# Patient Record
Sex: FEMALE | Race: WHITE | NOT HISPANIC OR LATINO | Employment: UNEMPLOYED | RURAL
[De-identification: names, ages, dates, MRNs, and addresses within clinical notes are randomized per-mention and may not be internally consistent; named-entity substitution may affect disease eponyms.]

---

## 2018-07-26 ENCOUNTER — HISTORICAL (OUTPATIENT)
Dept: ADMINISTRATIVE | Facility: HOSPITAL | Age: 38
End: 2018-07-26

## 2018-07-27 LAB
LAB AP CLINICAL INFORMATION: NORMAL
LAB AP DIAGNOSIS - HISTORICAL: NORMAL
LAB AP GROSS PATHOLOGY - HISTORICAL: NORMAL
LAB AP SPECIMEN SUBMITTED - HISTORICAL: NORMAL

## 2021-03-24 ENCOUNTER — OFFICE VISIT (OUTPATIENT)
Dept: FAMILY MEDICINE | Facility: CLINIC | Age: 41
End: 2021-03-24
Payer: COMMERCIAL

## 2021-03-24 VITALS
TEMPERATURE: 98 F | SYSTOLIC BLOOD PRESSURE: 120 MMHG | HEIGHT: 63 IN | DIASTOLIC BLOOD PRESSURE: 100 MMHG | HEART RATE: 101 BPM | WEIGHT: 128 LBS | RESPIRATION RATE: 18 BRPM | OXYGEN SATURATION: 96 % | BODY MASS INDEX: 22.68 KG/M2

## 2021-03-24 DIAGNOSIS — J20.9 ACUTE BRONCHITIS, UNSPECIFIED ORGANISM: Primary | ICD-10-CM

## 2021-03-24 PROCEDURE — 99203 OFFICE O/P NEW LOW 30 MIN: CPT | Mod: ,,, | Performed by: NURSE PRACTITIONER

## 2021-03-24 PROCEDURE — 99203 PR OFFICE/OUTPT VISIT, NEW, LEVL III, 30-44 MIN: ICD-10-PCS | Mod: ,,, | Performed by: NURSE PRACTITIONER

## 2021-03-24 RX ORDER — HYDROCODONE BITARTRATE AND ACETAMINOPHEN 5; 325 MG/1; MG/1
1 TABLET ORAL EVERY 6 HOURS PRN
COMMUNITY
End: 2021-05-24 | Stop reason: SDUPTHER

## 2021-03-24 RX ORDER — PREDNISONE 20 MG/1
40 TABLET ORAL DAILY
Qty: 10 TABLET | Refills: 0 | Status: SHIPPED | OUTPATIENT
Start: 2021-03-24 | End: 2021-03-29

## 2021-03-24 RX ORDER — BACLOFEN 10 MG/1
10 TABLET ORAL 3 TIMES DAILY
COMMUNITY
End: 2021-05-24 | Stop reason: SDUPTHER

## 2021-03-24 RX ORDER — BUPROPION HYDROCHLORIDE 100 MG/1
100 TABLET ORAL 2 TIMES DAILY
COMMUNITY
End: 2022-09-21

## 2021-03-24 RX ORDER — NORTRIPTYLINE HYDROCHLORIDE 10 MG/1
50 CAPSULE ORAL NIGHTLY
COMMUNITY
End: 2022-09-21 | Stop reason: SDUPTHER

## 2021-03-24 RX ORDER — AZITHROMYCIN 500 MG/1
500 TABLET, FILM COATED ORAL DAILY
Qty: 5 TABLET | Refills: 0 | Status: SHIPPED | OUTPATIENT
Start: 2021-03-24 | End: 2022-09-21

## 2021-03-24 RX ORDER — PROMETHAZINE HYDROCHLORIDE AND DEXTROMETHORPHAN HYDROBROMIDE 6.25; 15 MG/5ML; MG/5ML
5 SYRUP ORAL EVERY 6 HOURS PRN
Qty: 240 ML | Refills: 0 | Status: SHIPPED | OUTPATIENT
Start: 2021-03-24 | End: 2021-04-03

## 2021-05-24 ENCOUNTER — HOSPITAL ENCOUNTER (OUTPATIENT)
Dept: RADIOLOGY | Facility: HOSPITAL | Age: 41
Discharge: HOME | End: 2021-05-24
Attending: PHYSICIAN ASSISTANT
Payer: COMMERCIAL

## 2021-05-24 ENCOUNTER — OFFICE VISIT (OUTPATIENT)
Dept: PAIN MEDICINE | Facility: CLINIC | Age: 41
End: 2021-05-24
Payer: COMMERCIAL

## 2021-05-24 VITALS
WEIGHT: 133.19 LBS | SYSTOLIC BLOOD PRESSURE: 120 MMHG | HEIGHT: 63 IN | RESPIRATION RATE: 19 BRPM | BODY MASS INDEX: 23.6 KG/M2 | DIASTOLIC BLOOD PRESSURE: 83 MMHG | HEART RATE: 117 BPM

## 2021-05-24 DIAGNOSIS — G35 MULTIPLE SCLEROSIS: Chronic | ICD-10-CM

## 2021-05-24 DIAGNOSIS — M54.9 DORSALGIA, UNSPECIFIED: ICD-10-CM

## 2021-05-24 DIAGNOSIS — G43.709 CHRONIC MIGRAINE WITHOUT AURA WITHOUT STATUS MIGRAINOSUS, NOT INTRACTABLE: Chronic | ICD-10-CM

## 2021-05-24 DIAGNOSIS — M54.17 LUMBOSACRAL RADICULOPATHY: Primary | Chronic | ICD-10-CM

## 2021-05-24 DIAGNOSIS — M54.12 CERVICAL RADICULOPATHY: Chronic | ICD-10-CM

## 2021-05-24 PROCEDURE — 99214 OFFICE O/P EST MOD 30 MIN: CPT | Mod: PBBFAC,25 | Performed by: PHYSICIAN ASSISTANT

## 2021-05-24 PROCEDURE — 72110 XR LUMBAR SPINE 5 VIEW WITH FLEX AND EXT: ICD-10-PCS | Mod: 26,,, | Performed by: RADIOLOGY

## 2021-05-24 PROCEDURE — 72110 X-RAY EXAM L-2 SPINE 4/>VWS: CPT | Mod: TC

## 2021-05-24 PROCEDURE — 99214 OFFICE O/P EST MOD 30 MIN: CPT | Mod: S$PBB,,, | Performed by: PHYSICIAN ASSISTANT

## 2021-05-24 PROCEDURE — 72110 X-RAY EXAM L-2 SPINE 4/>VWS: CPT | Mod: 26,,, | Performed by: RADIOLOGY

## 2021-05-24 PROCEDURE — 99214 PR OFFICE/OUTPT VISIT, EST, LEVL IV, 30-39 MIN: ICD-10-PCS | Mod: S$PBB,,, | Performed by: PHYSICIAN ASSISTANT

## 2021-05-24 RX ORDER — AMITRIPTYLINE HYDROCHLORIDE 25 MG/1
25 TABLET, FILM COATED ORAL NIGHTLY
COMMUNITY
Start: 2021-05-10 | End: 2021-05-24 | Stop reason: SDUPTHER

## 2021-05-24 RX ORDER — LIDOCAINE 50 MG/G
1 PATCH TOPICAL DAILY
Qty: 30 PATCH | Refills: 0 | Status: SHIPPED | OUTPATIENT
Start: 2021-05-24 | End: 2021-06-23

## 2021-05-24 RX ORDER — TIZANIDINE 4 MG/1
4 TABLET ORAL EVERY 12 HOURS
Qty: 60 TABLET | Refills: 0 | Status: SHIPPED | OUTPATIENT
Start: 2021-05-24 | End: 2021-06-23

## 2021-05-24 RX ORDER — BACLOFEN 10 MG/1
10 TABLET ORAL 3 TIMES DAILY
Qty: 90 TABLET | Refills: 0 | Status: SHIPPED | OUTPATIENT
Start: 2021-05-24 | End: 2021-07-28 | Stop reason: SDUPTHER

## 2021-05-24 RX ORDER — AMITRIPTYLINE HYDROCHLORIDE 25 MG/1
25 TABLET, FILM COATED ORAL NIGHTLY
Qty: 30 TABLET | Refills: 0 | Status: SHIPPED | OUTPATIENT
Start: 2021-05-24 | End: 2021-09-29 | Stop reason: SDUPTHER

## 2021-05-24 RX ORDER — HYDROCODONE BITARTRATE AND ACETAMINOPHEN 5; 325 MG/1; MG/1
1 TABLET ORAL EVERY 8 HOURS
Qty: 90 TABLET | Refills: 0 | Status: SHIPPED | OUTPATIENT
Start: 2021-06-09 | End: 2021-06-10 | Stop reason: CLARIF

## 2021-05-24 RX ORDER — GABAPENTIN 300 MG/1
300 CAPSULE ORAL EVERY 8 HOURS
Qty: 90 CAPSULE | Refills: 0 | Status: SHIPPED | OUTPATIENT
Start: 2021-05-24 | End: 2021-07-26 | Stop reason: SDUPTHER

## 2021-05-24 RX ORDER — TIZANIDINE 4 MG/1
4 TABLET ORAL EVERY 12 HOURS PRN
COMMUNITY
Start: 2021-05-10 | End: 2021-05-24 | Stop reason: SDUPTHER

## 2021-05-24 RX ORDER — GABAPENTIN 300 MG/1
600 CAPSULE ORAL EVERY 8 HOURS
COMMUNITY
Start: 2021-05-10 | End: 2021-05-24 | Stop reason: SDUPTHER

## 2021-06-10 ENCOUNTER — TELEPHONE (OUTPATIENT)
Dept: PAIN MEDICINE | Facility: CLINIC | Age: 41
End: 2021-06-10

## 2021-06-10 RX ORDER — HYDROCODONE BITARTRATE AND ACETAMINOPHEN 10; 325 MG/1; MG/1
1 TABLET ORAL EVERY 8 HOURS
Qty: 90 TABLET | Refills: 0 | Status: SHIPPED | OUTPATIENT
Start: 2021-06-10 | End: 2021-07-26 | Stop reason: SDUPTHER

## 2021-07-28 ENCOUNTER — OFFICE VISIT (OUTPATIENT)
Dept: PAIN MEDICINE | Facility: CLINIC | Age: 41
End: 2021-07-28
Payer: COMMERCIAL

## 2021-07-28 VITALS
BODY MASS INDEX: 23.78 KG/M2 | SYSTOLIC BLOOD PRESSURE: 117 MMHG | RESPIRATION RATE: 17 BRPM | HEIGHT: 63 IN | HEART RATE: 102 BPM | DIASTOLIC BLOOD PRESSURE: 84 MMHG | WEIGHT: 134.19 LBS

## 2021-07-28 DIAGNOSIS — Z79.899 ENCOUNTER FOR LONG-TERM (CURRENT) USE OF OTHER MEDICATIONS: ICD-10-CM

## 2021-07-28 DIAGNOSIS — G35 MULTIPLE SCLEROSIS: Chronic | ICD-10-CM

## 2021-07-28 DIAGNOSIS — M54.17 LUMBOSACRAL RADICULOPATHY: Chronic | ICD-10-CM

## 2021-07-28 DIAGNOSIS — M54.12 CERVICAL RADICULOPATHY: Primary | Chronic | ICD-10-CM

## 2021-07-28 LAB
CTP QC/QA: YES
POC (AMP) AMPHETAMINE: NEGATIVE
POC (BAR) BARBITURATES: NEGATIVE
POC (BUP) BUPRENORPHINE: NEGATIVE
POC (BZO) BENZODIAZEPINES: NEGATIVE
POC (COC) COCAINE: NEGATIVE
POC (MDMA) METHYLENEDIOXYMETHAMPHETAMINE 3,4: NEGATIVE
POC (MET) METHAMPHETAMINE: NEGATIVE
POC (MOP) OPIATES: ABNORMAL
POC (MTD) METHADONE: NEGATIVE
POC (OXY) OXYCODONE: NEGATIVE
POC (PCP) PHENCYCLIDINE: NEGATIVE
POC (TCA) TRICYCLIC ANTIDEPRESSANTS: NEGATIVE
POC TEMPERATURE (URINE): 92
POC THC: NEGATIVE

## 2021-07-28 PROCEDURE — 99214 OFFICE O/P EST MOD 30 MIN: CPT | Mod: S$PBB,,, | Performed by: PHYSICIAN ASSISTANT

## 2021-07-28 PROCEDURE — 80305 DRUG TEST PRSMV DIR OPT OBS: CPT | Mod: PBBFAC | Performed by: PHYSICIAN ASSISTANT

## 2021-07-28 PROCEDURE — 99214 PR OFFICE/OUTPT VISIT, EST, LEVL IV, 30-39 MIN: ICD-10-PCS | Mod: S$PBB,,, | Performed by: PHYSICIAN ASSISTANT

## 2021-07-28 PROCEDURE — 99214 OFFICE O/P EST MOD 30 MIN: CPT | Mod: PBBFAC | Performed by: PHYSICIAN ASSISTANT

## 2021-07-28 RX ORDER — HYDROCODONE BITARTRATE AND ACETAMINOPHEN 10; 325 MG/1; MG/1
1 TABLET ORAL EVERY 8 HOURS PRN
COMMUNITY
End: 2021-09-29 | Stop reason: SDUPTHER

## 2021-07-28 RX ORDER — LIDOCAINE 50 MG/G
1 PATCH TOPICAL
COMMUNITY
End: 2021-10-01 | Stop reason: SDUPTHER

## 2021-07-28 RX ORDER — BACLOFEN 10 MG/1
10 TABLET ORAL 3 TIMES DAILY
Qty: 90 TABLET | Refills: 0 | Status: SHIPPED | OUTPATIENT
Start: 2021-07-28 | End: 2021-09-29 | Stop reason: SDUPTHER

## 2021-07-28 RX ORDER — HYDROCODONE BITARTRATE AND ACETAMINOPHEN 10; 325 MG/1; MG/1
1 TABLET ORAL EVERY 8 HOURS
Qty: 90 TABLET | Refills: 0 | Status: SHIPPED | OUTPATIENT
Start: 2021-07-28 | End: 2021-08-27

## 2021-07-28 RX ORDER — TIZANIDINE HYDROCHLORIDE 4 MG/1
1 CAPSULE, GELATIN COATED ORAL EVERY 12 HOURS
Qty: 60 CAPSULE | Refills: 0 | Status: SHIPPED | OUTPATIENT
Start: 2021-07-28 | End: 2021-10-01 | Stop reason: SDUPTHER

## 2021-07-28 RX ORDER — GABAPENTIN 300 MG/1
300 CAPSULE ORAL EVERY 8 HOURS
Qty: 90 CAPSULE | Refills: 0 | Status: SHIPPED | OUTPATIENT
Start: 2021-07-28 | End: 2021-09-29 | Stop reason: SDUPTHER

## 2021-08-04 RX ORDER — LIDOCAINE 50 MG/G
1 PATCH TOPICAL DAILY
Qty: 30 PATCH | Refills: 0 | Status: SHIPPED | OUTPATIENT
Start: 2021-08-04 | End: 2021-09-03

## 2021-08-04 RX ORDER — LIDOCAINE 50 MG/G
1 PATCH TOPICAL
OUTPATIENT
Start: 2021-08-04

## 2021-08-17 PROBLEM — M79.10 MYALGIA: Chronic | Status: ACTIVE | Noted: 2021-08-17

## 2021-08-17 PROBLEM — M54.2 NECK PAIN: Chronic | Status: ACTIVE | Noted: 2021-08-17

## 2021-10-01 ENCOUNTER — OFFICE VISIT (OUTPATIENT)
Dept: PAIN MEDICINE | Facility: CLINIC | Age: 41
End: 2021-10-01
Payer: COMMERCIAL

## 2021-10-01 VITALS
WEIGHT: 132 LBS | BODY MASS INDEX: 23.39 KG/M2 | HEIGHT: 63 IN | DIASTOLIC BLOOD PRESSURE: 81 MMHG | HEART RATE: 104 BPM | RESPIRATION RATE: 20 BRPM | SYSTOLIC BLOOD PRESSURE: 106 MMHG

## 2021-10-01 DIAGNOSIS — M54.12 CERVICAL RADICULOPATHY: Chronic | ICD-10-CM

## 2021-10-01 DIAGNOSIS — G35 MULTIPLE SCLEROSIS: Primary | Chronic | ICD-10-CM

## 2021-10-01 DIAGNOSIS — Z79.899 ENCOUNTER FOR LONG-TERM (CURRENT) USE OF OTHER MEDICATIONS: ICD-10-CM

## 2021-10-01 DIAGNOSIS — M54.2 NECK PAIN: ICD-10-CM

## 2021-10-01 DIAGNOSIS — M79.10 MYALGIA: ICD-10-CM

## 2021-10-01 DIAGNOSIS — M54.17 LUMBOSACRAL RADICULOPATHY: Chronic | ICD-10-CM

## 2021-10-01 LAB
CTP QC/QA: YES
POC (AMP) AMPHETAMINE: NEGATIVE
POC (BAR) BARBITURATES: NEGATIVE
POC (BUP) BUPRENORPHINE: NEGATIVE
POC (BZO) BENZODIAZEPINES: NEGATIVE
POC (COC) COCAINE: NEGATIVE
POC (MDMA) METHYLENEDIOXYMETHAMPHETAMINE 3,4: NEGATIVE
POC (MET) METHAMPHETAMINE: NEGATIVE
POC (MOP) OPIATES: ABNORMAL
POC (MTD) METHADONE: NEGATIVE
POC (OXY) OXYCODONE: NEGATIVE
POC (PCP) PHENCYCLIDINE: NEGATIVE
POC (TCA) TRICYCLIC ANTIDEPRESSANTS: NEGATIVE
POC TEMPERATURE (URINE): 90
POC THC: NEGATIVE

## 2021-10-01 PROCEDURE — 99214 PR OFFICE/OUTPT VISIT, EST, LEVL IV, 30-39 MIN: ICD-10-PCS | Mod: S$PBB,,, | Performed by: PHYSICIAN ASSISTANT

## 2021-10-01 PROCEDURE — 99213 OFFICE O/P EST LOW 20 MIN: CPT | Mod: PBBFAC | Performed by: PHYSICIAN ASSISTANT

## 2021-10-01 PROCEDURE — 80305 DRUG TEST PRSMV DIR OPT OBS: CPT | Mod: PBBFAC | Performed by: PHYSICIAN ASSISTANT

## 2021-10-01 PROCEDURE — 99214 OFFICE O/P EST MOD 30 MIN: CPT | Mod: S$PBB,,, | Performed by: PHYSICIAN ASSISTANT

## 2021-10-01 RX ORDER — TIZANIDINE HYDROCHLORIDE 4 MG/1
1 CAPSULE, GELATIN COATED ORAL EVERY 12 HOURS
Qty: 60 CAPSULE | Refills: 0 | Status: SHIPPED | OUTPATIENT
Start: 2021-10-01 | End: 2021-10-15 | Stop reason: SDUPTHER

## 2021-10-01 RX ORDER — HYDROCODONE BITARTRATE AND ACETAMINOPHEN 10; 325 MG/1; MG/1
1 TABLET ORAL EVERY 8 HOURS
Qty: 90 TABLET | Refills: 0 | Status: SHIPPED | OUTPATIENT
Start: 2021-10-01 | End: 2021-10-15 | Stop reason: SDUPTHER

## 2021-10-01 RX ORDER — BACLOFEN 10 MG/1
10 TABLET ORAL 3 TIMES DAILY
Qty: 90 TABLET | Refills: 0 | Status: SHIPPED | OUTPATIENT
Start: 2021-10-01 | End: 2021-10-15 | Stop reason: SDUPTHER

## 2021-10-01 RX ORDER — LIDOCAINE 50 MG/G
1 PATCH TOPICAL DAILY
Qty: 30 PATCH | Refills: 0 | Status: SHIPPED | OUTPATIENT
Start: 2021-10-01 | End: 2021-10-15 | Stop reason: SDUPTHER

## 2021-10-01 RX ORDER — AMITRIPTYLINE HYDROCHLORIDE 25 MG/1
25 TABLET, FILM COATED ORAL NIGHTLY
Qty: 30 TABLET | Refills: 0 | Status: SHIPPED | OUTPATIENT
Start: 2021-10-01 | End: 2021-10-15 | Stop reason: SDUPTHER

## 2021-10-01 RX ORDER — GABAPENTIN 300 MG/1
300 CAPSULE ORAL EVERY 8 HOURS
Qty: 90 CAPSULE | Refills: 0 | Status: SHIPPED | OUTPATIENT
Start: 2021-10-01 | End: 2021-10-15 | Stop reason: SDUPTHER

## 2021-10-15 ENCOUNTER — PROCEDURE VISIT (OUTPATIENT)
Dept: PAIN MEDICINE | Facility: CLINIC | Age: 41
End: 2021-10-15
Payer: COMMERCIAL

## 2021-10-15 VITALS
SYSTOLIC BLOOD PRESSURE: 112 MMHG | RESPIRATION RATE: 18 BRPM | BODY MASS INDEX: 23.6 KG/M2 | HEIGHT: 63 IN | DIASTOLIC BLOOD PRESSURE: 83 MMHG | HEART RATE: 79 BPM | WEIGHT: 133.19 LBS

## 2021-10-15 DIAGNOSIS — M79.10 MYALGIA: ICD-10-CM

## 2021-10-15 DIAGNOSIS — M54.17 LUMBOSACRAL RADICULOPATHY: Chronic | ICD-10-CM

## 2021-10-15 DIAGNOSIS — M54.12 CERVICAL RADICULOPATHY: Chronic | ICD-10-CM

## 2021-10-15 DIAGNOSIS — G35 MULTIPLE SCLEROSIS: Primary | Chronic | ICD-10-CM

## 2021-10-15 DIAGNOSIS — M54.2 NECK PAIN: ICD-10-CM

## 2021-10-15 PROCEDURE — 20552 NJX 1/MLT TRIGGER POINT 1/2: CPT | Mod: PBBFAC | Performed by: PHYSICIAN ASSISTANT

## 2021-10-15 PROCEDURE — 20552 TRIGGER POINT INJECTION: ICD-10-PCS | Mod: S$PBB,,, | Performed by: PHYSICIAN ASSISTANT

## 2021-10-15 RX ORDER — LIDOCAINE 50 MG/G
1 PATCH TOPICAL DAILY
Qty: 30 PATCH | Refills: 2 | Status: SHIPPED | OUTPATIENT
Start: 2021-10-15 | End: 2022-03-02 | Stop reason: SDUPTHER

## 2021-10-15 RX ORDER — TIZANIDINE HYDROCHLORIDE 4 MG/1
1 CAPSULE, GELATIN COATED ORAL EVERY 12 HOURS
Qty: 60 CAPSULE | Refills: 2 | Status: SHIPPED | OUTPATIENT
Start: 2021-10-15 | End: 2022-03-02 | Stop reason: SDUPTHER

## 2021-10-15 RX ORDER — GABAPENTIN 300 MG/1
300 CAPSULE ORAL EVERY 8 HOURS
Qty: 90 CAPSULE | Refills: 2 | Status: SHIPPED | OUTPATIENT
Start: 2021-10-15 | End: 2022-04-05 | Stop reason: SDUPTHER

## 2021-10-15 RX ORDER — HYDROCODONE BITARTRATE AND ACETAMINOPHEN 10; 325 MG/1; MG/1
1 TABLET ORAL EVERY 8 HOURS
Qty: 90 TABLET | Refills: 0 | Status: SHIPPED | OUTPATIENT
Start: 2021-11-14 | End: 2021-12-14

## 2021-10-15 RX ORDER — HYDROCODONE BITARTRATE AND ACETAMINOPHEN 10; 325 MG/1; MG/1
1 TABLET ORAL EVERY 8 HOURS
Qty: 90 TABLET | Refills: 0 | Status: SHIPPED | OUTPATIENT
Start: 2021-12-14 | End: 2022-01-13

## 2021-10-15 RX ORDER — BACLOFEN 10 MG/1
10 TABLET ORAL 3 TIMES DAILY
Qty: 90 TABLET | Refills: 2 | Status: SHIPPED | OUTPATIENT
Start: 2021-10-15 | End: 2022-03-02 | Stop reason: SDUPTHER

## 2021-10-15 RX ORDER — AMITRIPTYLINE HYDROCHLORIDE 25 MG/1
25 TABLET, FILM COATED ORAL NIGHTLY
Qty: 30 TABLET | Refills: 2 | Status: SHIPPED | OUTPATIENT
Start: 2021-10-15 | End: 2022-03-02 | Stop reason: SDUPTHER
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
## 2022-03-02 ENCOUNTER — OFFICE VISIT (OUTPATIENT)
Dept: PAIN MEDICINE | Facility: CLINIC | Age: 42
End: 2022-03-02
Payer: COMMERCIAL

## 2022-03-02 VITALS
BODY MASS INDEX: 24.45 KG/M2 | RESPIRATION RATE: 18 BRPM | HEART RATE: 117 BPM | HEIGHT: 63 IN | DIASTOLIC BLOOD PRESSURE: 88 MMHG | WEIGHT: 138 LBS | SYSTOLIC BLOOD PRESSURE: 124 MMHG

## 2022-03-02 DIAGNOSIS — G35 MULTIPLE SCLEROSIS: Chronic | ICD-10-CM

## 2022-03-02 DIAGNOSIS — M54.12 CERVICAL RADICULOPATHY: Primary | Chronic | ICD-10-CM

## 2022-03-02 DIAGNOSIS — M54.17 LUMBOSACRAL RADICULOPATHY: Chronic | ICD-10-CM

## 2022-03-02 DIAGNOSIS — Z79.899 ENCOUNTER FOR LONG-TERM (CURRENT) USE OF OTHER MEDICATIONS: ICD-10-CM

## 2022-03-02 DIAGNOSIS — M54.2 NECK PAIN: Chronic | ICD-10-CM

## 2022-03-02 PROCEDURE — 99214 PR OFFICE/OUTPT VISIT, EST, LEVL IV, 30-39 MIN: ICD-10-PCS | Mod: S$PBB,,, | Performed by: PAIN MEDICINE

## 2022-03-02 PROCEDURE — 99214 OFFICE O/P EST MOD 30 MIN: CPT | Mod: S$PBB,,, | Performed by: PAIN MEDICINE

## 2022-03-02 PROCEDURE — 3079F PR MOST RECENT DIASTOLIC BLOOD PRESSURE 80-89 MM HG: ICD-10-PCS | Mod: CPTII,,, | Performed by: PAIN MEDICINE

## 2022-03-02 PROCEDURE — 80305 DRUG TEST PRSMV DIR OPT OBS: CPT | Mod: PBBFAC | Performed by: PAIN MEDICINE

## 2022-03-02 PROCEDURE — 3079F DIAST BP 80-89 MM HG: CPT | Mod: CPTII,,, | Performed by: PAIN MEDICINE

## 2022-03-02 PROCEDURE — 3008F BODY MASS INDEX DOCD: CPT | Mod: CPTII,,, | Performed by: PAIN MEDICINE

## 2022-03-02 PROCEDURE — 3074F SYST BP LT 130 MM HG: CPT | Mod: CPTII,,, | Performed by: PAIN MEDICINE

## 2022-03-02 PROCEDURE — 1159F MED LIST DOCD IN RCRD: CPT | Mod: CPTII,,, | Performed by: PAIN MEDICINE

## 2022-03-02 PROCEDURE — 3008F PR BODY MASS INDEX (BMI) DOCUMENTED: ICD-10-PCS | Mod: CPTII,,, | Performed by: PAIN MEDICINE

## 2022-03-02 PROCEDURE — 1159F PR MEDICATION LIST DOCUMENTED IN MEDICAL RECORD: ICD-10-PCS | Mod: CPTII,,, | Performed by: PAIN MEDICINE

## 2022-03-02 PROCEDURE — 99214 OFFICE O/P EST MOD 30 MIN: CPT | Mod: PBBFAC | Performed by: PAIN MEDICINE

## 2022-03-02 PROCEDURE — 3074F PR MOST RECENT SYSTOLIC BLOOD PRESSURE < 130 MM HG: ICD-10-PCS | Mod: CPTII,,, | Performed by: PAIN MEDICINE

## 2022-03-02 RX ORDER — ESCITALOPRAM OXALATE 20 MG/1
20 TABLET ORAL DAILY
COMMUNITY

## 2022-03-02 RX ORDER — AMITRIPTYLINE HYDROCHLORIDE 25 MG/1
25 TABLET, FILM COATED ORAL NIGHTLY
Qty: 30 TABLET | Refills: 2 | Status: SHIPPED | OUTPATIENT
Start: 2022-03-02 | End: 2022-04-05 | Stop reason: SDUPTHER

## 2022-03-02 RX ORDER — LIDOCAINE 50 MG/G
1 PATCH TOPICAL DAILY
Qty: 30 PATCH | Refills: 2 | Status: SHIPPED | OUTPATIENT
Start: 2022-03-02 | End: 2022-04-05 | Stop reason: SDUPTHER

## 2022-03-02 RX ORDER — TIZANIDINE HYDROCHLORIDE 4 MG/1
1 CAPSULE, GELATIN COATED ORAL EVERY 12 HOURS
Qty: 60 CAPSULE | Refills: 2 | Status: SHIPPED | OUTPATIENT
Start: 2022-03-02 | End: 2022-04-05 | Stop reason: SDUPTHER

## 2022-03-02 RX ORDER — BACLOFEN 10 MG/1
10 TABLET ORAL 3 TIMES DAILY
Qty: 90 TABLET | Refills: 2 | Status: SHIPPED | OUTPATIENT
Start: 2022-03-02 | End: 2022-04-05 | Stop reason: SDUPTHER

## 2022-03-02 RX ORDER — HYDROCODONE BITARTRATE AND ACETAMINOPHEN 10; 325 MG/1; MG/1
1 TABLET ORAL EVERY 8 HOURS PRN
Qty: 90 TABLET | Refills: 0 | Status: SHIPPED | OUTPATIENT
Start: 2022-03-02 | End: 2022-04-05 | Stop reason: SDUPTHER

## 2022-03-10 ENCOUNTER — CLINICAL SUPPORT (OUTPATIENT)
Dept: REHABILITATION | Facility: HOSPITAL | Age: 42
End: 2022-03-10
Attending: PAIN MEDICINE
Payer: COMMERCIAL

## 2022-03-10 DIAGNOSIS — R20.0 NUMBNESS AND TINGLING: ICD-10-CM

## 2022-03-10 DIAGNOSIS — M54.17 LUMBOSACRAL RADICULOPATHY: Chronic | ICD-10-CM

## 2022-03-10 DIAGNOSIS — M25.60 DECREASED RANGE OF MOTION: ICD-10-CM

## 2022-03-10 DIAGNOSIS — M54.12 CERVICAL RADICULOPATHY: Primary | Chronic | ICD-10-CM

## 2022-03-10 DIAGNOSIS — R20.2 NUMBNESS AND TINGLING: ICD-10-CM

## 2022-03-10 DIAGNOSIS — R53.1 DECREASED STRENGTH: ICD-10-CM

## 2022-03-10 DIAGNOSIS — R52 PAIN: ICD-10-CM

## 2022-03-10 PROCEDURE — 97161 PT EVAL LOW COMPLEX 20 MIN: CPT
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
## 2022-03-11 PROBLEM — R20.0 NUMBNESS AND TINGLING: Status: ACTIVE | Noted: 2022-03-11

## 2022-03-11 PROBLEM — R20.2 NUMBNESS AND TINGLING: Status: ACTIVE | Noted: 2022-03-11

## 2022-03-11 PROBLEM — R53.1 DECREASED STRENGTH: Status: ACTIVE | Noted: 2022-03-11

## 2022-03-11 PROBLEM — R52 PAIN: Status: ACTIVE | Noted: 2022-03-11

## 2022-03-11 PROBLEM — M25.60 DECREASED RANGE OF MOTION: Status: ACTIVE | Noted: 2022-03-11

## 2022-03-30 ENCOUNTER — CLINICAL SUPPORT (OUTPATIENT)
Dept: REHABILITATION | Facility: HOSPITAL | Age: 42
End: 2022-03-30
Attending: PAIN MEDICINE
Payer: COMMERCIAL

## 2022-03-30 DIAGNOSIS — R52 PAIN: ICD-10-CM

## 2022-03-30 DIAGNOSIS — M25.60 DECREASED RANGE OF MOTION: Primary | ICD-10-CM

## 2022-03-30 DIAGNOSIS — R53.1 DECREASED STRENGTH: ICD-10-CM

## 2022-03-30 DIAGNOSIS — R20.2 NUMBNESS AND TINGLING: ICD-10-CM

## 2022-03-30 DIAGNOSIS — R20.0 NUMBNESS AND TINGLING: ICD-10-CM

## 2022-03-30 PROCEDURE — 97140 MANUAL THERAPY 1/> REGIONS: CPT

## 2022-03-30 PROCEDURE — 97014 ELECTRIC STIMULATION THERAPY: CPT

## 2022-03-30 PROCEDURE — 97110 THERAPEUTIC EXERCISES: CPT

## 2022-03-30 NOTE — PROGRESS NOTES
Physical Therapy Treatment Note     Name: Pili Love  Clinic Number: [id number]    Therapy Diagnosis:   Encounter Diagnoses   Name Primary?    Decreased range of motion Yes    Decreased strength     Pain     Numbness and tingling      Physician: Twyla Gaston MD    Visit Date: 3/30/2022    Physician Orders: PT Eval and Treat   Medical Diagnosis from Referral: Cervical/lumbar radiculopathy  Evaluation Date: 3/10/2022  Authorization Period Expiration: 3/2/23  Plan of Care Expiration: 4/12/22  Visit # / Visits authorized: 2/ 13      Time In: 1010am  Time Out: 1115am  Total Billable Time: 65 minutes    Precautions: Standard, Multiple Sclerosis      Subjective     Pt reports: The right upper trapezius is hurting. Can hardly lift the right arm. I hurt so bad. The low back hurts.  Everything is the same from the eval and probably worse.     She was compliant with home exercise program.    Pain: 8/10      Objective     PILI received therapeutic exercises to develop strength, endurance, ROM, flexibility, posture and core stabilization for 12 minutes including:  UBE x 3 min  Pulleys x 10 with 10 sec hold  Supine cane flexion x 10      PILI received the following manual therapy techniques: Myofacial release and Soft tissue Mobilization were applied to the: bilateral scalenes, SCM, upper trapezius  for 35 minutes, including:    Myofascial release and passive stretching in supine and left sidelying to address right side.     Dry Needling Assessment:  Patient demonstrates appropriate response to Functional Dry Needling.  Objective:  See EMR under MEDIA for written consent provided 3/30/2022.   Palpation Assessment to determine the necessity for Functional Dry Needling: tender trigger point and muscle guarding.   PILI received the following manual therapy techniques: myofascial release to bilateral cervical musculature   Plan:   Monitor response to Functional Dry Needling. Continue with Functional Dry  Needling in plan of care as appropriate     PROM x 8 min to right shoulder      PILI received the following supervised modalities after being cleared for contradictions: IFC Electrical Stimulation:  Pili received IFC Electrical Stimulation for pain control applied to the right shoulder x 12 minutes. Pili tolderated treatment well without any adverse effects.      PILI received hot pack for 12 minutes to right shoulder.    Home Exercises Provided and Patient Education Provided     Education provided: dry needling outcomes      Assessment     Patient is very guarded with all movements and expresses a pained face during all active/passive right shoulder mobility. Completed gentle stretching exercises, myofascial release to the cervical musculature, dry needling with 2 x 50mm into the trapezius and IFC with heat to the right upper trapezius. Patient requires verbal cues to relax and allow for the movement to occur and not to guard. This is difficult for the patient since she has such a high pain level.  Did not address the low back pain in today's session.       PILI Is progressing well towards her goals.   Pt prognosis is Fair.     Pt will continue to benefit from skilled outpatient physical therapy to address the deficits listed in the problem list box on initial evaluation, provide pt/family education and to maximize pt's level of independence in the home and community environment.     Pt's spiritual, cultural and educational needs considered and pt agreeable to plan of care and goals.     Anticipated barriers to physical therapy: multiple deficits    Goals:    Short Term Goals: 4 weeks   1. Patient will be able to complete bed mobility with no difficulty  2. Patient will have manual muscle test of 3+/5 for upper and lower extremities   3. Patient will be able to maintain ~ 5 sec balance each leg without loss of balance   4. Patient will report no numbness in legs/arms     Long Term Goals: 8 weeks    1. Patient will be independent with activities of daily living   2. Patient will have manual muscle test of 4-/5 for upper/lower extremities   3. Patient will be able to sleep through the night without waking from pain  4. Patient will complete activities of daily living with 4/10 pain       Plan     Continue to progress toward goals and add to home exercise program     LANE ZURITA, PT  3/30/2022

## 2022-04-05 ENCOUNTER — OFFICE VISIT (OUTPATIENT)
Dept: PAIN MEDICINE | Facility: CLINIC | Age: 42
End: 2022-04-05
Payer: COMMERCIAL

## 2022-04-05 VITALS
SYSTOLIC BLOOD PRESSURE: 142 MMHG | WEIGHT: 138 LBS | RESPIRATION RATE: 19 BRPM | BODY MASS INDEX: 24.45 KG/M2 | HEIGHT: 63 IN | HEART RATE: 96 BPM | DIASTOLIC BLOOD PRESSURE: 101 MMHG

## 2022-04-05 DIAGNOSIS — G35 MULTIPLE SCLEROSIS: Primary | Chronic | ICD-10-CM

## 2022-04-05 DIAGNOSIS — M54.12 CERVICAL RADICULOPATHY: Chronic | ICD-10-CM

## 2022-04-05 DIAGNOSIS — M54.17 LUMBOSACRAL RADICULOPATHY: Chronic | ICD-10-CM

## 2022-04-05 PROCEDURE — 99214 OFFICE O/P EST MOD 30 MIN: CPT | Mod: S$PBB,25,, | Performed by: PHYSICIAN ASSISTANT

## 2022-04-05 PROCEDURE — 99214 PR OFFICE/OUTPT VISIT, EST, LEVL IV, 30-39 MIN: ICD-10-PCS | Mod: S$PBB,25,, | Performed by: PHYSICIAN ASSISTANT

## 2022-04-05 PROCEDURE — 3080F DIAST BP >= 90 MM HG: CPT | Mod: CPTII,,, | Performed by: PHYSICIAN ASSISTANT

## 2022-04-05 PROCEDURE — 96372 THER/PROPH/DIAG INJ SC/IM: CPT | Mod: PBBFAC | Performed by: PHYSICIAN ASSISTANT

## 2022-04-05 PROCEDURE — 3008F BODY MASS INDEX DOCD: CPT | Mod: CPTII,,, | Performed by: PHYSICIAN ASSISTANT

## 2022-04-05 PROCEDURE — 3080F PR MOST RECENT DIASTOLIC BLOOD PRESSURE >= 90 MM HG: ICD-10-PCS | Mod: CPTII,,, | Performed by: PHYSICIAN ASSISTANT

## 2022-04-05 PROCEDURE — 99213 OFFICE O/P EST LOW 20 MIN: CPT | Mod: PBBFAC,25 | Performed by: PHYSICIAN ASSISTANT

## 2022-04-05 PROCEDURE — 3008F PR BODY MASS INDEX (BMI) DOCUMENTED: ICD-10-PCS | Mod: CPTII,,, | Performed by: PHYSICIAN ASSISTANT

## 2022-04-05 PROCEDURE — 3077F SYST BP >= 140 MM HG: CPT | Mod: CPTII,,, | Performed by: PHYSICIAN ASSISTANT

## 2022-04-05 PROCEDURE — 3077F PR MOST RECENT SYSTOLIC BLOOD PRESSURE >= 140 MM HG: ICD-10-PCS | Mod: CPTII,,, | Performed by: PHYSICIAN ASSISTANT

## 2022-04-05 RX ORDER — GABAPENTIN 300 MG/1
300 CAPSULE ORAL EVERY 8 HOURS
Qty: 90 CAPSULE | Refills: 2 | Status: SHIPPED | OUTPATIENT
Start: 2022-04-05 | End: 2022-07-27 | Stop reason: SDUPTHER

## 2022-04-05 RX ORDER — HYDROCODONE BITARTRATE AND ACETAMINOPHEN 10; 325 MG/1; MG/1
1 TABLET ORAL EVERY 8 HOURS
Qty: 90 TABLET | Refills: 0 | Status: SHIPPED | OUTPATIENT
Start: 2022-05-30 | End: 2022-06-29

## 2022-04-05 RX ORDER — TIZANIDINE HYDROCHLORIDE 4 MG/1
1 CAPSULE, GELATIN COATED ORAL EVERY 12 HOURS
Qty: 60 CAPSULE | Refills: 2 | Status: SHIPPED | OUTPATIENT
Start: 2022-04-05 | End: 2022-07-27 | Stop reason: SDUPTHER

## 2022-04-05 RX ORDER — BACLOFEN 10 MG/1
10 TABLET ORAL 3 TIMES DAILY
Qty: 90 TABLET | Refills: 2 | Status: SHIPPED | OUTPATIENT
Start: 2022-04-05 | End: 2022-07-27 | Stop reason: SDUPTHER

## 2022-04-05 RX ORDER — HYDROCODONE BITARTRATE AND ACETAMINOPHEN 10; 325 MG/1; MG/1
1 TABLET ORAL EVERY 8 HOURS
Qty: 90 TABLET | Refills: 0 | Status: SHIPPED | OUTPATIENT
Start: 2022-04-30 | End: 2022-05-30

## 2022-04-05 RX ORDER — HYDROCODONE BITARTRATE AND ACETAMINOPHEN 10; 325 MG/1; MG/1
1 TABLET ORAL EVERY 8 HOURS
Qty: 90 TABLET | Refills: 0 | Status: SHIPPED | OUTPATIENT
Start: 2022-06-29 | End: 2022-07-27 | Stop reason: SDUPTHER

## 2022-04-05 RX ORDER — KETOROLAC TROMETHAMINE 30 MG/ML
60 INJECTION, SOLUTION INTRAMUSCULAR; INTRAVENOUS
Status: COMPLETED | OUTPATIENT
Start: 2022-04-05 | End: 2022-04-05

## 2022-04-05 RX ORDER — LIDOCAINE 50 MG/G
1 PATCH TOPICAL DAILY
Qty: 30 PATCH | Refills: 2 | Status: SHIPPED | OUTPATIENT
Start: 2022-04-05 | End: 2022-07-27 | Stop reason: SDUPTHER

## 2022-04-05 RX ORDER — AMITRIPTYLINE HYDROCHLORIDE 25 MG/1
25 TABLET, FILM COATED ORAL NIGHTLY
Qty: 30 TABLET | Refills: 2 | Status: SHIPPED | OUTPATIENT
Start: 2022-04-05 | End: 2022-07-27 | Stop reason: SDUPTHER

## 2022-04-05 RX ORDER — ORPHENADRINE CITRATE 30 MG/ML
60 INJECTION INTRAMUSCULAR; INTRAVENOUS
Status: COMPLETED | OUTPATIENT
Start: 2022-04-05 | End: 2022-04-05

## 2022-04-05 RX ADMIN — ORPHENADRINE CITRATE 60 MG: 30 INJECTION INTRAMUSCULAR; INTRAVENOUS at 02:04

## 2022-04-05 RX ADMIN — KETOROLAC TROMETHAMINE 60 MG: 30 INJECTION, SOLUTION INTRAMUSCULAR at 02:04

## 2022-04-05 NOTE — PROGRESS NOTES
Disclaimer:  This note has been generated using voice recognition software.  There may be type of graft focal areas that have been missed during a proof reading      Subjective:      Patient ID: Pili Love is a 41 y.o. female.    Chief Complaint: Low-back Pain, Neck Pain, and Shoulder Pain      Pain  This is a chronic problem. The current episode started more than 1 year ago. The problem occurs daily. The problem has been unchanged. Associated symptoms include arthralgias, myalgias and neck pain. Pertinent negatives include no change in bowel habit, chest pain, chills, coughing, fatigue, fever, rash, sore throat, vertigo or vomiting.     Review of Systems   Constitutional: Negative for activity change, appetite change, chills, fatigue and fever.   HENT: Negative for drooling, ear discharge, ear pain, facial swelling, nosebleeds, sore throat, trouble swallowing, voice change and goiter.    Eyes: Negative for photophobia, pain, discharge, redness and visual disturbance.   Respiratory: Negative for apnea, cough, choking, chest tightness, shortness of breath, wheezing and stridor.    Cardiovascular: Negative for chest pain, palpitations and leg swelling.   Gastrointestinal: Negative for abdominal distention, change in bowel habit, diarrhea, rectal pain, vomiting, fecal incontinence and change in bowel habit.   Endocrine: Negative for cold intolerance, heat intolerance, polydipsia, polyphagia and polyuria.   Genitourinary: Negative for bladder incontinence, dysuria, flank pain, frequency and hot flashes.   Musculoskeletal: Positive for arthralgias, back pain, leg pain, myalgias and neck pain.   Integumentary:  Negative for color change, pallor and rash.   Allergic/Immunologic: Negative for immunocompromised state.   Neurological: Negative for dizziness, vertigo, seizures, syncope, facial asymmetry, speech difficulty, light-headedness, disturbances in coordination, memory loss and coordination difficulties.  "  Hematological: Negative for adenopathy. Does not bruise/bleed easily.   Psychiatric/Behavioral: Negative for agitation, behavioral problems, confusion, decreased concentration, dysphoric mood, hallucinations, self-injury and suicidal ideas. The patient is not nervous/anxious and is not hyperactive.             Objective:  Vitals:    04/05/22 1356   BP: (!) 142/101   Pulse: 96   Resp: 19   Weight: 62.6 kg (138 lb)   Height: 5' 3" (1.6 m)   PainSc:   8         Physical Exam  Vitals and nursing note reviewed. Exam conducted with a chaperone present.   Constitutional:       General: She is awake. She is not in acute distress.     Appearance: Normal appearance. She is not ill-appearing.   HENT:      Head: Normocephalic and atraumatic.      Nose: Nose normal.      Mouth/Throat:      Mouth: Mucous membranes are moist.      Pharynx: Oropharynx is clear.   Eyes:      Conjunctiva/sclera: Conjunctivae normal.      Pupils: Pupils are equal, round, and reactive to light.   Cardiovascular:      Rate and Rhythm: Normal rate.   Pulmonary:      Effort: Pulmonary effort is normal. No respiratory distress.   Abdominal:      Palpations: Abdomen is soft.      Tenderness: There is no guarding.   Musculoskeletal:         General: Normal range of motion.      Cervical back: Normal range of motion and neck supple. Tenderness present. No rigidity.      Thoracic back: Tenderness present.      Lumbar back: Tenderness present.   Skin:     General: Skin is warm and dry.      Coloration: Skin is not jaundiced or pale.   Neurological:      General: No focal deficit present.      Mental Status: She is alert and oriented to person, place, and time. Mental status is at baseline.      Cranial Nerves: Cranial nerves are intact. No cranial nerve deficit (II-XII).   Psychiatric:         Mood and Affect: Mood normal.         Behavior: Behavior normal. Behavior is cooperative.         Thought Content: Thought content normal.           No orders of the " defined types were placed in this encounter.       X-Ray Lumbar Complete Including Flex And Ext  Narrative: EXAMINATION:  XR LUMBAR SPINE 5 VIEW WITH FLEX AND EXT    CLINICAL HISTORY:  Dorsalgia, unspecifiedBack pain or radiculopathy, > 6 wks;    COMPARISON:  Lumbar spine x-ray May 18, 2016    TECHNIQUE:  Frontal and lateral views of the lumbosacral spine. Lateral views obtained in the neutral, flexion, and extension positions.    FINDINGS:  Vertebral body heights and alignment appear maintained.  Disc spaces appear maintained.  Impression: As above.    Point of Service: [facility]    Electronically signed by: Naren Portillo  Date:    05/24/2021  Time:    15:00       Office Visit on 03/02/2022   Component Date Value Ref Range Status    POC Amphetamines 03/02/2022 Negative  Negative, Inconclusive Final    POC Barbiturates 03/02/2022 Negative  Negative, Inconclusive Final    POC Benzodiazepines 03/02/2022 Negative  Negative, Inconclusive Final    POC Cocaine 03/02/2022 Negative  Negative, Inconclusive Final    POC THC 03/02/2022 Negative  Negative, Inconclusive Final    POC Methadone 03/02/2022 Negative  Negative, Inconclusive Final    POC Methamphetamine 03/02/2022 Negative  Negative, Inconclusive Final    POC Opiates 03/02/2022 Presumptive Positive (A) Negative, Inconclusive Final    POC Oxycodone 03/02/2022 Negative  Negative, Inconclusive Final    POC Phencyclidine 03/02/2022 Negative  Negative, Inconclusive Final    POC Methylenedioxymethamphetamine * 03/02/2022 Negative  Negative, Inconclusive Final    POC Tricyclic Antidepressants 03/02/2022 Negative  Negative, Inconclusive Final    POC Buprenorphine 03/02/2022 Negative   Final     Acceptable 03/02/2022 Yes   Final    POC Temperature (Urine) 03/02/2022 92   Final           Assessment:      1. Multiple sclerosis    2. Cervical radiculopathy    3. Lumbosacral radiculopathy            A's of Opioid Risk  Assessment  Activity:Patient can perform ADL.   Analgesia:Patients pain is partially controlled by current medication. Patient has tried OTC medications such as Tylenol and Ibuprofen with out relief.   Adverse Effects: Patient denies constipation or sedation.  Aberrant Behavior:  reviewed with no aberrant drug seeking/taking behavior.  Overdose reversal drug naloxone discussed    Drug screen reviewed      Requested Prescriptions     Signed Prescriptions Disp Refills    amitriptyline (ELAVIL) 25 MG tablet 30 tablet 2     Sig: Take 1 tablet (25 mg total) by mouth nightly.    baclofen (LIORESAL) 10 MG tablet 90 tablet 2     Sig: Take 1 tablet (10 mg total) by mouth 3 (three) times daily.    gabapentin (NEURONTIN) 300 MG capsule 90 capsule 2     Sig: Take 1 capsule (300 mg total) by mouth every 8 (eight) hours.    tiZANidine 4 mg Cap 60 capsule 2     Sig: Take 1 capsule by mouth every 12 (twelve) hours.    HYDROcodone-acetaminophen (NORCO)  mg per tablet 90 tablet 0     Sig: Take 1 tablet by mouth every 8 (eight) hours.    HYDROcodone-acetaminophen (NORCO)  mg per tablet 90 tablet 0     Sig: Take 1 tablet by mouth every 8 (eight) hours.    HYDROcodone-acetaminophen (NORCO)  mg per tablet 90 tablet 0     Sig: Take 1 tablet by mouth every 8 (eight) hours.    LIDOcaine (LIDODERM) 5 % 30 patch 2     Sig: Place 1 patch onto the skin once daily. Remove & Discard patch within 12 hours or as directed by MD         Plan:    Complaint of muscle spasms midback area ongoing for several weeks currently doing physical therapy she states the therapist has tried relieve the muscle spasms with not having any luck    She is requesting injections day for muscle spasms of back and joint pain    Complaint back and leg pain radicular in nature worse with standing walking laughing or coughing    After discussing options risks benefits recommend we proceed with    Continue physical therapy    Toradol 60 mg IM,  tolerated well  Nor flex 60 mg IM, tolerated well    Continue current medication     Follow-up  3 months     Dr. Gaston, March 2023    Bring original prescription medication bottles/container/box with labels to each visit

## 2022-04-06 ENCOUNTER — CLINICAL SUPPORT (OUTPATIENT)
Dept: REHABILITATION | Facility: HOSPITAL | Age: 42
End: 2022-04-06
Attending: PAIN MEDICINE
Payer: COMMERCIAL

## 2022-04-06 DIAGNOSIS — R20.2 NUMBNESS AND TINGLING: ICD-10-CM

## 2022-04-06 DIAGNOSIS — M25.60 DECREASED RANGE OF MOTION: Primary | ICD-10-CM

## 2022-04-06 DIAGNOSIS — R20.0 NUMBNESS AND TINGLING: ICD-10-CM

## 2022-04-06 DIAGNOSIS — R52 PAIN: ICD-10-CM

## 2022-04-06 DIAGNOSIS — R53.1 DECREASED STRENGTH: ICD-10-CM

## 2022-04-06 PROCEDURE — 97140 MANUAL THERAPY 1/> REGIONS: CPT

## 2022-04-06 NOTE — PROGRESS NOTES
Physical Therapy Treatment Note     Name: Pili Love  Clinic Number: [id number]    Therapy Diagnosis:   Encounter Diagnoses   Name Primary?    Decreased range of motion Yes    Decreased strength     Pain     Numbness and tingling      Physician: Twyla Gaston MD    Visit Date: 4/6/2022    Physician Orders: PT Eval and Treat   Medical Diagnosis from Referral: Cervical/lumbar radiculopathy  Evaluation Date: 3/10/2022  Authorization Period Expiration: 3/2/23  Plan of Care Expiration: 4/12/22  Visit # / Visits authorized: 3/ 13      Time In:  137pm  Time Out:  215 pm  Total Billable Time:  38 minutes    Precautions: Standard, Multiple Sclerosis      Subjective     Pt reports: Yesterday muscle relaxer shot and pain shot in both hips. Feeling better today in the hips. The right upper trapezius feels more relaxed and better from the dry needling.  Currently it feels twisted in the low/mid back and sends burning sensation into both feet with right worse than left.     She was compliant with home exercise program.    Pain: 7/10      Objective     (Not today)  PILI received therapeutic exercises to develop strength, endurance, ROM, flexibility, posture and core stabilization for 12 minutes including:  UBE x 3 min  Pulleys x 10 with 10 sec hold  Supine cane flexion x 10      PILI received the following manual therapy techniques: Myofacial release and Soft tissue Mobilization were applied to the: bilateral longissimus, iliocostalis, spinalis muscles for 38 minutes, including:  POSTERIOR TO ANTERIOR right rotation T8/9 with audible release  Myofascial release deep tissue including percussion gun to bilateral mid/low lumbar paraspinals     Dry Needling Assessment:  Patient demonstrates appropriate response to Functional Dry Needling.  Objective:  See EMR under MEDIA for written consent provided previous session.   Palpation Assessment to determine the necessity for Functional Dry Needling: tender trigger point  and muscle guarding.   PILI received the following manual therapy techniques: myofascial release to bilateral cervical musculature   Plan:   Monitor response to Functional Dry Needling. Continue with Functional Dry Needling in plan of care as appropriate       (not today)  PROM x 8 min to right shoulder  PILI received the following supervised modalities after being cleared for contradictions: IFC Electrical Stimulation:  Pili received IFC Electrical Stimulation for pain control applied to the right shoulder x 12 minutes. Pili tolderated treatment well without any adverse effects.    PILI received hot pack for 12 minutes to right shoulder.    Home Exercises Provided and Patient Education Provided     Education provided: dry needling outcomes      Assessment     Patient has improved symptoms after the dry needling to the trapezius muscle. Today the patient received manual therapy with release to the thoracic and lumbar spine. Myofascial release to bilateral paraspinals that also included percussion gun and dry needling. Patient felt relief after session.  Patient only being seen 1 x weekly for personal reasons. Dry needling with 3 x 40mm into bilateral longissimus of T/9 and paraspinals.      PILI Is progressing well towards her goals.   Pt prognosis is Fair.     Pt will continue to benefit from skilled outpatient physical therapy to address the deficits listed in the problem list box on initial evaluation, provide pt/family education and to maximize pt's level of independence in the home and community environment.     Pt's spiritual, cultural and educational needs considered and pt agreeable to plan of care and goals.     Anticipated barriers to physical therapy: multiple deficits    Goals:    Short Term Goals: 4 weeks   1. Patient will be able to complete bed mobility with no difficulty  2. Patient will have manual muscle test of 3+/5 for upper and lower extremities   3. Patient will be able  to maintain ~ 5 sec balance each leg without loss of balance   4. Patient will report no numbness in legs/arms     Long Term Goals: 8 weeks   1. Patient will be independent with activities of daily living   2. Patient will have manual muscle test of 4-/5 for upper/lower extremities   3. Patient will be able to sleep through the night without waking from pain  4. Patient will complete activities of daily living with 4/10 pain       Plan     Continue to progress toward goals and add to home exercise program     LANE ZURITA, PT  4/6/2022

## 2022-04-12 ENCOUNTER — CLINICAL SUPPORT (OUTPATIENT)
Dept: REHABILITATION | Facility: HOSPITAL | Age: 42
End: 2022-04-12
Attending: PAIN MEDICINE
Payer: COMMERCIAL

## 2022-04-12 DIAGNOSIS — M25.60 DECREASED RANGE OF MOTION: Primary | ICD-10-CM

## 2022-04-12 DIAGNOSIS — R20.0 NUMBNESS AND TINGLING: ICD-10-CM

## 2022-04-12 DIAGNOSIS — R20.2 NUMBNESS AND TINGLING: ICD-10-CM

## 2022-04-12 DIAGNOSIS — R52 PAIN: ICD-10-CM

## 2022-04-12 DIAGNOSIS — R53.1 DECREASED STRENGTH: ICD-10-CM

## 2022-04-12 PROCEDURE — 97035 APP MDLTY 1+ULTRASOUND EA 15: CPT

## 2022-04-12 PROCEDURE — 97140 MANUAL THERAPY 1/> REGIONS: CPT

## 2022-04-12 NOTE — PROGRESS NOTES
Physical Therapy Updated Plan of Care     Name: Pili Love  Clinic Number: [id number]    Therapy Diagnosis:   Encounter Diagnoses   Name Primary?    Decreased range of motion Yes    Decreased strength     Pain     Numbness and tingling      Physician: Twyla Gaston MD    Visit Date: 4/12/2022    Physician Orders: PT Eval and Treat   Medical Diagnosis from Referral: Cervical/lumbar radiculopathy  Evaluation Date: 3/10/2022  Authorization Period Expiration: 3/2/23  Plan of Care Expiration: 5/14/22  Visit # / Visits authorized: 4/ 13      Time In:  240pm  Time Out:  330 pm  Total Billable Time:  50 minutes    Precautions: Standard, Multiple Sclerosis      Subjective     Pt reports: The low back feels better since last session. The left side of the neck feels like a vice  and causes headaches.     She was compliant with home exercise program.    Pain: 7/10      Objective     (Not today)  PILI received therapeutic exercises to develop strength, endurance, ROM, flexibility, posture and core stabilization for 12 minutes including:  UBE x 3 min  Pulleys x 10 with 10 sec hold  Supine cane flexion x 10      PILI received the following manual therapy techniques: Myofacial release and Soft tissue Mobilization were applied to the: bilateral cervical paraspinals in prone x 15 min     Dry Needling Assessment:  Patient demonstrates appropriate response to Functional Dry Needling.  Objective:  See EMR under MEDIA for written consent provided previous session.   Palpation Assessment to determine the necessity for Functional Dry Needling: tender trigger point and muscle guarding.   PILI received the following manual therapy techniques: myofascial release to bilateral cervical musculature   Plan:   Monitor response to Functional Dry Needling. Continue with Functional Dry Needling in plan of care as appropriate     US x 8 min 3mHz 0.8 w/cm2 continuous with bio freeze added to gel in prone to cervical  spine      (not today)  PROM x 8 min to right shoulder  PILI received the following supervised modalities after being cleared for contradictions: IFC Electrical Stimulation:  Pili received IFC Electrical Stimulation for pain control applied to the right shoulder x 12 minutes. Pili tolderated treatment well without any adverse effects.    PILI received hot pack for 12 minutes to right shoulder.    Home Exercises Provided and Patient Education Provided     Education provided: dry needling outcomes      Assessment     Patient received 6 x 30mm to bilateral cervical paraspinals 4 to left and 3 to right in prone.   Received myofascial release and modality to help with pain control     PILI Is progressing well towards her goals.   Pt prognosis is Fair.     Pt will continue to benefit from skilled outpatient physical therapy to address the deficits listed in the problem list box on initial evaluation, provide pt/family education and to maximize pt's level of independence in the home and community environment.     Pt's spiritual, cultural and educational needs considered and pt agreeable to plan of care and goals.     Anticipated barriers to physical therapy: multiple deficits    Goals:    All Goals Not Met    Short Term Goals: 4 weeks   1. Patient will be able to complete bed mobility with no difficulty  2. Patient will have manual muscle test of 3+/5 for upper and lower extremities   3. Patient will be able to maintain ~ 5 sec balance each leg without loss of balance   4. Patient will report no numbness in legs/arms     Long Term Goals: 8 weeks   1. Patient will be independent with activities of daily living   2. Patient will have manual muscle test of 4-/5 for upper/lower extremities   3. Patient will be able to sleep through the night without waking from pain  4. Patient will complete activities of daily living with 4/10 pain       Reasons for Recertification of Therapy: to continue to progress  toward goals    Plan     Updated Certification Period: 4/12/2022 to 5/14/22  Recommended Treatment Plan: 2 times per week for 8 weeks: Cervical/Lumbar Traction, Electrical Stimulation IFC/Prmeod, Iontophoresis (with Dex), Manual Therapy, Moist Heat/ Ice, Neuromuscular Re-ed, Patient Education, Therapeutic Exercise, Ultrasound and Dry Needling  Other Recommendations: none    LANE ZURITA, PT  4/12/2022      I CERTIFY THE NEED FOR THESE SERVICES FURNISHED UNDER THIS PLAN OF TREATMENT AND WHILE UNDER MY CARE.    Physician's comments:      Physician's Signature: ___________________________________________________

## 2022-04-19 ENCOUNTER — CLINICAL SUPPORT (OUTPATIENT)
Dept: REHABILITATION | Facility: HOSPITAL | Age: 42
End: 2022-04-19
Attending: PAIN MEDICINE
Payer: COMMERCIAL

## 2022-04-19 DIAGNOSIS — R52 PAIN: ICD-10-CM

## 2022-04-19 DIAGNOSIS — M25.60 DECREASED RANGE OF MOTION: Primary | ICD-10-CM

## 2022-04-19 DIAGNOSIS — R20.2 NUMBNESS AND TINGLING: ICD-10-CM

## 2022-04-19 DIAGNOSIS — R20.0 NUMBNESS AND TINGLING: ICD-10-CM

## 2022-04-19 DIAGNOSIS — R53.1 DECREASED STRENGTH: ICD-10-CM

## 2022-04-19 PROCEDURE — 97110 THERAPEUTIC EXERCISES: CPT

## 2022-04-19 PROCEDURE — 97112 NEUROMUSCULAR REEDUCATION: CPT

## 2022-04-19 PROCEDURE — 97140 MANUAL THERAPY 1/> REGIONS: CPT

## 2022-04-19 NOTE — PROGRESS NOTES
Physical Therapy Treatment Note     Name: Pili Love  Clinic Number: [id number]    Therapy Diagnosis:   Encounter Diagnoses   Name Primary?    Decreased range of motion Yes    Decreased strength     Pain     Numbness and tingling      Physician: Twyla Gaston MD    Visit Date: 4/19/2022    Physician Orders: PT Eval and Treat   Medical Diagnosis from Referral: Cervical/lumbar radiculopathy  Evaluation Date: 3/10/2022  Authorization Period Expiration: 3/2/23  Plan of Care Expiration: 5/14/22  Visit # / Visits authorized: 5/ 13      Time In:  235pm  Time Out: 315pm  Total Billable Time:  40 minutes    Precautions: Standard, Multiple Sclerosis      Subjective     Pt reports: The needles really helped the neck. The shoulder/neck/low back are hurting today    She was compliant with home exercise program.    Pain: 7/10      Objective     PILI received therapeutic exercises to develop strength, endurance, ROM, flexibility, posture and core stabilization for 18 minutes including:  UBE x 3 min  Pulleys x 10 with 10 sec hold  Supine cane flexion x 10  Discussed home exercise program       PILI received the following manual therapy techniques: Myofacial release and Soft tissue Mobilization were applied to the: bilateral cervical paraspinals in prone x 10 min   PROM x 10 min for right shoulder    Neuromuscular Re-education  X 15 min  SL external rotation 1lb x 30  Scapular retractions x 30  Prone bilateral shoulder extension with scapular retraction 1lb x 30    Home Exercises Provided and Patient Education Provided     Education provided: home exercise program       Assessment     Added to home exercise program for shoulder strengthening and scapular stability exercises. Patient tends to compensate with shrugging during shoulder flexion so discussed the importance of scapular stability to ease the continual pain.       PILI Is progressing well towards her goals.   Pt prognosis is Fair.     Pt will  continue to benefit from skilled outpatient physical therapy to address the deficits listed in the problem list box on initial evaluation, provide pt/family education and to maximize pt's level of independence in the home and community environment.     Pt's spiritual, cultural and educational needs considered and pt agreeable to plan of care and goals.     Anticipated barriers to physical therapy: multiple deficits    Goals:    All Goals Not Met    Short Term Goals: 4 weeks   1. Patient will be able to complete bed mobility with no difficulty  2. Patient will have manual muscle test of 3+/5 for upper and lower extremities   3. Patient will be able to maintain ~ 5 sec balance each leg without loss of balance   4. Patient will report no numbness in legs/arms     Long Term Goals: 8 weeks   1. Patient will be independent with activities of daily living   2. Patient will have manual muscle test of 4-/5 for upper/lower extremities   3. Patient will be able to sleep through the night without waking from pain  4. Patient will complete activities of daily living with 4/10 pain       Reasons for Recertification of Therapy: to continue to progress toward goals    Plan     Updated Certification Period: 4/19/2022 to 5/14/22  Recommended Treatment Plan: 2 times per week for 8 weeks: Cervical/Lumbar Traction, Electrical Stimulation IFC/Prmeod, Iontophoresis (with Dex), Manual Therapy, Moist Heat/ Ice, Neuromuscular Re-ed, Patient Education, Therapeutic Exercise, Ultrasound and Dry Needling  Other Recommendations: none    LANE ZURITA, PT  4/19/2022      I CERTIFY THE NEED FOR THESE SERVICES FURNISHED UNDER THIS PLAN OF TREATMENT AND WHILE UNDER MY CARE.    Physician's comments:      Physician's Signature: ___________________________________________________

## 2022-04-26 ENCOUNTER — CLINICAL SUPPORT (OUTPATIENT)
Dept: REHABILITATION | Facility: HOSPITAL | Age: 42
End: 2022-04-26
Attending: PAIN MEDICINE
Payer: COMMERCIAL

## 2022-04-26 DIAGNOSIS — M25.60 DECREASED RANGE OF MOTION: Primary | ICD-10-CM

## 2022-04-26 DIAGNOSIS — R20.0 NUMBNESS AND TINGLING: ICD-10-CM

## 2022-04-26 DIAGNOSIS — R53.1 DECREASED STRENGTH: ICD-10-CM

## 2022-04-26 DIAGNOSIS — R20.2 NUMBNESS AND TINGLING: ICD-10-CM

## 2022-04-26 DIAGNOSIS — R52 PAIN: ICD-10-CM

## 2022-04-26 PROCEDURE — 97112 NEUROMUSCULAR REEDUCATION: CPT

## 2022-04-26 PROCEDURE — 97012 MECHANICAL TRACTION THERAPY: CPT

## 2022-04-26 NOTE — PROGRESS NOTES
Physical Therapy Treatment Note     Name: Pili Love  [facility] Number: [id number]    Therapy Diagnosis:   Encounter Diagnoses   Name Primary?    Decreased range of motion Yes    Decreased strength     Pain     Numbness and tingling      Physician: Twyla Gaston MD    Visit Date: 4/26/2022    Physician Orders: PT Eval and Treat   Medical Diagnosis from Referral: Cervical/lumbar radiculopathy  Evaluation Date: 3/10/2022  Authorization Period Expiration: 3/2/23  Plan of Care Expiration: 5/14/22  Visit # / Visits authorized: 6/ 13      Time In:  240pm  Time Out: 345 pm  Total Billable Time:  55 minutes    Precautions: Standard, Multiple Sclerosis      Subjective     Pt reports: The exercises last session really helped in the shoulder area last session. Yesterday I was helping my spouse who has vertigo and he fell back. I feel like I have nerve pain and it hurts along the right side of the low back and into the hip today.     She was compliant with home exercise program.    Pain: 7/10      Objective     (Not today)  PILI received therapeutic exercises to develop strength, endurance, ROM, flexibility, posture and core stabilization for 18 minutes including:  UBE x 3 min  Pulleys x 10 with 10 sec hold  Supine cane flexion x 10  Discussed home exercise program     (not today)  PILI received the following manual therapy techniques: Myofacial release and Soft tissue Mobilization were applied to the: bilateral cervical paraspinals in prone x 10 min   PROM x 10 min for right shoulder        Neuromuscular Re-education  X 25 min  Prone unilateral 1lb shoulder extension x 30 each  Prone unilateral 1lb shoulder row x 30 each  Prone bilateral shoulder extension with scapular retraction 1lb x 30    (not today)  SL external rotation 1lb x 30  Scapular retractions x 30      Lumbar mechanical traction x 15 min 45/15 rest 37lb pull supine 90/90    Home Exercises Provided and Patient Education Provided     Education  "provided: home exercise program       Assessment     Patient had a high pain level today after the incident with her spouse. Traction seemed to ease her symptoms and she felt better but still had residual pain in the right low back indicative of centralization secondary to not having the pain in the hip. Patient took her time during exercises and focused on scapular retraction and proper body mechanics with all repetitions. After neuro re-ed exercises patient states she felt she had been "worked on" and may need to stop at that point. Will re-assess traction outcome next session.       ROXY Is progressing well towards her goals.   Pt prognosis is Fair.     Pt will continue to benefit from skilled outpatient physical therapy to address the deficits listed in the problem list box on initial evaluation, provide pt/family education and to maximize pt's level of independence in the home and community environment.     Pt's spiritual, cultural and educational needs considered and pt agreeable to plan of care and goals.     Anticipated barriers to physical therapy: multiple deficits    Goals:    Short Term Goals: 4 weeks   1. Patient will be able to complete bed mobility with no difficulty  2. Patient will have manual muscle test of 3+/5 for upper and lower extremities   3. Patient will be able to maintain ~ 5 sec balance each leg without loss of balance   4. Patient will report no numbness in legs/arms     Long Term Goals: 8 weeks   1. Patient will be independent with activities of daily living   2. Patient will have manual muscle test of 4-/5 for upper/lower extremities   3. Patient will be able to sleep through the night without waking from pain  4. Patient will complete activities of daily living with 4/10 pain       Plan     Add to home exercise program for scapular stabilization and traction for decompression if necessary      Updated Certification Period: 4/26/2022 to 5/14/22  Recommended Treatment Plan: 2 times " per week for 8 weeks: Cervical/Lumbar Traction, Electrical Stimulation IFC/Prmeod, Iontophoresis (with Dex), Manual Therapy, Moist Heat/ Ice, Neuromuscular Re-ed, Patient Education, Therapeutic Exercise, Ultrasound and Dry Needling  Other Recommendations: none    LANE ZURITA, PT  4/26/2022

## 2022-05-10 ENCOUNTER — CLINICAL SUPPORT (OUTPATIENT)
Dept: REHABILITATION | Facility: HOSPITAL | Age: 42
End: 2022-05-10
Attending: PAIN MEDICINE
Payer: COMMERCIAL

## 2022-05-10 DIAGNOSIS — R53.1 DECREASED STRENGTH: ICD-10-CM

## 2022-05-10 DIAGNOSIS — M25.60 DECREASED RANGE OF MOTION: ICD-10-CM

## 2022-05-10 DIAGNOSIS — R20.0 NUMBNESS AND TINGLING: ICD-10-CM

## 2022-05-10 DIAGNOSIS — R52 PAIN: Primary | ICD-10-CM

## 2022-05-10 DIAGNOSIS — R20.2 NUMBNESS AND TINGLING: ICD-10-CM

## 2022-05-10 PROCEDURE — 97140 MANUAL THERAPY 1/> REGIONS: CPT

## 2022-05-10 PROCEDURE — 97014 ELECTRIC STIMULATION THERAPY: CPT

## 2022-05-10 NOTE — PROGRESS NOTES
Physical Therapy Updated Plan of Care     Name: Pili Love  Clinic Number: [id number]    Therapy Diagnosis:   Encounter Diagnoses   Name Primary?    Decreased range of motion Yes    Decreased strength     Pain     Numbness and tingling      Physician: Twyla Gaston MD    Visit Date: 5/10/2022    Physician Orders: PT Eval and Treat   Medical Diagnosis from Referral: Cervical/lumbar radiculopathy  Evaluation Date: 3/10/2022  Authorization Period Expiration: 3/2/23  Plan of Care Expiration: 6/12/22  Visit # / Visits authorized: 7/ 13      Time In:  235pm  Time Out: 315 pm  Total Billable Time: 40 minutes    Precautions: Standard, Multiple Sclerosis      Subjective     Pt reports: Patient states the neck hurts both sides. Dry needling has helped the best so far.    She was compliant with home exercise program.    Pain: 7.5/10      Objective     (Not today)  PILI received therapeutic exercises to develop strength, endurance, ROM, flexibility, posture and core stabilization for 18 minutes including:  UBE x 3 min  Pulleys x 10 with 10 sec hold  Supine cane flexion x 10  Discussed home exercise program      PILI received the following manual therapy techniques: Myofacial release and Soft tissue Mobilization were applied to the: subcranial and cervical musculature including cervical stretching.   High velocity low amplitude applied to left C4 and right C5/6 with audible release. X 25 min  Dry Needling Assessment:  Patient demonstrates appropriate response to Functional Dry Needling.  Objective:  See EMR under MEDIA for written consent provided 5/10/2022.   Palpation Assessment to determine the necessity for Functional Dry Needling: tender trigger point and muscle guarding.   PILI received the following manual therapy techniques: see above  Plan:   Monitor response to Functional Dry Needling. Continue with Functional Dry Needling in plan of care as appropriate       (not today)  Neuromuscular  Re-education  X 25 min  Prone unilateral 1lb shoulder extension x 30 each  Prone unilateral 1lb shoulder row x 30 each  Prone bilateral shoulder extension with scapular retraction 1lb x 30    (not today)  SL external rotation 1lb x 30  Scapular retractions x 30    (not today)  Lumbar mechanical traction x 15 min 45/15 rest 37lb pull supine 90/90    IFC 4 pole with MHP to cervical musculature x 15 min     Home Exercises Provided and Patient Education Provided     Education provided: self stretch cervical musculature      Assessment     Patient felt relief after high velocity low amplitude. Focused on gentle myofascial release and stretching to ease the affects from high velocity low amplitude. Completed dry needling with 4 x 50mm (2 to each upper trapezius)  Finished with passive cervical stretching, myofascial release and subcranial inhibition. Patient states she felt relief after session and instructed to self stretch at home. Will assess outcome from manual manipulation next session    ROXY Is progressing well towards her goals.   Pt prognosis is Fair.     Pt will continue to benefit from skilled outpatient physical therapy to address the deficits listed in the problem list box on initial evaluation, provide pt/family education and to maximize pt's level of independence in the home and community environment.     Pt's spiritual, cultural and educational needs considered and pt agreeable to plan of care and goals.     Anticipated barriers to physical therapy: multiple deficits    Goals:    All Goals Not Met     Short Term Goals: 4 weeks   1. Patient will be able to complete bed mobility with no difficulty  2. Patient will have manual muscle test of 3+/5 for upper and lower extremities   3. Patient will be able to maintain ~ 5 sec balance each leg without loss of balance   4. Patient will report no numbness in legs/arms     Long Term Goals: 8 weeks   1. Patient will be independent with activities of daily living    2. Patient will have manual muscle test of 4-/5 for upper/lower extremities   3. Patient will be able to sleep through the night without waking from pain  4. Patient will complete activities of daily living with 4/10 pain     Reasons for Recertification of Therapy: to continue to progress toward goals    Plan     Updated Certification Period: 05/10/22 to 6/12/22  Recommended Treatment Plan: 2 times per week for 8 weeks: Cervical/Lumbar Traction, Electrical Stimulation IFC/Premod, Iontophoresis (with Dex), Manual Therapy, Moist Heat/ Ice, Neuromuscular Re-ed, Patient Education, Therapeutic Exercise, Ultrasound and Dry Needling  Other Recommendations: none     LANE ZURITA, PT  5/11/2022      I CERTIFY THE NEED FOR THESE SERVICES FURNISHED UNDER THIS PLAN OF TREATMENT AND WHILE UNDER MY CARE.    Physician's comments:      Physician's Signature: ___________________________________________________

## 2022-05-17 ENCOUNTER — CLINICAL SUPPORT (OUTPATIENT)
Dept: REHABILITATION | Facility: HOSPITAL | Age: 42
End: 2022-05-17
Attending: PAIN MEDICINE
Payer: COMMERCIAL

## 2022-05-17 DIAGNOSIS — R20.0 NUMBNESS AND TINGLING: ICD-10-CM

## 2022-05-17 DIAGNOSIS — M25.60 DECREASED RANGE OF MOTION: Primary | ICD-10-CM

## 2022-05-17 DIAGNOSIS — R53.1 DECREASED STRENGTH: ICD-10-CM

## 2022-05-17 DIAGNOSIS — R52 PAIN: ICD-10-CM

## 2022-05-17 DIAGNOSIS — R20.2 NUMBNESS AND TINGLING: ICD-10-CM

## 2022-05-17 PROCEDURE — 97110 THERAPEUTIC EXERCISES: CPT | Mod: CQ

## 2022-05-17 PROCEDURE — 97012 MECHANICAL TRACTION THERAPY: CPT | Mod: CQ

## 2022-05-17 NOTE — PROGRESS NOTES
Physical Therapy Updated Plan of Care     Name: Pili Love  Clinic Number: [id number]    Therapy Diagnosis:   Encounter Diagnoses   Name Primary?    Decreased range of motion Yes    Decreased strength     Pain     Numbness and tingling      Physician: Twyla Gaston MD    Visit Date: 5/17/2022    Physician Orders: PT Eval and Treat   Medical Diagnosis from Referral: Cervical/lumbar radiculopathy  Evaluation Date: 3/10/2022  Authorization Period Expiration: 3/2/23  Plan of Care Expiration: 6/12/22  Visit # / Visits authorized: 8/ 13      Time In:  1452  Time Out:1525pm  Total Billable Time: 30 minutes    Precautions: Standard, Multiple Sclerosis      Subjective     Pt reports: Patient states neck is good, traction really helped low back and just has some tingling/ burning in hip left.    She was compliant with home exercise program.    Pain: 7 /10 with pain meds      Objective     PILI received therapeutic exercises to develop strength, endurance, ROM, flexibility, posture and core stabilization for 18 minutes including:  UBE x 3 min  Pulleys x 10 with 10 sec hold  Supine cane flexion x 10 NOT THIS VISIT   Discussed home exercise program  Red band EXTERNAL ROTATION x 30      PILI received the following manual therapy techniques: Myofacial release and Soft tissue Mobilization were applied to the: subcranial and cervical musculature including cervical stretching.   High velocity low amplitude applied to left C4 and right C5/6 with audible release. X 0 min         (not today)  Neuromuscular Re-education  X 25 min  Prone unilateral 1lb shoulder extension x 30 each  Prone unilateral 1lb shoulder row x 30 each  Prone bilateral shoulder extension with scapular retraction 1lb x 30    (not today)  SL external rotation 1lb x 30  Scapular retractions x 30      Lumbar mechanical traction x 15 min 45/15 rest 35-40 lb pull supine 90/90    IFC 4 pole with MHP to cervical musculature x 15 min     Home Exercises  Provided and Patient Education Provided     Education provided: self stretch cervical musculature      Assessment     Decreased radicular symptoms during pull but return on release of distraction. Positive posturing for cervical neural tension with mario RAMSEY Is progressing well towards her goals.   Pt prognosis is Fair.     Pt will continue to benefit from skilled outpatient physical therapy to address the deficits listed in the problem list box on initial evaluation, provide pt/family education and to maximize pt's level of independence in the home and community environment.     Pt's spiritual, cultural and educational needs considered and pt agreeable to plan of care and goals.     Anticipated barriers to physical therapy: multiple deficits    Goals:    All Goals Not Met     Short Term Goals: 4 weeks   1. Patient will be able to complete bed mobility with no difficulty  2. Patient will have manual muscle test of 3+/5 for upper and lower extremities   3. Patient will be able to maintain ~ 5 sec balance each leg without loss of balance   4. Patient will report no numbness in legs/arms     Long Term Goals: 8 weeks   1. Patient will be independent with activities of daily living   2. Patient will have manual muscle test of 4-/5 for upper/lower extremities   3. Patient will be able to sleep through the night without waking from pain  4. Patient will complete activities of daily living with 4/10 pain     Reasons for Recertification of Therapy: to continue to progress toward goals    Plan     Updated Certification Period: 05/10/22 to 6/12/22  Recommended Treatment Plan: 2 times per week for 8 weeks: Cervical/Lumbar Traction, Electrical Stimulation IFC/Premod, Iontophoresis (with Dex), Manual Therapy, Moist Heat/ Ice, Neuromuscular Re-ed, Patient Education, Therapeutic Exercise, Ultrasound and Dry Needling  Other Recommendations: none     Yahaira Coon, PTA  5/17/2022      I CERTIFY THE NEED FOR THESE  SERVICES FURNISHED UNDER THIS PLAN OF TREATMENT AND WHILE UNDER MY CARE.    Physician's comments:      Physician's Signature: ___________________________________________________

## 2022-05-24 ENCOUNTER — CLINICAL SUPPORT (OUTPATIENT)
Dept: REHABILITATION | Facility: HOSPITAL | Age: 42
End: 2022-05-24
Attending: PAIN MEDICINE
Payer: COMMERCIAL

## 2022-05-24 DIAGNOSIS — R20.2 NUMBNESS AND TINGLING: ICD-10-CM

## 2022-05-24 DIAGNOSIS — M25.60 DECREASED RANGE OF MOTION: Primary | ICD-10-CM

## 2022-05-24 DIAGNOSIS — R53.1 DECREASED STRENGTH: ICD-10-CM

## 2022-05-24 DIAGNOSIS — R20.0 NUMBNESS AND TINGLING: ICD-10-CM

## 2022-05-24 DIAGNOSIS — R52 PAIN: ICD-10-CM

## 2022-05-24 PROCEDURE — 97140 MANUAL THERAPY 1/> REGIONS: CPT

## 2022-05-24 NOTE — PROGRESS NOTES
Physical Therapy Treatment Note     Name: Pili Love  [facility] Number: [id number]    Therapy Diagnosis:   Encounter Diagnoses   Name Primary?    Decreased range of motion Yes    Decreased strength     Pain     Numbness and tingling      Physician: Twyla Gaston MD    Visit Date: 5/24/2022    Physician Orders: PT Eval and Treat   Medical Diagnosis from Referral: Cervical/lumbar radiculopathy  Evaluation Date: 3/10/2022  Authorization Period Expiration: 3/2/23  Plan of Care Expiration: 6/12/22  Visit # / Visits authorized: 9/ 13      Time In:  240pm  Time Out: 318pm  Total Billable Time:  38 minutes    Precautions: Standard, Multiple Sclerosis      Subjective     Pt reports: Have a pulling along the left side of the neck and have the constant low back pain.    She was compliant with home exercise program.    Pain: 6 /10 with pain meds      Objective     (not today)  PILI received therapeutic exercises to develop strength, endurance, ROM, flexibility, posture and core stabilization for 18 minutes including:  UBE x 3 min  Pulleys x 10 with 10 sec hold  Supine cane flexion x 10 NOT THIS VISIT   Discussed home exercise program  Red band EXTERNAL ROTATION x 30      PILI received the following manual therapy techniques: Myofacial release and Soft tissue Mobilization were applied to the: subcranial and cervical musculature including cervical stretching.   High velocity low amplitude applied to left C5/6 and right C5/6 with audible release and gentle myofascial release with traction after manipulation  Prone Gr IV right lower extremities long axis distraction with right leg placed into hip extension, adduction, internal rotation  MET prone right into hip add/flexion and left extension x 5 with 5 sec hold  Supine shotgun MET 5 x 5 second hold  SL right anterior rotation mobilization Gr IV  Supine MET right hip flexion/left extension into bed x 5 with 5 sec hold  SL positional distraction with right side up x  5 minutes  SL lumbar roll right      (not today)  Neuromuscular Re-education  X 25 min  Prone unilateral 1lb shoulder extension x 30 each  Prone unilateral 1lb shoulder row x 30 each  Prone bilateral shoulder extension with scapular retraction 1lb x 30    (not today)  SL external rotation 1lb x 30  Scapular retractions x 30    (not today)  Lumbar mechanical traction x 15 min 45/15 rest 35-40 lb pull supine 90/90  (not today)  IFC 4 pole with MHP to cervical musculature x 15 min     Home Exercises Provided and Patient Education Provided     Education provided: added to home exercise program with printout    Assessment     Patient felt relief in both the cervical and lumbar spine after treatment session. Added to home exercise program for MET to help assist with the right posterior rotation/outflare position.     ROXY Is progressing well towards her goals.   Pt prognosis is Fair.     Pt will continue to benefit from skilled outpatient physical therapy to address the deficits listed in the problem list box on initial evaluation, provide pt/family education and to maximize pt's level of independence in the home and community environment.     Pt's spiritual, cultural and educational needs considered and pt agreeable to plan of care and goals.     Anticipated barriers to physical therapy: multiple deficits    Goals:    Short Term Goals: 4 weeks   1. Patient will be able to complete bed mobility with no difficulty  2. Patient will have manual muscle test of 3+/5 for upper and lower extremities   3. Patient will be able to maintain ~ 5 sec balance each leg without loss of balance   4. Patient will report no numbness in legs/arms     Long Term Goals: 8 weeks   1. Patient will be independent with activities of daily living   2. Patient will have manual muscle test of 4-/5 for upper/lower extremities   3. Patient will be able to sleep through the night without waking from pain  4. Patient will complete activities of daily  living with 4/10 pain       Plan     Updated Certification Period: 05/10/22 to 6/12/22  Recommended Treatment Plan: 2 times per week for 8 weeks: Cervical/Lumbar Traction, Electrical Stimulation IFC/Premod, Iontophoresis (with Dex), Manual Therapy, Moist Heat/ Ice, Neuromuscular Re-ed, Patient Education, Therapeutic Exercise, Ultrasound and Dry Needling  Other Recommendations: none     LANE ZURITA, PT  5/24/2022

## 2022-05-31 ENCOUNTER — CLINICAL SUPPORT (OUTPATIENT)
Dept: REHABILITATION | Facility: HOSPITAL | Age: 42
End: 2022-05-31
Attending: PAIN MEDICINE
Payer: COMMERCIAL

## 2022-05-31 DIAGNOSIS — R53.1 DECREASED STRENGTH: ICD-10-CM

## 2022-05-31 DIAGNOSIS — R20.0 NUMBNESS AND TINGLING: ICD-10-CM

## 2022-05-31 DIAGNOSIS — M25.60 DECREASED RANGE OF MOTION: Primary | ICD-10-CM

## 2022-05-31 DIAGNOSIS — R52 PAIN: ICD-10-CM

## 2022-05-31 DIAGNOSIS — R20.2 NUMBNESS AND TINGLING: ICD-10-CM

## 2022-05-31 PROCEDURE — 97140 MANUAL THERAPY 1/> REGIONS: CPT

## 2022-05-31 PROCEDURE — 97110 THERAPEUTIC EXERCISES: CPT

## 2022-05-31 NOTE — PROGRESS NOTES
Physical Therapy Treatment Note     Name: Pili Love  [facility] Number: [id number]    Therapy Diagnosis:   Encounter Diagnoses   Name Primary?    Decreased range of motion Yes    Decreased strength     Pain     Numbness and tingling      Physician: Twyla Gaston MD    Visit Date: 5/31/2022    Physician Orders: PT Eval and Treat   Medical Diagnosis from Referral: Cervical/lumbar radiculopathy  Evaluation Date: 3/10/2022  Authorization Period Expiration: 3/2/23  Plan of Care Expiration: 6/12/22  Visit # / Visits authorized: 10/ 13      Time In:  242pm  Time Out: 330pm  Total Billable Time:  52 minutes    Precautions: Standard, Multiple Sclerosis      Subjective     Pt reports: The neck feels so much better. The low back is better but still hurts at the same place.     She was compliant with home exercise program.    Pain: 6 /10 with pain meds      Objective       PILI received therapeutic exercises to develop strength, endurance, ROM, flexibility, posture and core stabilization for 25 minutes including:  SL clams blue x 30  Supine hip abduction blue x 30  Cybex back extension 2 pl x 15  Prone leg extension MET x 10 each leg      (not today)  UBE x 3 min  Pulleys x 10 with 10 sec hold  Supine cane flexion x 10 NOT THIS VISIT   Discussed home exercise program  Red band EXTERNAL ROTATION x 30      PILI received the following manual therapy techniques: Myofacial release and Soft tissue Mobilization were applied to the: pelvis    Supine MET right hip flexion/left extension into therapist x 5 with 5 sec hold  High velocity low amplitude to left innonimate [place] mobilization into posterior rotation x 2  Supine long axis contract/relax left leg to promote posterior rotation 5 repetitions      (not today)  High velocity low amplitude applied to left C5/6 and right C5/6 with audible release and gentle myofascial release with traction after manipulation  Prone Gr IV right lower extremities long axis  distraction with right leg placed into hip extension, adduction, internal rotation  MET prone right into hip add/flexion and left extension x 5 with 5 sec hold  Supine shotgun MET 5 x 5 second hold  SL right anterior rotation mobilization Gr IV  SL positional distraction with right side up x 5 minutes  SL lumbar roll right      (not today)  Neuromuscular Re-education  X 25 min  Prone unilateral 1lb shoulder extension x 30 each  Prone unilateral 1lb shoulder row x 30 each  Prone bilateral shoulder extension with scapular retraction 1lb x 30    (not today)  SL external rotation 1lb x 30  Scapular retractions x 30    (not today)  Lumbar mechanical traction x 15 min 45/15 rest 35-40 lb pull supine 90/90  (not today)  IFC 4 pole with MHP to cervical musculature x 15 min     Home Exercises Provided and Patient Education Provided     Education provided: added to home exercise program with printout    Assessment     Patient continues to have juddering with forward bend showing weak core stability. Added to home exercise program for core stabilization exercises. Patient remains hypomobile to the left innonimate with forward bend. The left remains in anterior rotation while the right remains in posterior rotation. During and after manual techniques to address the innonimate rotations patient felt relief.   Will continue to focus on lumbar exercises and manual as needed.     ROXY Is progressing well towards her goals.   Pt prognosis is Fair.     Pt will continue to benefit from skilled outpatient physical therapy to address the deficits listed in the problem list box on initial evaluation, provide pt/family education and to maximize pt's level of independence in the home and community environment.     Pt's spiritual, cultural and educational needs considered and pt agreeable to plan of care and goals.     Anticipated barriers to physical therapy: multiple deficits    Goals:    Short Term Goals: 4 weeks   1. Patient will be  able to complete bed mobility with no difficulty  2. Patient will have manual muscle test of 3+/5 for upper and lower extremities   3. Patient will be able to maintain ~ 5 sec balance each leg without loss of balance   4. Patient will report no numbness in legs/arms     Long Term Goals: 8 weeks   1. Patient will be independent with activities of daily living   2. Patient will have manual muscle test of 4-/5 for upper/lower extremities   3. Patient will be able to sleep through the night without waking from pain  4. Patient will complete activities of daily living with 4/10 pain       Plan     Updated Certification Period: 05/10/22 to 6/12/22  Recommended Treatment Plan: 2 times per week for 8 weeks: Cervical/Lumbar Traction, Electrical Stimulation IFC/Premod, Iontophoresis (with Dex), Manual Therapy, Moist Heat/ Ice, Neuromuscular Re-ed, Patient Education, Therapeutic Exercise, Ultrasound and Dry Needling  Other Recommendations: none     LANE ZURITA, PT  5/31/2022

## 2022-06-07 ENCOUNTER — CLINICAL SUPPORT (OUTPATIENT)
Dept: REHABILITATION | Facility: HOSPITAL | Age: 42
End: 2022-06-07
Attending: PAIN MEDICINE
Payer: COMMERCIAL

## 2022-06-07 DIAGNOSIS — M25.60 DECREASED RANGE OF MOTION: Primary | ICD-10-CM

## 2022-06-07 DIAGNOSIS — R53.1 DECREASED STRENGTH: ICD-10-CM

## 2022-06-07 DIAGNOSIS — R52 PAIN: ICD-10-CM

## 2022-06-07 DIAGNOSIS — R20.2 NUMBNESS AND TINGLING: ICD-10-CM

## 2022-06-07 DIAGNOSIS — R20.0 NUMBNESS AND TINGLING: ICD-10-CM

## 2022-06-07 PROCEDURE — 97110 THERAPEUTIC EXERCISES: CPT | Mod: CQ

## 2022-06-07 PROCEDURE — 97112 NEUROMUSCULAR REEDUCATION: CPT | Mod: CQ

## 2022-06-07 PROCEDURE — 97140 MANUAL THERAPY 1/> REGIONS: CPT | Mod: CQ

## 2022-06-07 NOTE — PROGRESS NOTES
Physical Therapy Treatment Note     Name: Pili Love  [facility] Number: [id number]    Therapy Diagnosis:   Encounter Diagnoses   Name Primary?    Decreased range of motion Yes    Decreased strength     Pain     Numbness and tingling      Physician: Twyla Gaston MD    Visit Date: 6/7/2022    Physician Orders: PT Eval and Treat   Medical Diagnosis from Referral: Cervical/lumbar radiculopathy  Evaluation Date: 3/10/2022  Authorization Period Expiration: 3/2/23  Plan of Care Expiration: 6/12/22  Visit # / Visits authorized: 10/ 13      Time In:  1433  Time Out: 1530  Total Billable Time:  45 minutes    Precautions: Standard, Multiple Sclerosis      Subjective     Pt reports: The neck feels so much better. The low back is better but still hurts at the same place.     She was compliant with home exercise program.    Pain: 6 /10 with pain meds      Objective       PILI received therapeutic exercises to develop strength, endurance, ROM, flexibility, posture and core stabilization for 25 minutes including:  Bike x 6 min  GS on incline board x 2 min  Transverse abdominus isometrics x 10 with 5 sec hold  PPT x 10 with 10 sec hold  Adduction squeeze with bridge x 20 with 5 sec hold  Prone frog leg pushes x 20 with 5 sec hold  Standing isometrics lat presses x 20 with 5 sec hold      NOT THIS VISIT:   SL clams blue x 30  Supine hip abduction blue x 30  Cybex back extension 2 pl x 15  Prone leg extension MET x 10 each leg      (not today)  UBE x 3 min  Pulleys x 10 with 10 sec hold  Supine cane flexion x 10 NOT THIS VISIT   Discussed home exercise program  Red band EXTERNAL ROTATION x 30      PILI received the following manual therapy techniques x 15 min:   Myofacial release and Soft tissue Mobilization were applied to the: pelvis    MET for anterior rotation on left  MFR to lumbar paraspinals       (not today)  High velocity low amplitude applied to left C5/6 and right C5/6 with audible release and gentle  myofascial release with traction after manipulation  Prone Gr IV right lower extremities long axis distraction with right leg placed into hip extension, adduction, internal rotation  MET prone right into hip add/flexion and left extension x 5 with 5 sec hold  Supine shotgun MET 5 x 5 second hold  SL right anterior rotation mobilization Gr IV  SL positional distraction with right side up x 5 minutes  SL lumbar roll right      (not today)  Neuromuscular Re-education  X 25 min  Prone unilateral 1lb shoulder extension x 30 each  Prone unilateral 1lb shoulder row x 30 each  Prone bilateral shoulder extension with scapular retraction 1lb x 30    (not today)  SL external rotation 1lb x 30  Scapular retractions x 30    (not today)  Lumbar mechanical traction x 15 min 45/15 rest 35-40 lb pull supine 90/90  (not today)  IFC 4 pole with MHP to cervical musculature x 15 min     Home Exercises Provided and Patient Education Provided     Education provided: added to home exercise program with printout    Assessment     Minimal anterior rotation of left anominate and correctly easily with manual mobs and MET.  ROXY Is progressing well towards her goals.   Pt prognosis is Fair.     Pt will continue to benefit from skilled outpatient physical therapy to address the deficits listed in the problem list box on initial evaluation, provide pt/family education and to maximize pt's level of independence in the home and community environment.     Pt's spiritual, cultural and educational needs considered and pt agreeable to plan of care and goals.     Anticipated barriers to physical therapy: multiple deficits    Goals:    Short Term Goals: 4 weeks   1. Patient will be able to complete bed mobility with no difficulty  2. Patient will have manual muscle test of 3+/5 for upper and lower extremities   3. Patient will be able to maintain ~ 5 sec balance each leg without loss of balance   4. Patient will report no numbness in legs/arms      Long Term Goals: 8 weeks   1. Patient will be independent with activities of daily living   2. Patient will have manual muscle test of 4-/5 for upper/lower extremities   3. Patient will be able to sleep through the night without waking from pain  4. Patient will complete activities of daily living with 4/10 pain       Plan     Updated Certification Period: 05/10/22 to 6/12/22  Recommended Treatment Plan: 2 times per week for 8 weeks: Cervical/Lumbar Traction, Electrical Stimulation IFC/Premod, Iontophoresis (with Dex), Manual Therapy, Moist Heat/ Ice, Neuromuscular Re-ed, Patient Education, Therapeutic Exercise, Ultrasound and Dry Needling  Other Recommendations: none     Yahaira Coon, PTA  6/7/2022

## 2022-06-08 NOTE — PROGRESS NOTES
Physical Therapy Treatment Note     Name: Roxy Love  Clinic Number: [id number]    Therapy Diagnosis:   Encounter Diagnoses   Name Primary?    Decreased range of motion Yes    Decreased strength     Pain     Numbness and tingling      Physician: Twyla Gaston MD    Visit Date: 6/8/2022    Physician Orders: PT Eval and Treat   Medical Diagnosis from Referral: Cervical/lumbar radiculopathy  Evaluation Date: 3/10/2022    Time In:  330pm  Time Out: 340pm  Total Billable Time:  10 minutes    Precautions: Standard, Multiple Sclerosis      Subjective     Pt reports: Patient was seen by Yahaira Coon PTA during today's session. Please see her note. Dry needling was completed but no other manual was given by Jenifer NOLASCOT, MTC.       Physical Therapy Functional Dry Needling Note       See EMR under MEDIA for written consent provided previous session     Palpation Assessment to determine the necessity for Functional Dry Needling  Palpation for triggerpoint.     Home Exercises and Patient Education Provided     Education provided:   - outcome of dry needling    Written Handout on response to FDN provided: no    ROXY demonstrated good  understanding of the education provided.       Plan     Monitor response to Functional Dry Needling. Continue with Functional Dry Needling in POC as appropriate.        Assessment   Patient states she felt better after dry needling was applied.   ROXY Is progressing well towards her goals.   Pt prognosis is Fair.     Pt will continue to benefit from skilled outpatient physical therapy to address the deficits listed in the problem list box on initial evaluation, provide pt/family education and to maximize pt's level of independence in the home and community environment.     Pt's spiritual, cultural and educational needs considered and pt agreeable to plan of care and goals.     Anticipated barriers to physical therapy: multiple deficits    Goals:    Short Term Goals: 4  weeks   1. Patient will be able to complete bed mobility with no difficulty  2. Patient will have manual muscle test of 3+/5 for upper and lower extremities   3. Patient will be able to maintain ~ 5 sec balance each leg without loss of balance   4. Patient will report no numbness in legs/arms     Long Term Goals: 8 weeks   1. Patient will be independent with activities of daily living   2. Patient will have manual muscle test of 4-/5 for upper/lower extremities   3. Patient will be able to sleep through the night without waking from pain  4. Patient will complete activities of daily living with 4/10 pain       Plan     Updated Certification Period: 05/10/22 to 6/12/22  Recommended Treatment Plan: 2 times per week for 8 weeks: Cervical/Lumbar Traction, Electrical Stimulation IFC/Premod, Iontophoresis (with Dex), Manual Therapy, Moist Heat/ Ice, Neuromuscular Re-ed, Patient Education, Therapeutic Exercise, Ultrasound and Dry Needling  Other Recommendations: none     LANE ZURITA, PT  6/8/2022

## 2022-07-27 ENCOUNTER — OFFICE VISIT (OUTPATIENT)
Dept: PAIN MEDICINE | Facility: CLINIC | Age: 42
End: 2022-07-27
Payer: COMMERCIAL

## 2022-07-27 VITALS
RESPIRATION RATE: 18 BRPM | DIASTOLIC BLOOD PRESSURE: 83 MMHG | WEIGHT: 133 LBS | OXYGEN SATURATION: 99 % | SYSTOLIC BLOOD PRESSURE: 122 MMHG | HEART RATE: 112 BPM | BODY MASS INDEX: 23.57 KG/M2 | HEIGHT: 63 IN

## 2022-07-27 DIAGNOSIS — Z79.899 ENCOUNTER FOR LONG-TERM (CURRENT) USE OF OTHER MEDICATIONS: ICD-10-CM

## 2022-07-27 DIAGNOSIS — G35 MULTIPLE SCLEROSIS: Chronic | ICD-10-CM

## 2022-07-27 DIAGNOSIS — M47.817 LUMBOSACRAL SPONDYLOSIS WITHOUT MYELOPATHY: Primary | Chronic | ICD-10-CM

## 2022-07-27 DIAGNOSIS — M54.12 CERVICAL RADICULOPATHY: Chronic | ICD-10-CM

## 2022-07-27 DIAGNOSIS — Z11.59 SCREENING FOR VIRAL DISEASE: ICD-10-CM

## 2022-07-27 LAB
CTP QC/QA: YES
POC (AMP) AMPHETAMINE: NEGATIVE
POC (BAR) BARBITURATES: NEGATIVE
POC (BUP) BUPRENORPHINE: NEGATIVE
POC (BZO) BENZODIAZEPINES: ABNORMAL
POC (COC) COCAINE: NEGATIVE
POC (MDMA) METHYLENEDIOXYMETHAMPHETAMINE 3,4: NEGATIVE
POC (MET) METHAMPHETAMINE: NEGATIVE
POC (MOP) OPIATES: ABNORMAL
POC (MTD) METHADONE: NEGATIVE
POC (OXY) OXYCODONE: NEGATIVE
POC (PCP) PHENCYCLIDINE: NEGATIVE
POC (TCA) TRICYCLIC ANTIDEPRESSANTS: NEGATIVE
POC TEMPERATURE (URINE): 92
POC THC: NEGATIVE

## 2022-07-27 PROCEDURE — 3074F PR MOST RECENT SYSTOLIC BLOOD PRESSURE < 130 MM HG: ICD-10-PCS | Mod: CPTII,,, | Performed by: PHYSICIAN ASSISTANT

## 2022-07-27 PROCEDURE — 99214 OFFICE O/P EST MOD 30 MIN: CPT | Mod: S$PBB,,, | Performed by: PHYSICIAN ASSISTANT

## 2022-07-27 PROCEDURE — 1159F MED LIST DOCD IN RCRD: CPT | Mod: CPTII,,, | Performed by: PHYSICIAN ASSISTANT

## 2022-07-27 PROCEDURE — G0481 DRUG TEST DEF 8-14 CLASSES: HCPCS | Mod: ,,, | Performed by: CLINICAL MEDICAL LABORATORY

## 2022-07-27 PROCEDURE — G0481 PR DRUG TEST DEF 8-14 CLASSES: ICD-10-PCS | Mod: ,,, | Performed by: CLINICAL MEDICAL LABORATORY

## 2022-07-27 PROCEDURE — 3074F SYST BP LT 130 MM HG: CPT | Mod: CPTII,,, | Performed by: PHYSICIAN ASSISTANT

## 2022-07-27 PROCEDURE — 3079F PR MOST RECENT DIASTOLIC BLOOD PRESSURE 80-89 MM HG: ICD-10-PCS | Mod: CPTII,,, | Performed by: PHYSICIAN ASSISTANT

## 2022-07-27 PROCEDURE — 3079F DIAST BP 80-89 MM HG: CPT | Mod: CPTII,,, | Performed by: PHYSICIAN ASSISTANT

## 2022-07-27 PROCEDURE — 3008F BODY MASS INDEX DOCD: CPT | Mod: CPTII,,, | Performed by: PHYSICIAN ASSISTANT

## 2022-07-27 PROCEDURE — 1159F PR MEDICATION LIST DOCUMENTED IN MEDICAL RECORD: ICD-10-PCS | Mod: CPTII,,, | Performed by: PHYSICIAN ASSISTANT

## 2022-07-27 PROCEDURE — 99215 OFFICE O/P EST HI 40 MIN: CPT | Mod: PBBFAC | Performed by: PHYSICIAN ASSISTANT

## 2022-07-27 PROCEDURE — 80305 DRUG TEST PRSMV DIR OPT OBS: CPT | Mod: PBBFAC | Performed by: PHYSICIAN ASSISTANT

## 2022-07-27 PROCEDURE — 99214 PR OFFICE/OUTPT VISIT, EST, LEVL IV, 30-39 MIN: ICD-10-PCS | Mod: S$PBB,,, | Performed by: PHYSICIAN ASSISTANT

## 2022-07-27 PROCEDURE — 3008F PR BODY MASS INDEX (BMI) DOCUMENTED: ICD-10-PCS | Mod: CPTII,,, | Performed by: PHYSICIAN ASSISTANT

## 2022-07-27 RX ORDER — GABAPENTIN 300 MG/1
300 CAPSULE ORAL EVERY 8 HOURS
Qty: 90 CAPSULE | Refills: 2 | Status: SHIPPED | OUTPATIENT
Start: 2022-07-27 | End: 2022-09-13 | Stop reason: SDUPTHER

## 2022-07-27 RX ORDER — AMITRIPTYLINE HYDROCHLORIDE 25 MG/1
25 TABLET, FILM COATED ORAL NIGHTLY
Qty: 30 TABLET | Refills: 2 | Status: SHIPPED | OUTPATIENT
Start: 2022-07-27 | End: 2022-09-13 | Stop reason: SDUPTHER

## 2022-07-27 RX ORDER — LIDOCAINE 50 MG/G
1 PATCH TOPICAL DAILY
Qty: 30 PATCH | Refills: 2 | Status: SHIPPED | OUTPATIENT
Start: 2022-07-27 | End: 2022-09-13 | Stop reason: SDUPTHER

## 2022-07-27 RX ORDER — TIZANIDINE HYDROCHLORIDE 4 MG/1
1 CAPSULE, GELATIN COATED ORAL EVERY 12 HOURS
Qty: 60 CAPSULE | Refills: 2 | Status: SHIPPED | OUTPATIENT
Start: 2022-07-27 | End: 2022-09-13 | Stop reason: SDUPTHER

## 2022-07-27 RX ORDER — HYDROCODONE BITARTRATE AND ACETAMINOPHEN 10; 325 MG/1; MG/1
1 TABLET ORAL EVERY 8 HOURS
Qty: 90 TABLET | Refills: 0 | Status: SHIPPED | OUTPATIENT
Start: 2022-08-17 | End: 2022-10-20 | Stop reason: SDUPTHER

## 2022-07-27 RX ORDER — BACLOFEN 10 MG/1
10 TABLET ORAL 3 TIMES DAILY
Qty: 90 TABLET | Refills: 2 | Status: SHIPPED | OUTPATIENT
Start: 2022-07-27 | End: 2022-09-13 | Stop reason: SDUPTHER

## 2022-07-27 NOTE — PROGRESS NOTES
Subjective:      Patient ID: Pili Love is a 41 y.o. female.    Chief Complaint: Back Pain      Pain  This is a chronic problem. The current episode started more than 1 year ago. The problem occurs daily. The problem has been waxing and waning. Associated symptoms include arthralgias, myalgias and neck pain. Pertinent negatives include no change in bowel habit, chills, coughing, fatigue, rash, sore throat, vertigo or vomiting.     Review of Systems   Constitutional: Negative for activity change, appetite change, chills and fatigue.   HENT: Negative for drooling, ear discharge, ear pain, facial swelling, nosebleeds, sore throat, trouble swallowing, voice change and goiter.    Eyes: Negative for photophobia, pain, discharge, redness and visual disturbance.   Respiratory: Negative for apnea, cough, choking, chest tightness, shortness of breath, wheezing and stridor.    Cardiovascular: Negative for palpitations and leg swelling.   Gastrointestinal: Negative for abdominal distention, change in bowel habit, diarrhea, rectal pain, vomiting, fecal incontinence and change in bowel habit.   Endocrine: Negative for cold intolerance, heat intolerance, polydipsia, polyphagia and polyuria.   Genitourinary: Negative for flank pain, frequency and hot flashes.   Musculoskeletal: Positive for arthralgias, myalgias and neck pain.   Integumentary:  Negative for color change, pallor and rash.   Allergic/Immunologic: Negative for immunocompromised state.   Neurological: Negative for dizziness, vertigo, seizures, syncope, facial asymmetry, speech difficulty, light-headedness, disturbances in coordination, memory loss and coordination difficulties.   Hematological: Negative for adenopathy. Does not bruise/bleed easily.   Psychiatric/Behavioral: Negative for agitation, behavioral problems, confusion, decreased concentration, dysphoric mood, hallucinations, self-injury and suicidal ideas. The patient is not nervous/anxious and is not  "hyperactive.             Past Surgical History:   Procedure Laterality Date     bilateral cervical paraspinous muscle trigger point injection Bilateral [id number]    D Shows  x 4     SECTION      HYSTERECTOMY      KNEE CARTILAGE SURGERY Right     LUMBAR PUNCTURE  2016    Dr Trejo    Right C3-C7 FI Right 2018    Dr Trejo    TUBAL LIGATION      VAGINAL DELIVERY         Objective:  Vitals:    22 1402   BP: 122/83   Pulse: (!) 112   Resp: 18   SpO2: 99%   Weight: 60.3 kg (133 lb)   Height: 5' 3" (1.6 m)   PainSc:   6         Physical Exam  Vitals and nursing note reviewed. Exam conducted with a chaperone present.   Constitutional:       General: She is awake. She is not in acute distress.     Appearance: Normal appearance. She is not ill-appearing.   HENT:      Head: Normocephalic and atraumatic.      Nose: Nose normal.      Mouth/Throat:      Mouth: Mucous membranes are moist.      Pharynx: Oropharynx is clear.   Eyes:      Conjunctiva/sclera: Conjunctivae normal.      Pupils: Pupils are equal, round, and reactive to light.   Cardiovascular:      Rate and Rhythm: Normal rate.   Pulmonary:      Effort: Pulmonary effort is normal. No respiratory distress.   Abdominal:      Palpations: Abdomen is soft.      Tenderness: There is no guarding.   Musculoskeletal:         General: Normal range of motion.      Cervical back: Normal range of motion and neck supple. Tenderness present. No rigidity.      Thoracic back: Tenderness present.      Lumbar back: Tenderness present.   Skin:     General: Skin is warm and dry.      Coloration: Skin is not jaundiced or pale.   Neurological:      General: No focal deficit present.      Mental Status: She is alert and oriented to person, place, and time. Mental status is at baseline.      Cranial Nerves: No cranial nerve deficit (II-XII).   Psychiatric:         Mood and Affect: Mood normal.         Behavior: Behavior normal. Behavior is cooperative.         Thought " Content: Thought content normal.           Orders Placed This Encounter   Procedures    COVID-19 Routine Screening     Standing Status:   Future     Standing Expiration Date:   9/25/2023     Order Specific Question:   Is the patient symptomatic?     Answer:   No     Order Specific Question:   Is this needed for pre-procedure or pre-op testing?     Answer:   Yes     Order Specific Question:   Diagnosis:     Answer:   Pre-op testing [184827]    Drug Screen Definitive 14, Urine     Standing Status:   Future     Standing Expiration Date:   9/25/2023     Order Specific Question:   Specimen Source     Answer:   Urine    POCT Urine Drug Screen Presump     Interpretive Information:     Negative:  No drug detected at the cut off level.   Positive:  This result represents presumptive positive for the   tested drug, other substances may yield a positive response other   than the analyte of interest. This result should be utilized for   diagnostic purpose only. Confirmation testing will be performed upon physician request only.       Case Request Operating Room: Block-nerve-medial branch-lumbar, bilateral L4 through S1     Order Specific Question:   Medical Necessity:     Answer:   Medically Non-Urgent [100]     Order Specific Question:   CPT Code:     Answer:   ND INJ DX/THER AGNT PARAVERT FACET JOINT,IMG GUIDE,LUMBAR/SAC,1ST LVL [56750]     Order Specific Question:   CPT Code:     Answer:   ND INJ DX/THER AGNT PARAVERT FACET JOINT,IMG GUIDE,LUMBAR/SAC, 2ND LEVEL [61508]     Order Specific Question:   Is an on-site pathologist required for this procedure?     Answer:   N/A        X-Ray Lumbar Complete Including Flex And Ext  Narrative: EXAMINATION:  XR LUMBAR SPINE 5 VIEW WITH FLEX AND EXT    CLINICAL HISTORY:  Dorsalgia, unspecifiedBack pain or radiculopathy, > 6 wks;    COMPARISON:  Lumbar spine x-ray May 18, 2016    TECHNIQUE:  Frontal and lateral views of the lumbosacral spine. Lateral views obtained in the neutral,  flexion, and extension positions.    FINDINGS:  Vertebral body heights and alignment appear maintained.  Disc spaces appear maintained.  Impression: As above.    Point of Service: [facility]    Electronically signed by: Naren Portillo  Date:    05/24/2021  Time:    15:00       Office Visit on 03/02/2022   Component Date Value Ref Range Status    POC Amphetamines 03/02/2022 Negative  Negative, Inconclusive Final    POC Barbiturates 03/02/2022 Negative  Negative, Inconclusive Final    POC Benzodiazepines 03/02/2022 Negative  Negative, Inconclusive Final    POC Cocaine 03/02/2022 Negative  Negative, Inconclusive Final    POC THC 03/02/2022 Negative  Negative, Inconclusive Final    POC Methadone 03/02/2022 Negative  Negative, Inconclusive Final    POC Methamphetamine 03/02/2022 Negative  Negative, Inconclusive Final    POC Opiates 03/02/2022 Presumptive Positive (A) Negative, Inconclusive Final    POC Oxycodone 03/02/2022 Negative  Negative, Inconclusive Final    POC Phencyclidine 03/02/2022 Negative  Negative, Inconclusive Final    POC Methylenedioxymethamphetamine * 03/02/2022 Negative  Negative, Inconclusive Final    POC Tricyclic Antidepressants 03/02/2022 Negative  Negative, Inconclusive Final    POC Buprenorphine 03/02/2022 Negative   Final     Acceptable 03/02/2022 Yes   Final    POC Temperature (Urine) 03/02/2022 92   Final           Assessment:      1. Lumbosacral spondylosis without myelopathy    2. Cervical radiculopathy    3. Multiple sclerosis    4. Screening for viral disease    5. Encounter for long-term (current) use of other medications            A's of Opioid Risk Assessment  Activity:Patient can perform ADL.   Analgesia:Patients pain is partially controlled by current medication. Patient has tried OTC medications such as Tylenol and Ibuprofen with out relief.   Adverse Effects: Patient denies constipation or sedation.  Aberrant Behavior:  reviewed with no  aberrant drug seeking/taking behavior.  Overdose reversal drug naloxone discussed    Drug screen reviewed      Requested Prescriptions     Signed Prescriptions Disp Refills    amitriptyline (ELAVIL) 25 MG tablet 30 tablet 2     Sig: Take 1 tablet (25 mg total) by mouth nightly.    baclofen (LIORESAL) 10 MG tablet 90 tablet 2     Sig: Take 1 tablet (10 mg total) by mouth 3 (three) times daily.    gabapentin (NEURONTIN) 300 MG capsule 90 capsule 2     Sig: Take 1 capsule (300 mg total) by mouth every 8 (eight) hours.    LIDOcaine (LIDODERM) 5 % 30 patch 2     Sig: Place 1 patch onto the skin once daily. Remove & Discard patch within 12 hours or as directed by MD    tiZANidine 4 mg Cap 60 capsule 2     Sig: Take 1 capsule by mouth every 12 (twelve) hours.    HYDROcodone-acetaminophen (NORCO)  mg per tablet 90 tablet 0     Sig: Take 1 tablet by mouth every 8 (eight) hours.         Plan:    Follows Neurology multiple sclerosis ARM has been referred to [facility]    She states lumbar trigger point injections do help control her discomfort    Complaint of low back pain worse with flexion extension rotation lumbar spine tenderness long facet joint area lower back area facet joint in nature    Chronic neck and joint pain    Requesting lumbar procedure for her back pain    Physical therapy notes reviewed in epic    X-ray lumbar spine [facility] May 2021 multiple level degenerative changes no fracture noted    MRI [facility] lumbar spine degenerative changes multiple levels will place report under media    Continue current medication     Indications for this procedure for this specific patient include the following   - Pt has had symptoms for three months with moderate to severe pain with functional impairment rated of 7/10 pain.   - Pain non-responsive to conservative care.    - Pain predominately axial and not associated with radiculopathy or claudication.    - No non-facet pathology as source of pain.    -  Clinical assessment implicates facet joint as putative pain source.    - Pain is exacerbated by extension or prolonged sitting/standing and relieved by rest.    - No unexplained neurologic deficit.    - No history of coagulopathy, infection or unstable medical conditions.    - Pain is causing significant functional limitation resulting in diminished quality of life and impaired age appropriate ADL's.   - Clinical assessment implicates facet joint as putative source of pain  - Repeat injections not done prior to 7 days   - no more than 2 levels will be done    The planned medically necessary  surgical procedure is performed in a hospital outpatient department and not in an ambulatory surgical center due to:     -there is no geographically assessable ambulatory surgery center that has the  necessary equipment and fluoroscopy needed for the procedure     -there is no geographically assessable ambulatory surgical center available at which the physician has privileges     -an ASC's  specific  guideline regarding the individuals weight or health conditions that prevent the use of an ASC    Monitor anesthesia request is medically indicated for the scheduled nerve block procedure due to:  1- needle phobia and anxiety, placing  the patient at risk during the provided service.  2-patient has an ASA class greater than 3 and requires constant presence of an anesthesiologist during the procedure,   3-patient has severe problems hard to lie still  4-patient suffers from chronic pain and is unable to function due to  diminished ADLs    Schedule bilateral lumbar L4 through S1 medial branch block # 1, lumbosacral spondylosis     Dr. Gaston    Bring original prescription medication bottles/container/box with labels to each visit

## 2022-07-27 NOTE — PATIENT INSTRUCTIONS
COVID SCREENING TO BE DONE 48-72 HOURS PRIOR TO PROCEDURE IF PT HAS NOT RECEIVED BOTH COVID VACCINATIONS OR HAS BEEN VACCINATED WITHIN THE LAST 2 WEEKS. VACCINATION CARD MUST BE PROVIDED IF PT HAS BEEN VACCINATED OR PT MUST HAVE COVID SCREENING IN ORDER TO HAVE PROCEDURE.  IF YOUR PROCEDURE IS ON A Tuesday, GET COVID TESTING ON THE Friday PRIOR TO PROCEDURE.  IF YOUR PROCEDURE IS ON A Thursday GET COVID TESTING ON THE Monday OR Tuesday PRIOR TO PROCEDURE.    IF YOUR PRIMARY CARE DOCTOR ORDERS YOUR COVID TESTING YOU MUST BRING YOUR RESULTS WITH YOU TO YOUR PROCEDURE.    Procedure Instructions:    Nothing to eat or drink for 8 hours or after midnight including gum, candy, mints, or tobacco products.  If you are scheduled for 1:30 or later nothing to eat or drink after 5 a.m. the morning of the procedure, including gum, candy, mints, or tobacco products.  Must have a  at least 18 yrs of age to stay present at all times  No Diabetic medications the morning of procedure, check blood sugar the morning of procedure, if it is greater than 200 call the office at [phone]  If you are started on antibiotics or have been prescribed antibiotics, have a fever, or have any other type of infection call to reschedule procedure.  If you take blood pressure medications you can take it at your regular scheduled time.        HOLD ASPIRIN AND ASPIRIN PRODUCTS  (ASPIRIN, BC POWDER ETC. ) FOR 7 DAYS  PRIOR TO PROCEDURE  HOLD NSAIDS( ibuprofen, mobic, meloxicam, advil, diclofenac, methotrexate, aleve etc....)  FOR 3 DAYS   PRIOR TO PROCEDURE

## 2022-08-01 LAB
6-ACETYLMORPHINE, URINE (RUSH): NEGATIVE 10 NG/ML
7-AMINOCLONAZEPAM, URINE (RUSH): NEGATIVE 25 NG/ML
A-HYDROXYALPRAZOLAM, URINE (RUSH): NEGATIVE 25 NG/ML
ACETYL FENTANYL, URINE (RUSH): NEGATIVE 2.5 NG/ML
ACETYL NORFENTANYL OXALATE, URINE (RUSH): NEGATIVE 5 NG/ML
AMPHET UR QL SCN: NEGATIVE
BENZOYLECGONINE, URINE (RUSH): NEGATIVE 100 NG/ML
BUPRENORPHINE UR QL SCN: NEGATIVE 25 NG/ML
CODEINE, URINE (RUSH): NEGATIVE 25 NG/ML
CREAT UR-MCNC: 110 MG/DL (ref 28–219)
EDDP, URINE (RUSH): NEGATIVE 25 NG/ML
FENTANYL, URINE (RUSH): NEGATIVE 2.5 NG/ML
HYDROCODONE, URINE (RUSH): >250 25 NG/ML
HYDROMORPHONE, URINE (RUSH): >250 25 NG/ML
LORAZEPAM, URINE (RUSH): NEGATIVE 25 NG/ML
METHADONE UR QL SCN: NEGATIVE 25 NG/ML
METHAMPHET UR QL SCN: NEGATIVE
MORPHINE, URINE (RUSH): NEGATIVE 25 NG/ML
NORBUPRENORPHINE, URINE (RUSH): NEGATIVE 25 NG/ML
NORDIAZEPAM, URINE (RUSH): NEGATIVE 25 NG/ML
NORFENTANYL OXALATE, URINE (RUSH): NEGATIVE 5 NG/ML
NORHYDROCODONE, URINE (RUSH): >500 50 NG/ML
NOROXYCODONE HCL, URINE (RUSH): NEGATIVE 50 NG/ML
OXAZEPAM, URINE (RUSH): NEGATIVE 25 NG/ML
OXYCODONE UR QL SCN: NEGATIVE 25 NG/ML
OXYMORPHONE, URINE (RUSH): NEGATIVE 25 NG/ML
PH UR STRIP: 5.5 PH UNITS
SP GR UR STRIP: 1.01
TAPENTADOL, URINE (RUSH): NEGATIVE 25 NG/ML
TEMAZEPAM, URINE (RUSH): NEGATIVE 25 NG/ML
THC-COOH, URINE (RUSH): NEGATIVE 25 NG/ML
TRAMADOL, URINE (RUSH): NEGATIVE 100 NG/ML

## 2022-08-23 ENCOUNTER — TELEPHONE (OUTPATIENT)
Dept: PAIN MEDICINE | Facility: CLINIC | Age: 42
End: 2022-08-23
Payer: COMMERCIAL

## 2022-08-23 NOTE — TELEPHONE ENCOUNTER
Pt was scheduled for Bilateral L4-S1 MBB today, insurance has not approved at this time, it is still pending. Pt did present for procedure and was informed it was still in review. Pt had been attempted to be contacted by JAYCO[facility] Roy earlier today but was unsuccessful. Pt was taken off the schedule.

## 2022-08-29 DIAGNOSIS — Z11.59 SCREENING FOR VIRAL DISEASE: Primary | ICD-10-CM

## 2022-08-30 ENCOUNTER — ANESTHESIA EVENT (OUTPATIENT)
Dept: PAIN MEDICINE | Facility: HOSPITAL | Age: 42
End: 2022-08-30
Payer: COMMERCIAL

## 2022-08-30 ENCOUNTER — ANESTHESIA (OUTPATIENT)
Dept: PAIN MEDICINE | Facility: HOSPITAL | Age: 42
End: 2022-08-30
Payer: COMMERCIAL

## 2022-08-30 ENCOUNTER — HOSPITAL ENCOUNTER (OUTPATIENT)
Facility: HOSPITAL | Age: 42
Discharge: HOME | End: 2022-08-30
Attending: PAIN MEDICINE | Admitting: PAIN MEDICINE
Payer: COMMERCIAL

## 2022-08-30 VITALS
RESPIRATION RATE: 17 BRPM | TEMPERATURE: 99 F | BODY MASS INDEX: 24.63 KG/M2 | SYSTOLIC BLOOD PRESSURE: 115 MMHG | DIASTOLIC BLOOD PRESSURE: 79 MMHG | HEIGHT: 63 IN | HEART RATE: 84 BPM | WEIGHT: 139 LBS | OXYGEN SATURATION: 99 %

## 2022-08-30 DIAGNOSIS — M47.817 LUMBOSACRAL SPONDYLOSIS WITHOUT MYELOPATHY: Chronic | ICD-10-CM

## 2022-08-30 DIAGNOSIS — M47.816 SPONDYLOSIS OF LUMBAR REGION WITHOUT MYELOPATHY OR RADICULOPATHY: ICD-10-CM

## 2022-08-30 PROCEDURE — 64494 INJ PARAVERT F JNT L/S 2 LEV: CPT | Mod: 50,,, | Performed by: PAIN MEDICINE

## 2022-08-30 PROCEDURE — 64494 PR INJ DX/THER AGNT PARAVERT FACET JOINT,IMG GUIDE,LUMBAR/SAC, 2ND LEVEL: ICD-10-PCS | Mod: 50,,, | Performed by: PAIN MEDICINE

## 2022-08-30 PROCEDURE — 27000284 HC CANNULA NASAL: Performed by: NURSE ANESTHETIST, CERTIFIED REGISTERED

## 2022-08-30 PROCEDURE — 64495 INJ PARAVERT F JNT L/S 3 LEV: CPT | Mod: 50

## 2022-08-30 PROCEDURE — 64493 INJ PARAVERT F JNT L/S 1 LEV: CPT | Mod: 50 | Performed by: PAIN MEDICINE

## 2022-08-30 PROCEDURE — 37000008 HC ANESTHESIA 1ST 15 MINUTES: Performed by: PAIN MEDICINE

## 2022-08-30 PROCEDURE — D9220A PRA ANESTHESIA: ICD-10-PCS | Mod: ,,, | Performed by: NURSE ANESTHETIST, CERTIFIED REGISTERED

## 2022-08-30 PROCEDURE — 64494 INJ PARAVERT F JNT L/S 2 LEV: CPT | Mod: 50 | Performed by: PAIN MEDICINE

## 2022-08-30 PROCEDURE — 64493 PR INJ DX/THER AGNT PARAVERT FACET JOINT,IMG GUIDE,LUMBAR/SAC,1ST LVL: ICD-10-PCS | Mod: 50,,, | Performed by: PAIN MEDICINE

## 2022-08-30 PROCEDURE — D9220A PRA ANESTHESIA: Mod: ,,, | Performed by: NURSE ANESTHETIST, CERTIFIED REGISTERED

## 2022-08-30 PROCEDURE — 63600175 PHARM REV CODE 636 W HCPCS: Performed by: PAIN MEDICINE

## 2022-08-30 PROCEDURE — 64495 PR INJ DX/THER AGNT PARAVERT FACET JOINT,IMG GUIDE,LUMBAR/SAC, ADD LEVEL: ICD-10-PCS | Mod: 50,,, | Performed by: PAIN MEDICINE

## 2022-08-30 PROCEDURE — 25000003 PHARM REV CODE 250: Performed by: NURSE ANESTHETIST, CERTIFIED REGISTERED

## 2022-08-30 PROCEDURE — 64493 INJ PARAVERT F JNT L/S 1 LEV: CPT | Mod: 50,,, | Performed by: PAIN MEDICINE

## 2022-08-30 PROCEDURE — 63600175 PHARM REV CODE 636 W HCPCS: Performed by: NURSE ANESTHETIST, CERTIFIED REGISTERED

## 2022-08-30 PROCEDURE — 25000003 PHARM REV CODE 250: Performed by: PAIN MEDICINE

## 2022-08-30 PROCEDURE — 64495 INJ PARAVERT F JNT L/S 3 LEV: CPT | Mod: 50,,, | Performed by: PAIN MEDICINE

## 2022-08-30 RX ORDER — BUPIVACAINE HYDROCHLORIDE 2.5 MG/ML
INJECTION, SOLUTION INFILTRATION; PERINEURAL CODE/TRAUMA/SEDATION MEDICATION
Status: DISCONTINUED | OUTPATIENT
Start: 2022-08-30 | End: 2022-08-30 | Stop reason: HOSPADM

## 2022-08-30 RX ORDER — TRIAMCINOLONE ACETONIDE 40 MG/ML
INJECTION, SUSPENSION INTRA-ARTICULAR; INTRAMUSCULAR CODE/TRAUMA/SEDATION MEDICATION
Status: DISCONTINUED | OUTPATIENT
Start: 2022-08-30 | End: 2022-08-30 | Stop reason: HOSPADM

## 2022-08-30 RX ORDER — PROPOFOL 10 MG/ML
VIAL (ML) INTRAVENOUS
Status: DISCONTINUED | OUTPATIENT
Start: 2022-08-30 | End: 2022-08-30

## 2022-08-30 RX ORDER — SODIUM CHLORIDE 9 MG/ML
INJECTION, SOLUTION INTRAVENOUS CONTINUOUS
Status: DISCONTINUED | OUTPATIENT
Start: 2022-08-30 | End: 2022-08-30 | Stop reason: HOSPADM

## 2022-08-30 RX ORDER — LIDOCAINE HYDROCHLORIDE 20 MG/ML
INJECTION INTRAVENOUS
Status: DISCONTINUED | OUTPATIENT
Start: 2022-08-30 | End: 2022-08-30

## 2022-08-30 RX ADMIN — LIDOCAINE HYDROCHLORIDE 100 MG: 20 INJECTION, SOLUTION INTRAVENOUS at 11:08

## 2022-08-30 RX ADMIN — PROPOFOL 100 MG: 10 INJECTION, EMULSION INTRAVENOUS at 11:08

## 2022-08-30 RX ADMIN — PROPOFOL 50 MG: 10 INJECTION, EMULSION INTRAVENOUS at 11:08

## 2022-08-30 NOTE — OP NOTE
Procedure Note    Procedure Date: 8/30/2022    Procedure Performed:  Bilateral Lumbar Facet  Medial Branch Block @ L3-4,4-5,5-S1 with Fluoroscopic Guidance    Indications: Patient has failed conservative therapy.      Pre-op diagnosis: Lumbar Spondylosis    Post-op diagnosis: same    Physician: KAMILLE Gaston MD    Anesthesia: MAC    Estimated Blood Loss: Less than 1cc    IVF: Per Anesthesia    Complications: None    Technique:  The patient was interviewed in the holding area and Risks/Benefits were discussed, including but not limited to  the possibility of new or different pain, bleeding or infection.   All questions were answered.  The patient understood and accepted risks.  Consent was reviewed and signed.  A time out was taken to identify the patient, procedure and side of the procedure. The patient was placed in a prone position, then prepped and draped in the usual sterile fashion using ChloraPrep and sterile towels.  The levels were determined under fluoroscopic guidance and then marked.  1% Lidocaine was given by raising a skin wheal at the skin over each site and then infiltrated.  A 22-gauge 4 inch needle was introduced to the anatomic location of the bilateral L3-4,4-5,5-S1 medial branch nerves.  Then, after negative aspiration, 1 cc of a 1:1 mixture of  (Bupivacaine 0.25% 3cc , 1% lidocaine 2 cc and  40mg kenalog ) was injected @ each level.The patient tolerated the procedure well and was transferred to the [facility] in stable condition.  The patient was monitored after the procedure.  Patient was given post procedure and discharge instructions to follow at home.  The patient was discharged in a stable condition with an adult .

## 2022-08-30 NOTE — H&P
Subjective:      Patient ID: Pili Love is a 41 y.o. female.    Chief Complaint: Back Pain    Pain  This is a chronic problem. The current episode started more than 1 year ago. The problem occurs daily. The problem has been waxing and waning. Associated symptoms include arthralgias, myalgias and neck pain. Pertinent negatives include no change in bowel habit, chills, coughing, fatigue, rash, sore throat, vertigo or vomiting.   Review of Systems   Constitutional:  Negative for activity change, appetite change, chills and fatigue.   HENT:  Negative for drooling, ear discharge, ear pain, facial swelling, nosebleeds, sore throat, trouble swallowing, voice change and goiter.    Eyes:  Negative for photophobia, pain, discharge, redness and visual disturbance.   Respiratory:  Negative for apnea, cough, choking, chest tightness, shortness of breath, wheezing and stridor.    Cardiovascular:  Negative for palpitations and leg swelling.   Gastrointestinal:  Negative for abdominal distention, change in bowel habit, diarrhea, rectal pain, vomiting, fecal incontinence and change in bowel habit.   Endocrine: Negative for cold intolerance, heat intolerance, polydipsia, polyphagia and polyuria.   Genitourinary:  Negative for flank pain, frequency and hot flashes.   Musculoskeletal:  Positive for arthralgias, back pain, myalgias and neck pain.   Integumentary:  Negative for color change, pallor and rash.   Allergic/Immunologic: Negative for immunocompromised state.   Neurological:  Negative for dizziness, vertigo, seizures, syncope, facial asymmetry, speech difficulty, light-headedness, coordination difficulties, memory loss and coordination difficulties.   Hematological:  Negative for adenopathy. Does not bruise/bleed easily.   Psychiatric/Behavioral:  Negative for agitation, behavioral problems, confusion, decreased concentration, dysphoric mood, hallucinations, self-injury and suicidal ideas. The patient is not  "nervous/anxious and is not hyperactive.           Past Surgical History:   Procedure Laterality Date     bilateral cervical paraspinous muscle trigger point injection Bilateral [id number]    D Shows  x 4     SECTION      HYSTERECTOMY      KNEE CARTILAGE SURGERY Right     LUMBAR PUNCTURE  2016    Dr Trejo    Right C3-C7 FI Right 2018    Dr Trejo    TUBAL LIGATION      VAGINAL DELIVERY         Objective:  Vitals:    22 1402   BP: 122/83   Pulse: (!) 112   Resp: 18   SpO2: 99%   Weight: 60.3 kg (133 lb)   Height: 5' 3" (1.6 m)   PainSc:   6         Physical Exam  Vitals and nursing note reviewed. Exam conducted with a chaperone present.   Constitutional:       General: She is awake. She is not in acute distress.     Appearance: Normal appearance. She is not ill-appearing.   HENT:      Head: Normocephalic and atraumatic.      Nose: Nose normal.      Mouth/Throat:      Mouth: Mucous membranes are moist.      Pharynx: Oropharynx is clear.   Eyes:      Conjunctiva/sclera: Conjunctivae normal.      Pupils: Pupils are equal, round, and reactive to light.   Cardiovascular:      Rate and Rhythm: Normal rate.   Pulmonary:      Effort: Pulmonary effort is normal. No respiratory distress.   Abdominal:      Palpations: Abdomen is soft.      Tenderness: There is no guarding.   Musculoskeletal:         General: Normal range of motion.      Cervical back: Normal range of motion and neck supple. Tenderness present. No rigidity.      Thoracic back: Tenderness present.      Lumbar back: Tenderness present.   Skin:     General: Skin is warm and dry.      Coloration: Skin is not jaundiced or pale.   Neurological:      General: No focal deficit present.      Mental Status: She is alert and oriented to person, place, and time. Mental status is at baseline.      Cranial Nerves: No cranial nerve deficit (II-XII).   Psychiatric:         Mood and Affect: Mood normal.         Behavior: Behavior normal. Behavior is cooperative.    "      Thought Content: Thought content normal.         Orders Placed This Encounter   Procedures    COVID-19 Routine Screening     Standing Status:   Future     Standing Expiration Date:   9/25/2023     Order Specific Question:   Is the patient symptomatic?     Answer:   No     Order Specific Question:   Is this needed for pre-procedure or pre-op testing?     Answer:   Yes     Order Specific Question:   Diagnosis:     Answer:   Pre-op testing [015559]    Drug Screen Definitive 14, Urine     Standing Status:   Future     Standing Expiration Date:   9/25/2023     Order Specific Question:   Specimen Source     Answer:   Urine    POCT Urine Drug Screen Presump     Interpretive Information:     Negative:  No drug detected at the cut off level.   Positive:  This result represents presumptive positive for the   tested drug, other substances may yield a positive response other   than the analyte of interest. This result should be utilized for   diagnostic purpose only. Confirmation testing will be performed upon physician request only.       Case Request Operating Room: Block-nerve-medial branch-lumbar, bilateral L4 through S1     Order Specific Question:   Medical Necessity:     Answer:   Medically Non-Urgent [100]     Order Specific Question:   CPT Code:     Answer:   PA INJ DX/THER AGNT PARAVERT FACET JOINT,IMG GUIDE,LUMBAR/SAC,1ST LVL [00856]     Order Specific Question:   CPT Code:     Answer:   PA INJ DX/THER AGNT PARAVERT FACET JOINT,IMG GUIDE,LUMBAR/SAC, 2ND LEVEL [95799]     Order Specific Question:   Is an on-site pathologist required for this procedure?     Answer:   N/A        X-Ray Lumbar Complete Including Flex And Ext  Narrative: EXAMINATION:  XR LUMBAR SPINE 5 VIEW WITH FLEX AND EXT    CLINICAL HISTORY:  Dorsalgia, unspecifiedBack pain or radiculopathy, > 6 wks;    COMPARISON:  Lumbar spine x-ray May 18, 2016    TECHNIQUE:  Frontal and lateral views of the lumbosacral spine. Lateral views obtained in the  neutral, flexion, and extension positions.    FINDINGS:  Vertebral body heights and alignment appear maintained.  Disc spaces appear maintained.  Impression: As above.    Point of Service: [facility]    Electronically signed by: Naren Portillo  Date:    05/24/2021  Time:    15:00       Office Visit on 03/02/2022   Component Date Value Ref Range Status    POC Amphetamines 03/02/2022 Negative  Negative, Inconclusive Final    POC Barbiturates 03/02/2022 Negative  Negative, Inconclusive Final    POC Benzodiazepines 03/02/2022 Negative  Negative, Inconclusive Final    POC Cocaine 03/02/2022 Negative  Negative, Inconclusive Final    POC THC 03/02/2022 Negative  Negative, Inconclusive Final    POC Methadone 03/02/2022 Negative  Negative, Inconclusive Final    POC Methamphetamine 03/02/2022 Negative  Negative, Inconclusive Final    POC Opiates 03/02/2022 Presumptive Positive (A) Negative, Inconclusive Final    POC Oxycodone 03/02/2022 Negative  Negative, Inconclusive Final    POC Phencyclidine 03/02/2022 Negative  Negative, Inconclusive Final    POC Methylenedioxymethamphetamine * 03/02/2022 Negative  Negative, Inconclusive Final    POC Tricyclic Antidepressants 03/02/2022 Negative  Negative, Inconclusive Final    POC Buprenorphine 03/02/2022 Negative   Final     Acceptable 03/02/2022 Yes   Final    POC Temperature (Urine) 03/02/2022 92   Final           Assessment:      1. Lumbosacral spondylosis without myelopathy    2. Cervical radiculopathy    3. Multiple sclerosis    4. Screening for viral disease    5. Encounter for long-term (current) use of other medications            A's of Opioid Risk Assessment  Activity:Patient can perform ADL.   Analgesia:Patients pain is partially controlled by current medication. Patient has tried OTC medications such as Tylenol and Ibuprofen with out relief.   Adverse Effects: Patient denies constipation or sedation.  Aberrant Behavior:  reviewed with no  aberrant drug seeking/taking behavior.  Overdose reversal drug naloxone discussed    Drug screen reviewed      Requested Prescriptions     Signed Prescriptions Disp Refills    amitriptyline (ELAVIL) 25 MG tablet 30 tablet 2     Sig: Take 1 tablet (25 mg total) by mouth nightly.    baclofen (LIORESAL) 10 MG tablet 90 tablet 2     Sig: Take 1 tablet (10 mg total) by mouth 3 (three) times daily.    gabapentin (NEURONTIN) 300 MG capsule 90 capsule 2     Sig: Take 1 capsule (300 mg total) by mouth every 8 (eight) hours.    LIDOcaine (LIDODERM) 5 % 30 patch 2     Sig: Place 1 patch onto the skin once daily. Remove & Discard patch within 12 hours or as directed by MD    tiZANidine 4 mg Cap 60 capsule 2     Sig: Take 1 capsule by mouth every 12 (twelve) hours.    HYDROcodone-acetaminophen (NORCO)  mg per tablet 90 tablet 0     Sig: Take 1 tablet by mouth every 8 (eight) hours.         Plan:    Follows Neurology multiple sclerosis ARM has been referred to [facility]    She states lumbar trigger point injections do help control her discomfort    Complaint of low back pain worse with flexion extension rotation lumbar spine tenderness long facet joint area lower back area facet joint in nature    Chronic neck and joint pain    Requesting lumbar procedure for her back pain    Physical therapy notes reviewed in epic    X-ray lumbar spine [facility] May 2021 multiple level degenerative changes no fracture noted    MRI [facility] lumbar spine degenerative changes multiple levels will place report under media    Continue current medication     Indications for this procedure for this specific patient include the following   - Pt has had symptoms for three months with moderate to severe pain with functional impairment rated of 7/10 pain.   - Pain non-responsive to conservative care.    - Pain predominately axial and not associated with radiculopathy or claudication.    - No non-facet pathology as source of pain.    - Clinical  assessment implicates facet joint as putative pain source.    - Pain is exacerbated by extension or prolonged sitting/standing and relieved by rest.    - No unexplained neurologic deficit.    - No history of coagulopathy, infection or unstable medical conditions.    - Pain is causing significant functional limitation resulting in diminished quality of life and impaired age appropriate ADL's.   - Clinical assessment implicates facet joint as putative source of pain  - Repeat injections not done prior to 7 days   - no more than 2 levels will be done    The planned medically necessary  surgical procedure is performed in a hospital outpatient department and not in an ambulatory surgical center due to:     -there is no geographically assessable ambulatory surgery center that has the  necessary equipment and fluoroscopy needed for the procedure     -there is no geographically assessable ambulatory surgical center available at which the physician has privileges     -an ASC's  specific  guideline regarding the individuals weight or health conditions that prevent the use of an ASC    Monitor anesthesia request is medically indicated for the scheduled nerve block procedure due to:  1- needle phobia and anxiety, placing  the patient at risk during the provided service.  2-patient has an ASA class greater than 3 and requires constant presence of an anesthesiologist during the procedure,   3-patient has severe problems hard to lie still  4-patient suffers from chronic pain and is unable to function due to  diminished ADLs    Schedule bilateral lumbar L4 through S1 medial branch block # 1, lumbosacral spondylosis     Dr. Gaston    Bring original prescription medication bottles/container/box with labels to each visit  Review of Systems   Constitutional:  Negative for activity change, appetite change, chills and fatigue.   HENT:  Negative for drooling, ear discharge, ear pain, facial swelling, nosebleeds, sore throat, trouble  swallowing and voice change.    Eyes:  Negative for photophobia, pain, discharge, redness and visual disturbance.   Respiratory:  Negative for apnea, cough, choking, chest tightness, shortness of breath, wheezing and stridor.    Cardiovascular:  Negative for palpitations and leg swelling.   Gastrointestinal:  Negative for abdominal distention, change in bowel habit, diarrhea, rectal pain and vomiting.   Endocrine: Negative for cold intolerance, heat intolerance, polydipsia, polyphagia and polyuria.   Genitourinary:  Negative for flank pain and frequency.   Musculoskeletal:  Positive for arthralgias, back pain, myalgias and neck pain.   Skin:  Negative for color change, pallor and rash.   Allergic/Immunologic: Negative for immunocompromised state.   Neurological:  Negative for dizziness, vertigo, seizures, syncope, facial asymmetry, speech difficulty, light-headedness and disturbances in coordination.   Hematological:  Negative for adenopathy. Does not bruise/bleed easily.   Psychiatric/Behavioral:  Negative for agitation, behavioral problems, confusion, decreased concentration, dysphoric mood, hallucinations, self-injury and suicidal ideas. The patient is not nervous/anxious and is not hyperactive.        CRANIAL NERVES     CN III, IV, VI   Pupils are equal, round, and reactive to light.

## 2022-08-30 NOTE — ANESTHESIA POSTPROCEDURE EVALUATION
Anesthesia Post Evaluation    Patient: Pili Love    Procedure(s) Performed: Procedure(s) (LRB):  BLOCK, NERVE, FACET JOINT, LUMBAR, MEDIAL BRANCH (Bilateral)    Final Anesthesia Type: general      Patient [facility]: [facility]  Patient participation: Yes- Able to Participate  Level of consciousness: awake and alert  Post-procedure vital signs: reviewed and stable  Pain management: adequate  Airway patency: patent    PONV status at discharge: No PONV  Anesthetic complications: no      Cardiovascular status: blood pressure returned to baseline, hemodynamically stable and stable  Respiratory status: unassisted  Hydration status: euvolemic  Follow-up not needed.  Comments: Pt voices appreciation for care          Vitals Value Taken Time   /64 08/30/22 1154   Temp 37 °C (98.6 °F) 08/30/22 1153   Pulse 91 08/30/22 1154   Resp 18 08/30/22 1153   SpO2 98 % 08/30/22 1154   Vitals shown include unvalidated device data.      No case tracking events are documented in the log.      Pain/Kizzy Score: No data recorded

## 2022-08-30 NOTE — DISCHARGE SUMMARY
Discharge Note  Short Stay    Admit Date: 8/30/2022    Discharge Date: 8/30/2022    Attending Physician: Twyla Gaston     Discharge Provider: Twyla Gaston    Diagnoses:  There are no hospital problems to display for this patient.      Discharged Condition: Good    Final Diagnoses: Spondylosis without myelopathy or radiculopathy, lumbar region [M47.816]    Disposition: Home or Self Care    Hospital Course: No complications, uneventful    Outcome of Hospitalization, Treatment, Procedure, or Surgery:  Patient was admitted for outpatient interventional pain management procedure. The patient tolerated the procedure well with no complications.    Follow up/Patient Instructions:  Follow up as scheduled in Pain Management office in 3-4 weeks.  Patient has received instructions and follow up date and time.    Medications:  Continue previous medications

## 2022-08-30 NOTE — ANESTHESIA PREPROCEDURE EVALUATION
08/30/2022  Pili Love is a 41 y.o., female.      Pre-op Assessment    I have reviewed the Patient Summary Reports.     I have reviewed the Nursing Notes. I have reviewed the NPO Status.   I have reviewed the Medications.     Review of Systems  Pulmonary:   Asthma    Musculoskeletal:   Arthritis     Neurological:   Neuromuscular Disease, Headaches    Psych:   Psychiatric History          Physical Exam  General: Well nourished, Cooperative, Alert and Oriented    Airway:  Mallampati: II   Mouth Opening: Normal  TM Distance: Normal  Tongue: Normal  Neck ROM: Normal ROM        Anesthesia Plan  Type of Anesthesia, risks & benefits discussed:    Anesthesia Type: Gen Natural Airway  Intra-op Monitoring Plan: Standard ASA Monitors  Post Op Pain Control Plan: multimodal analgesia  Induction:  IV  Airway Plan: , Awake  Informed Consent: Informed consent signed with the Patient and all parties understand the risks and agree with anesthesia plan.  All questions answered. Patient consented to blood products? Yes  ASA Score: 3  Day of Surgery Review of History & Physical: H&P Update referred to the surgeon/provider.    Ready For Surgery From Anesthesia Perspective.     .

## 2022-08-30 NOTE — PLAN OF CARE
REFER TO WRITTEN DOCUMENT AND RECOVERY INFORMATION.    D/CD PATIENT VIAA WHEELCHAIR AT 1240.    INFORMED PATIENT IF UNABLE TO VOID IN 8 HOURS, GO TO ER. NOTIFY MD OF REDNESS OR DRAINAGE FROM INJECTION SITE OR FEVER OVER 3-4 DAY. REST AND DRINK PLENTY OF FLUIDS FOR THE REMAINDER OF THE DAY. NO LIFTING OVER 5 LBS FOR THE REMAINDER OF THE DAY. CONTINUE REGULAR MEDICATIONS AS PRESCRIBED. MAY TAKE PAIN MEDICATION AS PRESCRIBED.     PAIN IMPROVED  50%

## 2022-08-30 NOTE — TRANSFER OF CARE
"Anesthesia Transfer of Care Note    Patient: Pili Love    Procedure(s) Performed: Procedure(s) (LRB):  BLOCK, NERVE, FACET JOINT, LUMBAR, MEDIAL BRANCH (Bilateral)    Patient location: Other: [facility]    Anesthesia Type: general    Transport from OR: Transported from OR on room air with adequate spontaneous ventilation    Post pain: adequate analgesia    Post assessment: no apparent anesthetic complications    Post vital signs: stable    Level of consciousness: sedated and responds to stimulation    Nausea/Vomiting: no nausea/vomiting    Complications: none    Transfer of care protocol was followedComments: Good SV continue, NAD noted, VSS, RTRN      Last vitals:   Visit Vitals  /67 (BP Location: Right arm, Patient Position: Lying)   Pulse 90   Temp 37 °C (98.6 °F) (Oral)   Resp 18   Ht 5' 3" (1.6 m)   Wt 63 kg (139 lb)   SpO2 99%   Breastfeeding No   BMI 24.62 kg/m²     "

## 2022-08-30 NOTE — BRIEF OP NOTE
Discharge Note  Short Stay    Admit Date: 8/30/2022    Discharge Date: 8/30/2022    Attending Physician: Twyla Gaston     Discharge Provider: Twyla Gaston    Diagnoses: Lumbosacral spondylosis    Discharged Condition: Good    Final Diagnoses: Spondylosis without myelopathy or radiculopathy, lumbar region [M47.816]    Disposition: Home or Self Care    Hospital Course: No complications, uneventful    Outcome of Hospitalization, Treatment, Procedure, or Surgery:  Patient was admitted for outpatient interventional pain management procedure. The patient tolerated the procedure well with no complications.    Follow up/Patient Instructions:  Follow up as scheduled in Pain Management office in 3-4 weeks.  Patient has received instructions and follow up date and time.    Medications:  Continue previous medications

## 2022-08-30 NOTE — ANESTHESIA RELEASE NOTE
"Anesthesia Release from PACU Note    Patient: Pili Love    Procedure(s) Performed: Procedure(s) (LRB):  BLOCK, NERVE, FACET JOINT, LUMBAR, MEDIAL BRANCH (Bilateral)    Anesthesia type: general    Post pain: Adequate analgesia    Post assessment: no apparent anesthetic complications    Last Vitals:   Visit Vitals  /67 (BP Location: Right arm, Patient Position: Lying)   Pulse 90   Temp 37 °C (98.6 °F) (Oral)   Resp 18   Ht 5' 3" (1.6 m)   Wt 63 kg (139 lb)   SpO2 99%   Breastfeeding No   BMI 24.62 kg/m²       Post vital signs: stable    Level of consciousness: awake    Nausea/Vomiting: no nausea/no vomiting    Complications: none    Airway Patency: patent    Respiratory: unassisted    Cardiovascular: stable and blood pressure at baseline    Hydration: euvolemic  "

## 2022-09-13 ENCOUNTER — OFFICE VISIT (OUTPATIENT)
Dept: PAIN MEDICINE | Facility: CLINIC | Age: 42
End: 2022-09-13
Payer: COMMERCIAL

## 2022-09-13 VITALS
BODY MASS INDEX: 25.34 KG/M2 | DIASTOLIC BLOOD PRESSURE: 81 MMHG | SYSTOLIC BLOOD PRESSURE: 125 MMHG | RESPIRATION RATE: 18 BRPM | HEIGHT: 63 IN | OXYGEN SATURATION: 98 % | WEIGHT: 143 LBS | HEART RATE: 110 BPM

## 2022-09-13 DIAGNOSIS — M54.12 CERVICAL RADICULOPATHY: Chronic | ICD-10-CM

## 2022-09-13 DIAGNOSIS — G35 MULTIPLE SCLEROSIS: Chronic | ICD-10-CM

## 2022-09-13 DIAGNOSIS — M47.817 LUMBOSACRAL SPONDYLOSIS WITHOUT MYELOPATHY: Primary | Chronic | ICD-10-CM

## 2022-09-13 PROCEDURE — 3008F BODY MASS INDEX DOCD: CPT | Mod: CPTII,,, | Performed by: PHYSICIAN ASSISTANT

## 2022-09-13 PROCEDURE — 1159F MED LIST DOCD IN RCRD: CPT | Mod: CPTII,,, | Performed by: PHYSICIAN ASSISTANT

## 2022-09-13 PROCEDURE — 99214 PR OFFICE/OUTPT VISIT, EST, LEVL IV, 30-39 MIN: ICD-10-PCS | Mod: S$PBB,,, | Performed by: PHYSICIAN ASSISTANT

## 2022-09-13 PROCEDURE — 3074F PR MOST RECENT SYSTOLIC BLOOD PRESSURE < 130 MM HG: ICD-10-PCS | Mod: CPTII,,, | Performed by: PHYSICIAN ASSISTANT

## 2022-09-13 PROCEDURE — 1159F PR MEDICATION LIST DOCUMENTED IN MEDICAL RECORD: ICD-10-PCS | Mod: CPTII,,, | Performed by: PHYSICIAN ASSISTANT

## 2022-09-13 PROCEDURE — 99214 OFFICE O/P EST MOD 30 MIN: CPT | Mod: S$PBB,,, | Performed by: PHYSICIAN ASSISTANT

## 2022-09-13 PROCEDURE — 3074F SYST BP LT 130 MM HG: CPT | Mod: CPTII,,, | Performed by: PHYSICIAN ASSISTANT

## 2022-09-13 PROCEDURE — 3079F DIAST BP 80-89 MM HG: CPT | Mod: CPTII,,, | Performed by: PHYSICIAN ASSISTANT

## 2022-09-13 PROCEDURE — 3079F PR MOST RECENT DIASTOLIC BLOOD PRESSURE 80-89 MM HG: ICD-10-PCS | Mod: CPTII,,, | Performed by: PHYSICIAN ASSISTANT

## 2022-09-13 PROCEDURE — 99215 OFFICE O/P EST HI 40 MIN: CPT | Mod: PBBFAC | Performed by: PHYSICIAN ASSISTANT

## 2022-09-13 PROCEDURE — 3008F PR BODY MASS INDEX (BMI) DOCUMENTED: ICD-10-PCS | Mod: CPTII,,, | Performed by: PHYSICIAN ASSISTANT

## 2022-09-13 RX ORDER — TIZANIDINE HYDROCHLORIDE 4 MG/1
1 CAPSULE, GELATIN COATED ORAL EVERY 12 HOURS
Qty: 60 CAPSULE | Refills: 2 | Status: SHIPPED | OUTPATIENT
Start: 2022-09-13 | End: 2022-10-20 | Stop reason: SDUPTHER

## 2022-09-13 RX ORDER — LIDOCAINE 50 MG/G
1 PATCH TOPICAL DAILY
Qty: 30 PATCH | Refills: 2 | Status: SHIPPED | OUTPATIENT
Start: 2022-09-13

## 2022-09-13 RX ORDER — BACLOFEN 10 MG/1
10 TABLET ORAL 3 TIMES DAILY
Qty: 90 TABLET | Refills: 2 | Status: SHIPPED | OUTPATIENT
Start: 2022-09-13 | End: 2022-10-20 | Stop reason: SDUPTHER

## 2022-09-13 RX ORDER — AMITRIPTYLINE HYDROCHLORIDE 25 MG/1
25 TABLET, FILM COATED ORAL NIGHTLY
Qty: 30 TABLET | Refills: 2 | Status: SHIPPED | OUTPATIENT
Start: 2022-09-13 | End: 2022-09-21

## 2022-09-13 RX ORDER — GABAPENTIN 300 MG/1
300 CAPSULE ORAL EVERY 8 HOURS
Qty: 90 CAPSULE | Refills: 2 | Status: SHIPPED | OUTPATIENT
Start: 2022-09-13 | End: 2022-09-21 | Stop reason: SDUPTHER

## 2022-09-13 RX ORDER — HYDROCODONE BITARTRATE AND ACETAMINOPHEN 10; 325 MG/1; MG/1
1 TABLET ORAL EVERY 8 HOURS
Qty: 90 TABLET | Refills: 0 | Status: CANCELLED | OUTPATIENT
Start: 2022-09-13 | End: 2022-10-13

## 2022-09-13 NOTE — PATIENT INSTRUCTIONS
COVID SCREENING TO BE DONE 48-72 HOURS PRIOR TO PROCEDURE IF PT HAS NOT RECEIVED BOTH COVID VACCINATIONS OR HAS BEEN VACCINATED WITHIN THE LAST 2 WEEKS. VACCINATION CARD MUST BE PROVIDED IF PT HAS BEEN VACCINATED OR PT MUST HAVE COVID SCREENING IN ORDER TO HAVE PROCEDURE.  IF YOUR PROCEDURE IS ON A Tuesday, GET COVID TESTING ON THE Friday PRIOR TO PROCEDURE.  IF YOUR PROCEDURE IS ON A Thursday GET COVID TESTING ON THE Monday OR Tuesday PRIOR TO PROCEDURE.    IF YOUR PRIMARY CARE DOCTOR ORDERS YOUR COVID TESTING YOU MUST BRING YOUR RESULTS WITH YOU TO YOUR PROCEDURE.    Procedure Instructions:    Nothing to eat or drink for 8 hours or after midnight including gum, candy, mints, or tobacco products.  If you are scheduled for 1:30 or later nothing to eat or drink after 5 a.m. the morning of the procedure, including gum, candy, mints, or tobacco products.  Must have a  at least 18 yrs of age to stay present at all times  No Diabetic medications the morning of procedure, check blood sugar the morning of procedure, if it is greater than 200 call the office at [phone]  If you are started on antibiotics or have been prescribed antibiotics, have a fever, or have any other type of infection call to reschedule procedure.  If you take blood pressure medications you can take it at your regular scheduled time.        HOLD ASPIRIN AND ASPIRIN PRODUCTS  (ASPIRIN, BC POWDER ETC. ) FOR 7 DAYS  PRIOR TO PROCEDURE  HOLD NSAIDS( ibuprofen, mobic, meloxicam, advil, diclofenac, methotrexate, aleve etc....)  FOR 3 DAYS   PRIOR TO PROCEDURE

## 2022-09-13 NOTE — PROGRESS NOTES
Subjective:         Patient ID: Pili Love is a 41 y.o. female.    Chief Complaint: Back Pain      Pain  This is a chronic problem. The current episode started more than 1 year ago. The problem occurs daily. The problem has been gradually improving. Associated symptoms include arthralgias, myalgias and neck pain. Pertinent negatives include no change in bowel habit, chills, coughing, fatigue, fever, rash, sore throat, vertigo or vomiting.   Review of Systems   Constitutional:  Negative for activity change, appetite change, chills, fatigue and fever.   HENT:  Negative for drooling, ear discharge, ear pain, facial swelling, nosebleeds, sore throat, trouble swallowing, voice change and goiter.    Eyes:  Negative for photophobia, pain, discharge, redness and visual disturbance.   Respiratory:  Negative for apnea, cough, choking, chest tightness, shortness of breath, wheezing and stridor.    Cardiovascular:  Negative for palpitations and leg swelling.   Gastrointestinal:  Negative for abdominal distention, change in bowel habit, diarrhea, rectal pain, vomiting, fecal incontinence and change in bowel habit.   Endocrine: Negative for cold intolerance, heat intolerance, polydipsia, polyphagia and polyuria.   Genitourinary:  Negative for flank pain, frequency and hot flashes.   Musculoskeletal:  Positive for arthralgias, back pain, myalgias and neck pain.   Integumentary:  Negative for color change, pallor and rash.   Allergic/Immunologic: Negative for immunocompromised state.   Neurological:  Negative for dizziness, vertigo, seizures, syncope, facial asymmetry, speech difficulty, light-headedness, coordination difficulties, memory loss and coordination difficulties.   Hematological:  Negative for adenopathy. Does not bruise/bleed easily.   Psychiatric/Behavioral:  Negative for agitation, behavioral problems, confusion, decreased concentration, dysphoric mood, hallucinations, self-injury and suicidal ideas. The  "patient is not nervous/anxious and is not hyperactive.          Past Medical History:   Diagnosis Date    Anxiety     Asthma     Depression     Migraine     Multiple sclerosis     Dr Antonio     Past Surgical History:   Procedure Laterality Date     bilateral cervical paraspinous muscle trigger point injection Bilateral [id number]    D Shows  x 4     SECTION      HYSTERECTOMY      INJECTION OF ANESTHETIC AGENT AROUND MEDIAL BRANCH NERVES INNERVATING LUMBAR FACET JOINT Bilateral 2022    Procedure: BLOCK, NERVE, FACET JOINT, LUMBAR, MEDIAL BRANCH;  Surgeon: Twyla Gaston MD;  Location: [facility];  Service: Pain Management;  Laterality: Bilateral;    KNEE CARTILAGE SURGERY Right     LUMBAR PUNCTURE      Dr Trejo    Right C3-C7 FI Right 2018    Dr Trejo    TUBAL LIGATION      VAGINAL DELIVERY       Social History     Socioeconomic History    Marital status: Single   Tobacco Use    Smoking status: Never    Smokeless tobacco: Never   Substance and Sexual Activity    Alcohol use: Never    Drug use: Never     Family History   Problem Relation Age of Onset    Hypertension Father     Asthma Father     Hypertension Paternal Grandfather     Heart attack Paternal Grandfather      Review of patient's allergies indicates:   Allergen Reactions    Latex, natural rubber         Objective:  Vitals:    22 1348   BP: 125/81   Pulse: 110   Resp: 18   SpO2: 98%   Weight: 64.9 kg (143 lb)   Height: 5' 3" (1.6 m)   PainSc:   6         Physical Exam  Vitals and nursing note reviewed. Exam conducted with a chaperone present.   Constitutional:       General: She is awake. She is not in acute distress.     Appearance: Normal appearance. She is not ill-appearing or diaphoretic.   HENT:      Head: Normocephalic and atraumatic.      Nose: Nose normal.      Mouth/Throat:      Mouth: Mucous membranes are moist.      Pharynx: Oropharynx is clear.   Eyes:      Conjunctiva/sclera: Conjunctivae normal.      Pupils: " Pupils are equal, round, and reactive to light.   Cardiovascular:      Rate and Rhythm: Normal rate.   Pulmonary:      Effort: Pulmonary effort is normal. No respiratory distress.   Abdominal:      Palpations: Abdomen is soft.      Tenderness: There is no guarding.   Musculoskeletal:         General: Normal range of motion.      Cervical back: Normal range of motion and neck supple. Tenderness present. No rigidity.      Thoracic back: Tenderness present.      Lumbar back: Tenderness present.   Skin:     General: Skin is warm and dry.      Coloration: Skin is not jaundiced or pale.   Neurological:      General: No focal deficit present.      Mental Status: She is alert and oriented to person, place, and time. Mental status is at baseline.      Cranial Nerves: No cranial nerve deficit (II-XII).   Psychiatric:         Mood and Affect: Mood normal.         Behavior: Behavior normal. Behavior is cooperative.         Thought Content: Thought content normal.         FL Fluoro for Pain Management  See OP Notes for results.     IMPRESSION: See OP Notes for results.     This procedure was auto-finalized by: Virtual Radiologist       Lab Visit on 08/29/2022   Component Date Value Ref Range Status    COVID-19 Ag 08/29/2022 Negative  Negative, Invalid Final   Lab Visit on 08/19/2022   Component Date Value Ref Range Status    SARS CoV-2 PCR 08/19/2022 Negative  Negative, Invalid Final   Office Visit on 07/27/2022   Component Date Value Ref Range Status    POC Amphetamines 07/27/2022 Negative  Negative, Inconclusive Final    POC Barbiturates 07/27/2022 Negative  Negative, Inconclusive Final    POC Benzodiazepines 07/27/2022 Presumptive Positive (A)  Negative, Inconclusive Final    POC Cocaine 07/27/2022 Negative  Negative, Inconclusive Final    POC THC 07/27/2022 Negative  Negative, Inconclusive Final    POC Methadone 07/27/2022 Negative  Negative, Inconclusive Final    POC Methamphetamine 07/27/2022 Negative  Negative,  Inconclusive Final    POC Opiates 07/27/2022 Presumptive Positive (A)  Negative, Inconclusive Final    POC Oxycodone 07/27/2022 Negative  Negative, Inconclusive Final    POC Phencyclidine 07/27/2022 Negative  Negative, Inconclusive Final    POC Methylenedioxymethamphetamine * 07/27/2022 Negative  Negative, Inconclusive Final    POC Tricyclic Antidepressants 07/27/2022 Negative  Negative, Inconclusive Final    POC Buprenorphine 07/27/2022 Negative   Final     Acceptable 07/27/2022 Yes   Final    POC Temperature (Urine) 07/27/2022 92   Final    pH, UA 07/27/2022 5.5  5.0 to 8.0 pH Units Final    Creatinine, Urine 07/27/2022 110  28 - 219 mg/dL Final    6-Acetylmorphine 07/27/2022 Negative  10 ng/mL Final    7-Aminoclonazepam 07/27/2022 Negative  Negative 25 ng/mL Final    a-Hydroxyalprazolam 07/27/2022 Negative  Negative 25 ng/mL Final    Acetyl Fentanyl 07/27/2022 Negative  2.5 ng/mL Final    Acetyl Norfentanyl Oxalate 07/27/2022 Negative  5 ng/mL Final    Benzoylecgonine 07/27/2022 Negative  100 ng/mL Final    Buprenorphine 07/27/2022 Negative  25 ng/mL Final    Codeine 07/27/2022 Negative  25 ng/mL Final    EDDP 07/27/2022 Negative  25 ng/mL Final    Fentanyl 07/27/2022 Negative  2.5 ng/mL Final    Hydrocodone 07/27/2022 >250.0 (H)  <25.0 25 ng/mL Final    Hydromorphone 07/27/2022 >250.0 (H)  <25.0 25 ng/mL Final    Lorazepam 07/27/2022 Negative  25 ng/mL Final    Morphine 07/27/2022 Negative  25 ng/mL Final    Norbuprenorphine 07/27/2022 Negative  25 ng/mL Final    Nordiazepam 07/27/2022 Negative  25 ng/mL Final    Norfentanyl Oxalate 07/27/2022 Negative  5 ng/mL Final    Norhydrocodone 07/27/2022 >500.0 (H)  <50.0 50 ng/mL Final    Noroxycodone HCL 07/27/2022 Negative  50 ng/mL Final    Oxazepam 07/27/2022 Negative  25 ng/mL Final    Oxymorphone 07/27/2022 Negative  25 ng/mL Final    Tapentadol 07/27/2022 Negative  25 ng/mL Final    Temazepam 07/27/2022 Negative  25 ng/mL Final    THC-COOH  07/27/2022 Negative  25 ng/mL Final    Tramadol 07/27/2022 Negative  100 ng/mL Final    Amphetamine, Urine 07/27/2022 Negative  Negative Final    Methamphetamines, Urine 07/27/2022 Negative  Negative Final    Methadone, Urine 07/27/2022 Negative  Negative 25 ng/mL Final    Oxycodone, Urine 07/27/2022 Negative  Negative 25 ng/mL Final    Specific Gravity, UA 07/27/2022 1.010  <=1.030 Final         No orders of the defined types were placed in this encounter.      Requested Prescriptions     Signed Prescriptions Disp Refills    tiZANidine 4 mg Cap 60 capsule 2     Sig: Take 1 capsule by mouth every 12 (twelve) hours.    gabapentin (NEURONTIN) 300 MG capsule 90 capsule 2     Sig: Take 1 capsule (300 mg total) by mouth every 8 (eight) hours.    baclofen (LIORESAL) 10 MG tablet 90 tablet 2     Sig: Take 1 tablet (10 mg total) by mouth 3 (three) times daily.    amitriptyline (ELAVIL) 25 MG tablet 30 tablet 2     Sig: Take 1 tablet (25 mg total) by mouth nightly.    LIDOcaine (LIDODERM) 5 % 30 patch 2     Sig: Place 1 patch onto the skin once daily. Remove & Discard patch within 12 hours or as directed by MD       Assessment:     1. Lumbosacral spondylosis without myelopathy    2. Cervical radiculopathy    3. Multiple sclerosis         A's of Opioid Risk Assessment  Activity:Patient can perform ADL.   Analgesia:Patients pain is partially controlled by current medication. Patient has tried OTC medications such as Tylenol and Ibuprofen with out relief.   Adverse Effects: Patient denies constipation or sedation.  Aberrant Behavior:  reviewed with no aberrant drug seeking/taking behavior.  Overdose reversal drug naloxone discussed    Drug screen reviewed      Plan:    Follows Neurology multiple sclerosis [facility]     Follow-up after lumbar L4 through S1 bilateral medial branch block # 1, August 30, 2022, she states she would 80% relief after procedure, she states the procedure did help increase her level of function     She  would like to have next lumbar procedure for remaining back pain which is facet joint in nature    Complaint of low back pain worse with flexion extension rotation lumbar spine tenderness long facet joint area lower back area facet joint in nature    Chronic neck and joint pain    Physical therapy notes reviewed in epic    X-ray lumbar spine [facility] May 2021 multiple level degenerative changes no fracture noted    MRI [facility] lumbar spine degenerative changes multiple levels will place report under media    Continue current medication    Continue home exercise program as directed     Indications for this procedure for this specific patient include the following   - Pt has had symptoms for three months with moderate to severe pain with functional impairment rated of 7/10 pain.   - Pain non-responsive to conservative care.    - Pain predominately axial and not associated with radiculopathy or claudication.    - No non-facet pathology as source of pain.    - Clinical assessment implicates facet joint as putative pain source.    - Pain is exacerbated by extension or prolonged sitting/standing and relieved by rest.    - No unexplained neurologic deficit.    - No history of coagulopathy, infection or unstable medical conditions.    - Pain is causing significant functional limitation resulting in diminished quality of life and impaired age appropriate ADL's.   - Clinical assessment implicates facet joint as putative source of pain  - Repeat injections not done prior to 7 days   - no more than 2 levels will be done    The planned medically necessary  surgical procedure is performed in a hospital outpatient department and not in an ambulatory surgical center due to:     -there is no geographically assessable ambulatory surgery center that has the  necessary equipment and fluoroscopy needed for the procedure     -there is no geographically assessable ambulatory surgical center available at which the physician has  privileges     -an ASC's  specific  guideline regarding the individuals weight or health conditions that prevent the use of an ASC    Monitor anesthesia request is medically indicated for the scheduled nerve block procedure due to:  1- needle phobia and anxiety, placing  the patient at risk during the provided service.  2-patient has an ASA class greater than 3 and requires constant presence of an anesthesiologist during the procedure,   3-patient has severe problems hard to lie still  4-patient suffers from chronic pain and is unable to function due to  diminished ADLs    Schedule bilateral lumbar L4 through S1 medial branch block # 2, lumbosacral spondylosis     Dr. Gaston    Bring original prescription medication bottles/container/box with labels to each visit    Pill count    Physical therapy [facility]22    Massage therapy declined

## 2022-09-15 ENCOUNTER — DOCUMENTATION ONLY (OUTPATIENT)
Dept: REHABILITATION | Facility: HOSPITAL | Age: 42
End: 2022-09-15
Payer: COMMERCIAL

## 2022-09-15 PROBLEM — R52 PAIN: Status: RESOLVED | Noted: 2022-03-11 | Resolved: 2022-09-15

## 2022-09-15 PROBLEM — M25.60 DECREASED RANGE OF MOTION: Status: RESOLVED | Noted: 2022-03-11 | Resolved: 2022-09-15

## 2022-09-15 PROBLEM — R53.1 DECREASED STRENGTH: Status: RESOLVED | Noted: 2022-03-11 | Resolved: 2022-09-15

## 2022-09-15 PROBLEM — R20.0 NUMBNESS AND TINGLING: Status: RESOLVED | Noted: 2022-03-11 | Resolved: 2022-09-15

## 2022-09-15 PROBLEM — R20.2 NUMBNESS AND TINGLING: Status: RESOLVED | Noted: 2022-03-11 | Resolved: 2022-09-15

## 2022-09-15 NOTE — PROGRESS NOTES
Outpatient Therapy Discharge Summary     Name: Pili Love  [facility] Number: [id number]    Therapy Diagnosis: No diagnosis found.  Physician: No ref. provider found    Physician Orders: PT Eval and Treat   Medical Diagnosis from Referral: Cervical/lumbar radiculopathy  Evaluation Date: 3/10/2022    Date of Last visit: 6/7/22  Total Visits Received: 10    Assessment    Goals:    All Goals Not Met     Short Term Goals: 4 weeks   1. Patient will be able to complete bed mobility with no difficulty  2. Patient will have manual muscle test of 3+/5 for upper and lower extremities   3. Patient will be able to maintain ~ 5 sec balance each leg without loss of balance   4. Patient will report no numbness in legs/arms     Long Term Goals: 8 weeks   1. Patient will be independent with activities of daily living   2. Patient will have manual muscle test of 4-/5 for upper/lower extremities   3. Patient will be able to sleep through the night without waking from pain  4. Patient will complete activities of daily living with 4/10 pain     Discharge reason: Patient has reached the maximum rehab potential for the present time    Plan   This patient is discharged from Physical Therapy.

## 2022-09-21 ENCOUNTER — OFFICE VISIT (OUTPATIENT)
Dept: NEUROLOGY | Facility: CLINIC | Age: 42
End: 2022-09-21
Payer: COMMERCIAL

## 2022-09-21 VITALS
HEART RATE: 78 BPM | WEIGHT: 144 LBS | OXYGEN SATURATION: 98 % | BODY MASS INDEX: 25.52 KG/M2 | SYSTOLIC BLOOD PRESSURE: 125 MMHG | DIASTOLIC BLOOD PRESSURE: 80 MMHG | HEIGHT: 63 IN

## 2022-09-21 DIAGNOSIS — M47.817 LUMBOSACRAL SPONDYLOSIS WITHOUT MYELOPATHY: Chronic | ICD-10-CM

## 2022-09-21 DIAGNOSIS — M54.12 CERVICAL RADICULOPATHY: Chronic | ICD-10-CM

## 2022-09-21 DIAGNOSIS — G35 MS (MULTIPLE SCLEROSIS): Primary | ICD-10-CM

## 2022-09-21 DIAGNOSIS — G35 MULTIPLE SCLEROSIS: Chronic | ICD-10-CM

## 2022-09-21 PROCEDURE — 3074F SYST BP LT 130 MM HG: CPT | Mod: CPTII,,, | Performed by: PSYCHIATRY & NEUROLOGY

## 2022-09-21 PROCEDURE — 1159F MED LIST DOCD IN RCRD: CPT | Mod: CPTII,,, | Performed by: PSYCHIATRY & NEUROLOGY

## 2022-09-21 PROCEDURE — 3008F PR BODY MASS INDEX (BMI) DOCUMENTED: ICD-10-PCS | Mod: CPTII,,, | Performed by: PSYCHIATRY & NEUROLOGY

## 2022-09-21 PROCEDURE — 3008F BODY MASS INDEX DOCD: CPT | Mod: CPTII,,, | Performed by: PSYCHIATRY & NEUROLOGY

## 2022-09-21 PROCEDURE — 3079F DIAST BP 80-89 MM HG: CPT | Mod: CPTII,,, | Performed by: PSYCHIATRY & NEUROLOGY

## 2022-09-21 PROCEDURE — 3074F PR MOST RECENT SYSTOLIC BLOOD PRESSURE < 130 MM HG: ICD-10-PCS | Mod: CPTII,,, | Performed by: PSYCHIATRY & NEUROLOGY

## 2022-09-21 PROCEDURE — 3079F PR MOST RECENT DIASTOLIC BLOOD PRESSURE 80-89 MM HG: ICD-10-PCS | Mod: CPTII,,, | Performed by: PSYCHIATRY & NEUROLOGY

## 2022-09-21 PROCEDURE — 99214 OFFICE O/P EST MOD 30 MIN: CPT | Mod: PBBFAC | Performed by: PSYCHIATRY & NEUROLOGY

## 2022-09-21 PROCEDURE — 99204 OFFICE O/P NEW MOD 45 MIN: CPT | Mod: S$PBB,,, | Performed by: PSYCHIATRY & NEUROLOGY

## 2022-09-21 PROCEDURE — 99204 PR OFFICE/OUTPT VISIT, NEW, LEVL IV, 45-59 MIN: ICD-10-PCS | Mod: S$PBB,,, | Performed by: PSYCHIATRY & NEUROLOGY

## 2022-09-21 PROCEDURE — 1159F PR MEDICATION LIST DOCUMENTED IN MEDICAL RECORD: ICD-10-PCS | Mod: CPTII,,, | Performed by: PSYCHIATRY & NEUROLOGY

## 2022-09-21 RX ORDER — NORTRIPTYLINE HYDROCHLORIDE 75 MG/1
75 CAPSULE ORAL NIGHTLY
Qty: 90 CAPSULE | Refills: 3 | Status: SHIPPED | OUTPATIENT
Start: 2022-09-21

## 2022-09-21 RX ORDER — GABAPENTIN 300 MG/1
600 CAPSULE ORAL 3 TIMES DAILY
Qty: 270 CAPSULE | Refills: 3 | Status: SHIPPED | OUTPATIENT
Start: 2022-09-21

## 2022-09-21 NOTE — PATIENT INSTRUCTIONS
Needs new MRI brain to r/o MULTIPLE SCLEROSIS  Caution w/ narcotic pain meds   Cont Pamelor   STOP Elavil  Cont Cheng but incr to 600 mg 3x/daily   F/u 3 months

## 2022-09-21 NOTE — PROGRESS NOTES
Subjective:       Patient ID: Pili Love is a 41 y.o. female     Chief Complaint:    Chief Complaint   Patient presents with    Migraine        Allergies:  Latex, natural rubber    Current Medications:    Outpatient Encounter Medications as of 2022   Medication Sig Dispense Refill    amitriptyline (ELAVIL) 25 MG tablet Take 1 tablet (25 mg total) by mouth nightly. 30 tablet 2    baclofen (LIORESAL) 10 MG tablet Take 1 tablet (10 mg total) by mouth 3 (three) times daily. 90 tablet 2    EScitalopram oxalate (LEXAPRO) 20 MG tablet Take 20 mg by mouth once daily.      gabapentin (NEURONTIN) 300 MG capsule Take 1 capsule (300 mg total) by mouth every 8 (eight) hours. 90 capsule 2    LIDOcaine (LIDODERM) 5 % Place 1 patch onto the skin once daily. Remove & Discard patch within 12 hours or as directed by MD Stock patch 2    nortriptyline (PAMELOR) 10 MG capsule Take 50 mg by mouth every evening.      tiZANidine 4 mg Cap Take 1 capsule by mouth every 12 (twelve) hours. 60 capsule 2    [] HYDROcodone-acetaminophen (NORCO)  mg per tablet Take 1 tablet by mouth every 8 (eight) hours. 90 tablet 0    [DISCONTINUED] amitriptyline (ELAVIL) 25 MG tablet Take 1 tablet (25 mg total) by mouth nightly. 30 tablet 2    [DISCONTINUED] azithromycin (ZITHROMAX) 500 MG tablet Take 1 tablet (500 mg total) by mouth once daily. (Patient not taking: No sig reported) 5 tablet 0    [DISCONTINUED] baclofen (LIORESAL) 10 MG tablet Take 1 tablet (10 mg total) by mouth 3 (three) times daily. 90 tablet 2    [DISCONTINUED] buPROPion (WELLBUTRIN) 100 MG tablet Take 100 mg by mouth 2 (two) times daily.      [DISCONTINUED] gabapentin (NEURONTIN) 300 MG capsule Take 1 capsule (300 mg total) by mouth every 8 (eight) hours. 90 capsule 2    [DISCONTINUED] LIDOcaine (LIDODERM) 5 % Place 1 patch onto the skin once daily. Remove & Discard patch within 12 hours or as directed by MD Stock patch 2    [DISCONTINUED] tiZANidine 4 mg Cap Take 1  capsule by mouth every 12 (twelve) hours. 60 capsule 2     No facility-administered encounter medications on file as of 2022.       History of Present Illness  40 yo WF presents w/ prev dx MULTIPLE SCLEROSIS but never on MULTIPLE SCLEROSIS meds - unsure of validity of - per records LP was normal and MRI brain only showed T2 hyperintensities? I have no records for rview?   C/o migraines, depression , anxiety and MSK spinal pains? 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   Many have been followed at [facility] since children by various specialists   Current Pamelor 50 mg qhs is helping HEADACHE's and helping her sleep - pt also on Elavil 10 mg qhs- unsure why added such?   Now on Norco and MANAV's and trigger points and Rehab 3 mos per year for spinal pains but NO surgery ever required  Also on ARGELIA 30 0mg tid w/mod alleviation of paresthesias in hands/feet        Past Medical History:   Diagnosis Date    Anxiety     Asthma     Depression     Migraine     Multiple sclerosis     Dr Antonio       Past Surgical History:   Procedure Laterality Date     bilateral cervical paraspinous muscle trigger point injection Bilateral [id number]    D Shows  x 4     SECTION      HYSTERECTOMY      INJECTION OF ANESTHETIC AGENT AROUND MEDIAL BRANCH NERVES INNERVATING LUMBAR FACET JOINT Bilateral 2022    Procedure: BLOCK, NERVE, FACET JOINT, LUMBAR, MEDIAL BRANCH;  Surgeon: Twyla Gaston MD;  Location: [facility];  Service: Pain Management;  Laterality: Bilateral;    KNEE CARTILAGE SURGERY Right     LUMBAR PUNCTURE      Dr Trejo    Right C3-C7 FI Right 2018    Dr Trejo    TUBAL LIGATION      VAGINAL DELIVERY         Social History  Ms. Love  reports that she has never smoked. She has never used  "smokeless tobacco. She reports that she does not drink alcohol and does not use drugs.    Family History   Ivan family history includes Asthma in her father; Heart attack in her paternal grandfather; Hypertension in her father and paternal grandfather.    Review of Systems  Review of Systems   Musculoskeletal:  Positive for back pain, joint pain and neck pain.   Neurological:  Positive for tingling, sensory change and headaches.   Psychiatric/Behavioral:  Positive for depression. The patient is nervous/anxious and has insomnia.    All other systems reviewed and are negative.   Objective:   /80 (BP Location: Left arm, Patient Position: Sitting, BP Method: Large (Manual))   Pulse 78   Ht 5' 3" (1.6 m)   Wt 65.3 kg (144 lb)   SpO2 98%   BMI 25.51 kg/m²    NEUROLOGICAL EXAMINATION:     MENTAL STATUS   Oriented to person, place, and time.   Attention: normal. Concentration: normal.   Speech: speech is normal   Level of consciousness: alert  Knowledge: good.        Anxiety and depression      CRANIAL NERVES   Cranial nerves II through XII intact.     MOTOR EXAM   Muscle bulk: normal  Overall muscle tone: normal    Strength   Strength 5/5 throughout.     SENSORY EXAM        Paresthesias in distal ext's     GAIT AND COORDINATION     Gait  Gait: normal     Physical Exam  Vitals reviewed.   Neurological:      General: No focal deficit present.      Mental Status: She is alert and oriented to person, place, and time. Mental status is at baseline.      Cranial Nerves: Cranial nerves 2-12 are intact.      Motor: Motor strength is normal.      Gait: Gait is intact.   Psychiatric:         Speech: Speech normal.        Assessment:     There are no diagnoses linked to this encounter.     Primary Diagnosis and ICD10  No primary diagnosis found.    Plan:     There are no Patient Instructions on file for this visit.    Medications Discontinued During This Encounter   Medication Reason    azithromycin (ZITHROMAX) 500 " MG tablet     buPROPion (WELLBUTRIN) 100 MG tablet        Requested Prescriptions      No prescriptions requested or ordered in this encounter

## 2022-09-27 ENCOUNTER — TELEPHONE (OUTPATIENT)
Dept: NEUROLOGY | Facility: CLINIC | Age: 42
End: 2022-09-27
Payer: COMMERCIAL

## 2022-09-27 NOTE — TELEPHONE ENCOUNTER
Attempted call on 9/22, 9/23, 9/26 and 9/27.  Was able to leave  on 9/27 regarding the need to talk to Ms. Piper for answers to questions being asked about her 2 children who are being referred for genetic testing.  The information is needed before the referrals can be sent.

## 2022-10-06 DIAGNOSIS — Z11.59 SCREENING FOR VIRAL DISEASE: Primary | ICD-10-CM

## 2022-10-20 ENCOUNTER — OFFICE VISIT (OUTPATIENT)
Dept: PAIN MEDICINE | Facility: CLINIC | Age: 42
End: 2022-10-20
Payer: COMMERCIAL

## 2022-10-20 VITALS
SYSTOLIC BLOOD PRESSURE: 137 MMHG | BODY MASS INDEX: 25.52 KG/M2 | HEIGHT: 63 IN | HEART RATE: 109 BPM | WEIGHT: 144 LBS | DIASTOLIC BLOOD PRESSURE: 87 MMHG

## 2022-10-20 DIAGNOSIS — M54.12 CERVICAL RADICULOPATHY: Chronic | ICD-10-CM

## 2022-10-20 DIAGNOSIS — M47.817 LUMBOSACRAL SPONDYLOSIS WITHOUT MYELOPATHY: Primary | Chronic | ICD-10-CM

## 2022-10-20 DIAGNOSIS — G35 MULTIPLE SCLEROSIS: Chronic | ICD-10-CM

## 2022-10-20 DIAGNOSIS — Z01.818 PRE-OP TESTING: ICD-10-CM

## 2022-10-20 DIAGNOSIS — Z79.899 ENCOUNTER FOR LONG-TERM (CURRENT) USE OF OTHER MEDICATIONS: ICD-10-CM

## 2022-10-20 PROCEDURE — 1159F MED LIST DOCD IN RCRD: CPT | Mod: CPTII,,, | Performed by: PHYSICIAN ASSISTANT

## 2022-10-20 PROCEDURE — 3079F DIAST BP 80-89 MM HG: CPT | Mod: CPTII,,, | Performed by: PHYSICIAN ASSISTANT

## 2022-10-20 PROCEDURE — 3075F PR MOST RECENT SYSTOLIC BLOOD PRESS GE 130-139MM HG: ICD-10-PCS | Mod: CPTII,,, | Performed by: PHYSICIAN ASSISTANT

## 2022-10-20 PROCEDURE — 36415 COLL VENOUS BLD VENIPUNCTURE: CPT | Mod: ,,, | Performed by: CLINICAL MEDICAL LABORATORY

## 2022-10-20 PROCEDURE — 99214 OFFICE O/P EST MOD 30 MIN: CPT | Mod: S$PBB,,, | Performed by: PHYSICIAN ASSISTANT

## 2022-10-20 PROCEDURE — 1159F PR MEDICATION LIST DOCUMENTED IN MEDICAL RECORD: ICD-10-PCS | Mod: CPTII,,, | Performed by: PHYSICIAN ASSISTANT

## 2022-10-20 PROCEDURE — 36415 PR COLLECTION VENOUS BLOOD,VENIPUNCTURE: ICD-10-PCS | Mod: ,,, | Performed by: CLINICAL MEDICAL LABORATORY

## 2022-10-20 PROCEDURE — 99214 PR OFFICE/OUTPT VISIT, EST, LEVL IV, 30-39 MIN: ICD-10-PCS | Mod: S$PBB,,, | Performed by: PHYSICIAN ASSISTANT

## 2022-10-20 PROCEDURE — G0480 DRUG TEST DEF 1-7 CLASSES: HCPCS

## 2022-10-20 PROCEDURE — 3075F SYST BP GE 130 - 139MM HG: CPT | Mod: CPTII,,, | Performed by: PHYSICIAN ASSISTANT

## 2022-10-20 PROCEDURE — 80307 DRUG TEST PRSMV CHEM ANLYZR: CPT | Performed by: PHYSICIAN ASSISTANT

## 2022-10-20 PROCEDURE — 3079F PR MOST RECENT DIASTOLIC BLOOD PRESSURE 80-89 MM HG: ICD-10-PCS | Mod: CPTII,,, | Performed by: PHYSICIAN ASSISTANT

## 2022-10-20 PROCEDURE — 99215 OFFICE O/P EST HI 40 MIN: CPT | Mod: PBBFAC | Performed by: PHYSICIAN ASSISTANT

## 2022-10-20 RX ORDER — TIZANIDINE HYDROCHLORIDE 4 MG/1
1 CAPSULE, GELATIN COATED ORAL EVERY 12 HOURS
Qty: 60 CAPSULE | Refills: 2 | Status: SHIPPED | OUTPATIENT
Start: 2022-10-20 | End: 2022-11-21 | Stop reason: SDUPTHER

## 2022-10-20 RX ORDER — BACLOFEN 10 MG/1
10 TABLET ORAL 3 TIMES DAILY
Qty: 90 TABLET | Refills: 2 | Status: SHIPPED | OUTPATIENT
Start: 2022-10-20 | End: 2022-11-21 | Stop reason: SDUPTHER

## 2022-10-20 RX ORDER — HYDROCODONE BITARTRATE AND ACETAMINOPHEN 10; 325 MG/1; MG/1
1 TABLET ORAL EVERY 8 HOURS
Qty: 90 TABLET | Refills: 0 | Status: SHIPPED | OUTPATIENT
Start: 2022-10-20 | End: 2022-11-21 | Stop reason: SDUPTHER

## 2022-10-20 NOTE — PROGRESS NOTES
Subjective:         Patient ID: Pili Love is a 42 y.o. female.    Chief Complaint: Neck Pain (Radiates to bilateral arms) and Back Pain (Radiates to bilateral legs)      Pain  This is a chronic problem. The current episode started more than 1 year ago. The problem occurs daily. The problem has been waxing and waning. Associated symptoms include arthralgias, myalgias and neck pain. Pertinent negatives include no anorexia, change in bowel habit, chills, coughing, fever, sore throat, vertigo or vomiting.   Review of Systems   Constitutional:  Negative for activity change, appetite change, chills and fever.   HENT:  Negative for drooling, ear discharge, ear pain, facial swelling, nosebleeds, sore throat, trouble swallowing, voice change and goiter.    Eyes:  Negative for photophobia, pain, discharge, redness and visual disturbance.   Respiratory:  Negative for apnea, cough, choking, chest tightness, shortness of breath, wheezing and stridor.    Cardiovascular:  Negative for palpitations and leg swelling.   Gastrointestinal:  Negative for abdominal distention, anorexia, change in bowel habit, diarrhea, rectal pain, vomiting, fecal incontinence and change in bowel habit.   Endocrine: Negative for cold intolerance, heat intolerance, polydipsia, polyphagia and polyuria.   Genitourinary:  Negative for flank pain, frequency and hot flashes.   Musculoskeletal:  Positive for arthralgias, back pain, myalgias and neck pain.   Integumentary:  Negative for color change and pallor.   Allergic/Immunologic: Negative for immunocompromised state.   Neurological:  Negative for dizziness, vertigo, seizures, syncope, facial asymmetry, speech difficulty, light-headedness, coordination difficulties, memory loss and coordination difficulties.   Hematological:  Negative for adenopathy. Does not bruise/bleed easily.   Psychiatric/Behavioral:  Negative for agitation, behavioral problems, confusion, decreased concentration, dysphoric  "mood, hallucinations, self-injury and suicidal ideas. The patient is not nervous/anxious and is not hyperactive.          Past Medical History:   Diagnosis Date    Anxiety     Asthma     Depression     Migraine     Multiple sclerosis     Dr Antonio     Past Surgical History:   Procedure Laterality Date     bilateral cervical paraspinous muscle trigger point injection Bilateral [id number]    D Shows  x 4     SECTION      HYSTERECTOMY      INJECTION OF ANESTHETIC AGENT AROUND MEDIAL BRANCH NERVES INNERVATING LUMBAR FACET JOINT Bilateral 2022    Procedure: BLOCK, NERVE, FACET JOINT, LUMBAR, MEDIAL BRANCH;  Surgeon: Twyla Gaston MD;  Location: [facility];  Service: Pain Management;  Laterality: Bilateral;    KNEE CARTILAGE SURGERY Right     LUMBAR PUNCTURE  2016    Dr Trejo    Right C3-C7 FI Right 2018    Dr Trejo    TUBAL LIGATION      VAGINAL DELIVERY       Social History     Socioeconomic History    Marital status: Single   Tobacco Use    Smoking status: Never    Smokeless tobacco: Never   Substance and Sexual Activity    Alcohol use: Never    Drug use: Never     Family History   Problem Relation Age of Onset    Hypertension Father     Asthma Father     Hypertension Paternal Grandfather     Heart attack Paternal Grandfather      Review of patient's allergies indicates:   Allergen Reactions    Latex, natural rubber         Objective:  Vitals:    10/20/22 1357   BP: 137/87   Pulse: 109   Weight: 65.3 kg (144 lb)   Height: 5' 3" (1.6 m)   PainSc:   7         Physical Exam  Vitals and nursing note reviewed. Exam conducted with a chaperone present.   Constitutional:       General: She is awake. She is not in acute distress.     Appearance: Normal appearance. She is not ill-appearing or diaphoretic.   HENT:      Head: Normocephalic and atraumatic.      Nose: Nose normal.      Mouth/Throat:      Mouth: Mucous membranes are moist.      Pharynx: Oropharynx is clear.   Eyes:      Conjunctiva/sclera: " Conjunctivae normal.      Pupils: Pupils are equal, round, and reactive to light.   Cardiovascular:      Rate and Rhythm: Normal rate.   Pulmonary:      Effort: Pulmonary effort is normal. No respiratory distress.   Abdominal:      Palpations: Abdomen is soft.      Tenderness: There is no guarding.   Musculoskeletal:         General: Normal range of motion.      Cervical back: Normal range of motion and neck supple. Tenderness present. No rigidity.      Thoracic back: Tenderness present.      Lumbar back: Tenderness present.   Skin:     General: Skin is warm and dry.      Coloration: Skin is not jaundiced or pale.   Neurological:      General: No focal deficit present.      Mental Status: She is alert and oriented to person, place, and time. Mental status is at baseline.      Cranial Nerves: No cranial nerve deficit (II-XII).   Psychiatric:         Mood and Affect: Mood normal.         Behavior: Behavior normal. Behavior is cooperative.         Thought Content: Thought content normal.         MRI Brain W WO Contrast  Narrative: EXAMINATION:  MRI BRAIN W WO CONTRAST    CLINICAL HISTORY:  Headache, chronic, new features or increased frequency;poss MS; Multiple sclerosis    TECHNIQUE:  Axial sagittal and coronal imaging of the brain is performed without and with intravenous contrast. T1, T2, FLAIR and diffusion weighted sequences are performed. Contrast dose is 13 cc Dotarem.    COMPARISON:  22 August 2017    FINDINGS:  No evidence of restricted diffusion seen.  No evidence of intracranial hemorrhage, mass, mass effect or shift is seen.    Foci of white matter T2 signal hyperintensity present in both frontal lobes.  The largest on the right was visible previously.  Remaining parenchyma has normal signal and differentiation.  The ventricles and cisterns are appropriate in caliber.    Posterior fossa, mid brain and pituitary gland appear within normal limits.  No evidence of cranial or skull base abnormality seen.    No  areas of abnormal enhancement are present on the post-contrast images when compared to the pre-contrast study  Impression: Few foci of white matter T2 signal hyperintensity in the frontal lobes could indicate demyelinating lobes disease.  No other significant findings.    Electronically signed by: Rajesh Olmedo  Date:    10/04/2022  Time:    13:14       Lab Visit on 08/29/2022   Component Date Value Ref Range Status    COVID-19 Ag 08/29/2022 Negative  Negative, Invalid Final   Lab Visit on 08/19/2022   Component Date Value Ref Range Status    SARS CoV-2 PCR 08/19/2022 Negative  Negative, Invalid Final   Office Visit on 07/27/2022   Component Date Value Ref Range Status    POC Amphetamines 07/27/2022 Negative  Negative, Inconclusive Final    POC Barbiturates 07/27/2022 Negative  Negative, Inconclusive Final    POC Benzodiazepines 07/27/2022 Presumptive Positive (A)  Negative, Inconclusive Final    POC Cocaine 07/27/2022 Negative  Negative, Inconclusive Final    POC THC 07/27/2022 Negative  Negative, Inconclusive Final    POC Methadone 07/27/2022 Negative  Negative, Inconclusive Final    POC Methamphetamine 07/27/2022 Negative  Negative, Inconclusive Final    POC Opiates 07/27/2022 Presumptive Positive (A)  Negative, Inconclusive Final    POC Oxycodone 07/27/2022 Negative  Negative, Inconclusive Final    POC Phencyclidine 07/27/2022 Negative  Negative, Inconclusive Final    POC Methylenedioxymethamphetamine * 07/27/2022 Negative  Negative, Inconclusive Final    POC Tricyclic Antidepressants 07/27/2022 Negative  Negative, Inconclusive Final    POC Buprenorphine 07/27/2022 Negative   Final     Acceptable 07/27/2022 Yes   Final    POC Temperature (Urine) 07/27/2022 92   Final    pH, UA 07/27/2022 5.5  5.0 to 8.0 pH Units Final    Creatinine, Urine 07/27/2022 110  28 - 219 mg/dL Final    6-Acetylmorphine 07/27/2022 Negative  10 ng/mL Final    7-Aminoclonazepam 07/27/2022 Negative  Negative 25 ng/mL Final     a-Hydroxyalprazolam 07/27/2022 Negative  Negative 25 ng/mL Final    Acetyl Fentanyl 07/27/2022 Negative  2.5 ng/mL Final    Acetyl Norfentanyl Oxalate 07/27/2022 Negative  5 ng/mL Final    Benzoylecgonine 07/27/2022 Negative  100 ng/mL Final    Buprenorphine 07/27/2022 Negative  25 ng/mL Final    Codeine 07/27/2022 Negative  25 ng/mL Final    EDDP 07/27/2022 Negative  25 ng/mL Final    Fentanyl 07/27/2022 Negative  2.5 ng/mL Final    Hydrocodone 07/27/2022 >250.0 (H)  <25.0 25 ng/mL Final    Hydromorphone 07/27/2022 >250.0 (H)  <25.0 25 ng/mL Final    Lorazepam 07/27/2022 Negative  25 ng/mL Final    Morphine 07/27/2022 Negative  25 ng/mL Final    Norbuprenorphine 07/27/2022 Negative  25 ng/mL Final    Nordiazepam 07/27/2022 Negative  25 ng/mL Final    Norfentanyl Oxalate 07/27/2022 Negative  5 ng/mL Final    Norhydrocodone 07/27/2022 >500.0 (H)  <50.0 50 ng/mL Final    Noroxycodone HCL 07/27/2022 Negative  50 ng/mL Final    Oxazepam 07/27/2022 Negative  25 ng/mL Final    Oxymorphone 07/27/2022 Negative  25 ng/mL Final    Tapentadol 07/27/2022 Negative  25 ng/mL Final    Temazepam 07/27/2022 Negative  25 ng/mL Final    THC-COOH 07/27/2022 Negative  25 ng/mL Final    Tramadol 07/27/2022 Negative  100 ng/mL Final    Amphetamine, Urine 07/27/2022 Negative  Negative Final    Methamphetamines, Urine 07/27/2022 Negative  Negative Final    Methadone, Urine 07/27/2022 Negative  Negative 25 ng/mL Final    Oxycodone, Urine 07/27/2022 Negative  Negative 25 ng/mL Final    Specific Gravity, UA 07/27/2022 1.010  <=1.030 Final         Orders Placed This Encounter   Procedures    Drugs of Abuse Screen, Blood     [facility] blood drug screen pain treatment     Standing Status:   Future     Number of Occurrences:   1     Standing Expiration Date:   1/20/2023    Case Request Operating Room: Block-nerve-medial branch-lumbar, bilateral L4 through S1     Order Specific Question:   Medical Necessity:     Answer:   Medically Non-Urgent  [100]     Order Specific Question:   CPT Code:     Answer:   HI INJ DX/THER AGNT PARAVERT FACET JOINT,IMG GUIDE,LUMBAR/SAC,1ST LVL [72674]     Order Specific Question:   CPT Code:     Answer:   HI INJ DX/THER AGNT PARAVERT FACET JOINT,IMG GUIDE,LUMBAR/SAC, 2ND LEVEL [06783]     Order Specific Question:   Is an on-site pathologist required for this procedure?     Answer:   N/A         Requested Prescriptions     Signed Prescriptions Disp Refills    tiZANidine 4 mg Cap 60 capsule 2     Sig: Take 1 capsule by mouth every 12 (twelve) hours.    baclofen (LIORESAL) 10 MG tablet 90 tablet 2     Sig: Take 1 tablet (10 mg total) by mouth 3 (three) times daily.    HYDROcodone-acetaminophen (NORCO)  mg per tablet 90 tablet 0     Sig: Take 1 tablet by mouth every 8 (eight) hours.       Assessment:     1. Lumbosacral spondylosis without myelopathy    2. Cervical radiculopathy    3. Multiple sclerosis    4. Encounter for long-term (current) use of other medications         A's of Opioid Risk Assessment  Activity:Patient can perform ADL.   Analgesia:Patients pain is partially controlled by current medication. Patient has tried OTC medications such as Tylenol and Ibuprofen with out relief.   Adverse Effects: Patient denies constipation or sedation.  Aberrant Behavior:  reviewed with no aberrant drug seeking/taking behavior.  Overdose reversal drug naloxone discussed    Drug screen reviewed      Plan:    [facility] drug screen today    Follows Neurology multiple sclerosis [facility]     Requesting to reschedule lumbar medial branch block bilateral had to postpone    She had lumbar L4 through S1 bilateral medial branch block # 1, August 30, 2022, she states she would 80% relief after procedure, she states the procedure did help increase her level of function     Complaint of low back pain worse with flexion extension rotation lumbar spine tenderness long facet joint area lower back area facet joint in nature    Chronic neck and joint  pain    Physical therapy notes reviewed in epic    X-ray lumbar spine [facility] May 2021 multiple level degenerative changes no fracture noted    MRI [facility] lumbar spine degenerative changes multiple levels will place report under media    Continue current medication    Continue home exercise program as directed     Indications for this procedure for this specific patient include the following   - Pt has had symptoms for three months with moderate to severe pain with functional impairment rated of 7/10 pain.   - Pain non-responsive to conservative care.    - Pain predominately axial and not associated with radiculopathy or claudication.    - No non-facet pathology as source of pain.    - Clinical assessment implicates facet joint as putative pain source.    - Pain is exacerbated by extension or prolonged sitting/standing and relieved by rest.    - No unexplained neurologic deficit.    - No history of coagulopathy, infection or unstable medical conditions.    - Pain is causing significant functional limitation resulting in diminished quality of life and impaired age appropriate ADL's.   - Clinical assessment implicates facet joint as putative source of pain  - Repeat injections not done prior to 7 days   - no more than 2 levels will be done    The planned medically necessary  surgical procedure is performed in a hospital outpatient department and not in an ambulatory surgical center due to:     -there is no geographically assessable ambulatory surgery center that has the  necessary equipment and fluoroscopy needed for the procedure     -there is no geographically assessable ambulatory surgical center available at which the physician has privileges     -an ASC's  specific  guideline regarding the individuals weight or health conditions that prevent the use of an ASC    Monitor anesthesia request is medically indicated for the scheduled nerve block procedure due to:  1- needle phobia and anxiety, placing  the patient  at risk during the provided service.  2-patient has an ASA class greater than 3 and requires constant presence of an anesthesiologist during the procedure,   3-patient has severe problems hard to lie still  4-patient suffers from chronic pain and is unable to function due to  diminished ADLs    Schedule bilateral lumbar L4 through S1 medial branch block # 2, lumbosacral spondylosis     Dr. Gaston    Bring original prescription medication bottles/container/box with labels to each visit    Pill count    Physical therapy [facility]    Massage therapy declined

## 2022-10-20 NOTE — H&P (VIEW-ONLY)
Subjective:         Patient ID: Pili Love is a 42 y.o. female.    Chief Complaint: Neck Pain (Radiates to bilateral arms) and Back Pain (Radiates to bilateral legs)      Pain  This is a chronic problem. The current episode started more than 1 year ago. The problem occurs daily. The problem has been waxing and waning. Associated symptoms include arthralgias, myalgias and neck pain. Pertinent negatives include no anorexia, change in bowel habit, chills, coughing, fever, sore throat, vertigo or vomiting.   Review of Systems   Constitutional:  Negative for activity change, appetite change, chills and fever.   HENT:  Negative for drooling, ear discharge, ear pain, facial swelling, nosebleeds, sore throat, trouble swallowing, voice change and goiter.    Eyes:  Negative for photophobia, pain, discharge, redness and visual disturbance.   Respiratory:  Negative for apnea, cough, choking, chest tightness, shortness of breath, wheezing and stridor.    Cardiovascular:  Negative for palpitations and leg swelling.   Gastrointestinal:  Negative for abdominal distention, anorexia, change in bowel habit, diarrhea, rectal pain, vomiting, fecal incontinence and change in bowel habit.   Endocrine: Negative for cold intolerance, heat intolerance, polydipsia, polyphagia and polyuria.   Genitourinary:  Negative for flank pain, frequency and hot flashes.   Musculoskeletal:  Positive for arthralgias, back pain, myalgias and neck pain.   Integumentary:  Negative for color change and pallor.   Allergic/Immunologic: Negative for immunocompromised state.   Neurological:  Negative for dizziness, vertigo, seizures, syncope, facial asymmetry, speech difficulty, light-headedness, coordination difficulties, memory loss and coordination difficulties.   Hematological:  Negative for adenopathy. Does not bruise/bleed easily.   Psychiatric/Behavioral:  Negative for agitation, behavioral problems, confusion, decreased concentration, dysphoric  "mood, hallucinations, self-injury and suicidal ideas. The patient is not nervous/anxious and is not hyperactive.          Past Medical History:   Diagnosis Date    Anxiety     Asthma     Depression     Migraine     Multiple sclerosis     Dr Antonio     Past Surgical History:   Procedure Laterality Date     bilateral cervical paraspinous muscle trigger point injection Bilateral [id number]    D Shows  x 4     SECTION      HYSTERECTOMY      INJECTION OF ANESTHETIC AGENT AROUND MEDIAL BRANCH NERVES INNERVATING LUMBAR FACET JOINT Bilateral 2022    Procedure: BLOCK, NERVE, FACET JOINT, LUMBAR, MEDIAL BRANCH;  Surgeon: Twyla Gaston MD;  Location: [facility];  Service: Pain Management;  Laterality: Bilateral;    KNEE CARTILAGE SURGERY Right     LUMBAR PUNCTURE  2016    Dr Trejo    Right C3-C7 FI Right 2018    Dr Trejo    TUBAL LIGATION      VAGINAL DELIVERY       Social History     Socioeconomic History    Marital status: Single   Tobacco Use    Smoking status: Never    Smokeless tobacco: Never   Substance and Sexual Activity    Alcohol use: Never    Drug use: Never     Family History   Problem Relation Age of Onset    Hypertension Father     Asthma Father     Hypertension Paternal Grandfather     Heart attack Paternal Grandfather      Review of patient's allergies indicates:   Allergen Reactions    Latex, natural rubber         Objective:  Vitals:    10/20/22 1357   BP: 137/87   Pulse: 109   Weight: 65.3 kg (144 lb)   Height: 5' 3" (1.6 m)   PainSc:   7         Physical Exam  Vitals and nursing note reviewed. Exam conducted with a chaperone present.   Constitutional:       General: She is awake. She is not in acute distress.     Appearance: Normal appearance. She is not ill-appearing or diaphoretic.   HENT:      Head: Normocephalic and atraumatic.      Nose: Nose normal.      Mouth/Throat:      Mouth: Mucous membranes are moist.      Pharynx: Oropharynx is clear.   Eyes:      Conjunctiva/sclera: " Conjunctivae normal.      Pupils: Pupils are equal, round, and reactive to light.   Cardiovascular:      Rate and Rhythm: Normal rate.   Pulmonary:      Effort: Pulmonary effort is normal. No respiratory distress.   Abdominal:      Palpations: Abdomen is soft.      Tenderness: There is no guarding.   Musculoskeletal:         General: Normal range of motion.      Cervical back: Normal range of motion and neck supple. Tenderness present. No rigidity.      Thoracic back: Tenderness present.      Lumbar back: Tenderness present.   Skin:     General: Skin is warm and dry.      Coloration: Skin is not jaundiced or pale.   Neurological:      General: No focal deficit present.      Mental Status: She is alert and oriented to person, place, and time. Mental status is at baseline.      Cranial Nerves: No cranial nerve deficit (II-XII).   Psychiatric:         Mood and Affect: Mood normal.         Behavior: Behavior normal. Behavior is cooperative.         Thought Content: Thought content normal.         MRI Brain W WO Contrast  Narrative: EXAMINATION:  MRI BRAIN W WO CONTRAST    CLINICAL HISTORY:  Headache, chronic, new features or increased frequency;poss MS; Multiple sclerosis    TECHNIQUE:  Axial sagittal and coronal imaging of the brain is performed without and with intravenous contrast. T1, T2, FLAIR and diffusion weighted sequences are performed. Contrast dose is 13 cc Dotarem.    COMPARISON:  22 August 2017    FINDINGS:  No evidence of restricted diffusion seen.  No evidence of intracranial hemorrhage, mass, mass effect or shift is seen.    Foci of white matter T2 signal hyperintensity present in both frontal lobes.  The largest on the right was visible previously.  Remaining parenchyma has normal signal and differentiation.  The ventricles and cisterns are appropriate in caliber.    Posterior fossa, mid brain and pituitary gland appear within normal limits.  No evidence of cranial or skull base abnormality seen.    No  areas of abnormal enhancement are present on the post-contrast images when compared to the pre-contrast study  Impression: Few foci of white matter T2 signal hyperintensity in the frontal lobes could indicate demyelinating lobes disease.  No other significant findings.    Electronically signed by: Rajesh Olmedo  Date:    10/04/2022  Time:    13:14       Lab Visit on 08/29/2022   Component Date Value Ref Range Status    COVID-19 Ag 08/29/2022 Negative  Negative, Invalid Final   Lab Visit on 08/19/2022   Component Date Value Ref Range Status    SARS CoV-2 PCR 08/19/2022 Negative  Negative, Invalid Final   Office Visit on 07/27/2022   Component Date Value Ref Range Status    POC Amphetamines 07/27/2022 Negative  Negative, Inconclusive Final    POC Barbiturates 07/27/2022 Negative  Negative, Inconclusive Final    POC Benzodiazepines 07/27/2022 Presumptive Positive (A)  Negative, Inconclusive Final    POC Cocaine 07/27/2022 Negative  Negative, Inconclusive Final    POC THC 07/27/2022 Negative  Negative, Inconclusive Final    POC Methadone 07/27/2022 Negative  Negative, Inconclusive Final    POC Methamphetamine 07/27/2022 Negative  Negative, Inconclusive Final    POC Opiates 07/27/2022 Presumptive Positive (A)  Negative, Inconclusive Final    POC Oxycodone 07/27/2022 Negative  Negative, Inconclusive Final    POC Phencyclidine 07/27/2022 Negative  Negative, Inconclusive Final    POC Methylenedioxymethamphetamine * 07/27/2022 Negative  Negative, Inconclusive Final    POC Tricyclic Antidepressants 07/27/2022 Negative  Negative, Inconclusive Final    POC Buprenorphine 07/27/2022 Negative   Final     Acceptable 07/27/2022 Yes   Final    POC Temperature (Urine) 07/27/2022 92   Final    pH, UA 07/27/2022 5.5  5.0 to 8.0 pH Units Final    Creatinine, Urine 07/27/2022 110  28 - 219 mg/dL Final    6-Acetylmorphine 07/27/2022 Negative  10 ng/mL Final    7-Aminoclonazepam 07/27/2022 Negative  Negative 25 ng/mL Final     a-Hydroxyalprazolam 07/27/2022 Negative  Negative 25 ng/mL Final    Acetyl Fentanyl 07/27/2022 Negative  2.5 ng/mL Final    Acetyl Norfentanyl Oxalate 07/27/2022 Negative  5 ng/mL Final    Benzoylecgonine 07/27/2022 Negative  100 ng/mL Final    Buprenorphine 07/27/2022 Negative  25 ng/mL Final    Codeine 07/27/2022 Negative  25 ng/mL Final    EDDP 07/27/2022 Negative  25 ng/mL Final    Fentanyl 07/27/2022 Negative  2.5 ng/mL Final    Hydrocodone 07/27/2022 >250.0 (H)  <25.0 25 ng/mL Final    Hydromorphone 07/27/2022 >250.0 (H)  <25.0 25 ng/mL Final    Lorazepam 07/27/2022 Negative  25 ng/mL Final    Morphine 07/27/2022 Negative  25 ng/mL Final    Norbuprenorphine 07/27/2022 Negative  25 ng/mL Final    Nordiazepam 07/27/2022 Negative  25 ng/mL Final    Norfentanyl Oxalate 07/27/2022 Negative  5 ng/mL Final    Norhydrocodone 07/27/2022 >500.0 (H)  <50.0 50 ng/mL Final    Noroxycodone HCL 07/27/2022 Negative  50 ng/mL Final    Oxazepam 07/27/2022 Negative  25 ng/mL Final    Oxymorphone 07/27/2022 Negative  25 ng/mL Final    Tapentadol 07/27/2022 Negative  25 ng/mL Final    Temazepam 07/27/2022 Negative  25 ng/mL Final    THC-COOH 07/27/2022 Negative  25 ng/mL Final    Tramadol 07/27/2022 Negative  100 ng/mL Final    Amphetamine, Urine 07/27/2022 Negative  Negative Final    Methamphetamines, Urine 07/27/2022 Negative  Negative Final    Methadone, Urine 07/27/2022 Negative  Negative 25 ng/mL Final    Oxycodone, Urine 07/27/2022 Negative  Negative 25 ng/mL Final    Specific Gravity, UA 07/27/2022 1.010  <=1.030 Final         Orders Placed This Encounter   Procedures    Drugs of Abuse Screen, Blood     [place] blood drug screen pain treatment     Standing Status:   Future     Number of Occurrences:   1     Standing Expiration Date:   1/20/2023    Case Request Operating Room: Block-nerve-medial branch-lumbar, bilateral L4 through S1     Order Specific Question:   Medical Necessity:     Answer:   Medically Non-Urgent  [100]     Order Specific Question:   CPT Code:     Answer:   [place] INJ DX/THER AGNT PARAVERT FACET JOINT,IMG GUIDE,LUMBAR/SAC,1ST LVL [30334]     Order Specific Question:   CPT Code:     Answer:   [place] INJ DX/THER AGNT PARAVERT FACET JOINT,IMG GUIDE,LUMBAR/SAC, 2ND LEVEL [83528]     Order Specific Question:   Is an on-site pathologist required for this procedure?     Answer:   N/A         Requested Prescriptions     Signed Prescriptions Disp Refills    tiZANidine 4 mg Cap 60 capsule 2     Sig: Take 1 capsule by mouth every 12 (twelve) hours.    baclofen (LIORESAL) 10 MG tablet 90 tablet 2     Sig: Take 1 tablet (10 mg total) by mouth 3 (three) times daily.    HYDROcodone-acetaminophen (NORCO)  mg per tablet 90 tablet 0     Sig: Take 1 tablet by mouth every 8 (eight) hours.       Assessment:     1. Lumbosacral spondylosis without myelopathy    2. Cervical radiculopathy    3. Multiple sclerosis    4. Encounter for long-term (current) use of other medications         A's of Opioid Risk Assessment  Activity:Patient can perform ADL.   Analgesia:Patients pain is partially controlled by current medication. Patient has tried OTC medications such as Tylenol and Ibuprofen with out relief.   Adverse Effects: Patient denies constipation or sedation.  Aberrant Behavior:  reviewed with no aberrant drug seeking/taking behavior.  Overdose reversal drug naloxone discussed    Drug screen reviewed      Plan:    [place] drug screen today    Follows Neurology multiple sclerosis [facility]     Requesting to reschedule lumbar medial branch block bilateral had to postpone    She had lumbar L4 through S1 bilateral medial branch block # 1, August 30, 2022, she states she would 80% relief after procedure, she states the procedure did help increase her level of function     Complaint of low back pain worse with flexion extension rotation lumbar spine tenderness long facet joint area lower back area facet joint in nature    Chronic neck and joint  pain    Physical therapy notes reviewed in epic    X-ray lumbar spine [facility] May 2021 multiple level degenerative changes no fracture noted    MRI [facility] lumbar spine degenerative changes multiple levels will place report under media    Continue current medication    Continue home exercise program as directed     Indications for this procedure for this specific patient include the following   - Pt has had symptoms for three months with moderate to severe pain with functional impairment rated of 7/10 pain.   - Pain non-responsive to conservative care.    - Pain predominately axial and not associated with radiculopathy or claudication.    - No non-facet pathology as source of pain.    - Clinical assessment implicates facet joint as putative pain source.    - Pain is exacerbated by extension or prolonged sitting/standing and relieved by rest.    - No unexplained neurologic deficit.    - No history of coagulopathy, infection or unstable medical conditions.    - Pain is causing significant functional limitation resulting in diminished quality of life and impaired age appropriate ADL's.   - Clinical assessment implicates facet joint as putative source of pain  - Repeat injections not done prior to 7 days   - no more than 2 levels will be done    The planned medically necessary  surgical procedure is performed in a hospital outpatient department and not in an ambulatory surgical center due to:     -there is no geographically assessable ambulatory surgery center that has the  necessary equipment and fluoroscopy needed for the procedure     -there is no geographically assessable ambulatory surgical center available at which the physician has privileges     -an ASC's  specific  guideline regarding the individuals weight or health conditions that prevent the use of an ASC    Monitor anesthesia request is medically indicated for the scheduled nerve block procedure due to:  1- needle phobia and anxiety, placing  the patient  at risk during the provided service.  2-patient has an ASA class greater than 3 and requires constant presence of an anesthesiologist during the procedure,   3-patient has severe problems hard to lie still  4-patient suffers from chronic pain and is unable to function due to  diminished ADLs    Schedule bilateral lumbar L4 through S1 medial branch block # 2, lumbosacral spondylosis     Dr. Gaston    Bring original prescription medication bottles/container/box with labels to each visit    Pill count    Physical therapy [facility]22    Massage therapy declined

## 2022-10-20 NOTE — PATIENT INSTRUCTIONS
COVID SCREENING TO BE DONE 48-72 HOURS PRIOR TO PROCEDURE IF PT HAS NOT RECEIVED BOTH COVID VACCINATIONS OR HAS BEEN VACCINATED WITHIN THE LAST 2 WEEKS. VACCINATION CARD MUST BE PROVIDED IF PT HAS BEEN VACCINATED OR PT MUST HAVE COVID SCREENING IN ORDER TO HAVE PROCEDURE.  IF YOUR PROCEDURE IS ON A Tuesday, GET COVID TESTING ON THE Friday PRIOR TO PROCEDURE.  IF YOUR PROCEDURE IS ON A Thursday GET COVID TESTING ON THE Monday OR Tuesday PRIOR TO PROCEDURE.    IF YOUR PRIMARY CARE DOCTOR ORDERS YOUR COVID TESTING YOU MUST BRING YOUR RESULTS WITH YOU TO YOUR PROCEDURE.    Procedure Instructions:    Nothing to eat or drink for 8 hours or after midnight including gum, candy, mints, or tobacco products.  If you are scheduled for 1:30 or later nothing to eat or drink after 5 a.m. the morning of the procedure, including gum, candy, mints, or tobacco products.  Must have a  at least 18 yrs of age to stay present at all times  No Diabetic medications the morning of procedure, check blood sugar the morning of procedure, if it is greater than 200 call the office at [phone]  If you are started on antibiotics or have been prescribed antibiotics, have a fever, or have any other type of infection call to reschedule procedure.  If you take blood pressure medications you can take it at your regular scheduled time.        HOLD ASPIRIN AND ASPIRIN PRODUCTS  (ASPIRIN, BC POWDER ETC. ) FOR 7 DAYS  PRIOR TO PROCEDURE  HOLD NSAIDS( ibuprofen, mobic, meloxicam, advil, diclofenac, methotrexate, aleve etc....)  FOR 3 DAYS   PRIOR TO PROCEDURE

## 2022-10-26 LAB — BEAKER SEE SCANNED REPORT: NORMAL

## 2022-11-10 ENCOUNTER — ANESTHESIA EVENT (OUTPATIENT)
Dept: PAIN MEDICINE | Facility: HOSPITAL | Age: 42
End: 2022-11-10
Payer: COMMERCIAL

## 2022-11-10 ENCOUNTER — HOSPITAL ENCOUNTER (OUTPATIENT)
Facility: HOSPITAL | Age: 42
Discharge: HOME | End: 2022-11-10
Attending: PAIN MEDICINE | Admitting: PAIN MEDICINE
Payer: COMMERCIAL

## 2022-11-10 ENCOUNTER — ANESTHESIA (OUTPATIENT)
Dept: PAIN MEDICINE | Facility: HOSPITAL | Age: 42
End: 2022-11-10
Payer: COMMERCIAL

## 2022-11-10 VITALS
BODY MASS INDEX: 24.45 KG/M2 | HEIGHT: 63 IN | DIASTOLIC BLOOD PRESSURE: 80 MMHG | RESPIRATION RATE: 18 BRPM | TEMPERATURE: 98 F | OXYGEN SATURATION: 97 % | HEART RATE: 79 BPM | WEIGHT: 138 LBS | SYSTOLIC BLOOD PRESSURE: 119 MMHG

## 2022-11-10 DIAGNOSIS — M47.817 LUMBOSACRAL SPONDYLOSIS WITHOUT MYELOPATHY: Primary | Chronic | ICD-10-CM

## 2022-11-10 DIAGNOSIS — M47.817 SPONDYLOSIS OF LUMBOSACRAL REGION WITHOUT MYELOPATHY OR RADICULOPATHY: ICD-10-CM

## 2022-11-10 PROCEDURE — D9220A PRA ANESTHESIA: ICD-10-PCS | Mod: ,,, | Performed by: NURSE ANESTHETIST, CERTIFIED REGISTERED

## 2022-11-10 PROCEDURE — 64493 INJ PARAVERT F JNT L/S 1 LEV: CPT | Mod: LT | Performed by: PAIN MEDICINE

## 2022-11-10 PROCEDURE — D9220A PRA ANESTHESIA: Mod: ,,, | Performed by: NURSE ANESTHETIST, CERTIFIED REGISTERED

## 2022-11-10 PROCEDURE — 37000009 HC ANESTHESIA EA ADD 15 MINS: Performed by: PAIN MEDICINE

## 2022-11-10 PROCEDURE — 64494 PR INJ DX/THER AGNT PARAVERT FACET JOINT,IMG GUIDE,LUMBAR/SAC, 2ND LEVEL: ICD-10-PCS | Mod: 50,,, | Performed by: PAIN MEDICINE

## 2022-11-10 PROCEDURE — 64493 PR INJ DX/THER AGNT PARAVERT FACET JOINT,IMG GUIDE,LUMBAR/SAC,1ST LVL: ICD-10-PCS | Mod: 50,,, | Performed by: PAIN MEDICINE

## 2022-11-10 PROCEDURE — 25000003 PHARM REV CODE 250: Performed by: NURSE ANESTHETIST, CERTIFIED REGISTERED

## 2022-11-10 PROCEDURE — 25000003 PHARM REV CODE 250: Performed by: PAIN MEDICINE

## 2022-11-10 PROCEDURE — 64494 INJ PARAVERT F JNT L/S 2 LEV: CPT | Mod: 50,,, | Performed by: PAIN MEDICINE

## 2022-11-10 PROCEDURE — 64494 INJ PARAVERT F JNT L/S 2 LEV: CPT | Mod: LT | Performed by: PAIN MEDICINE

## 2022-11-10 PROCEDURE — 64493 INJ PARAVERT F JNT L/S 1 LEV: CPT | Mod: 50,,, | Performed by: PAIN MEDICINE

## 2022-11-10 PROCEDURE — 63600175 PHARM REV CODE 636 W HCPCS: Performed by: NURSE ANESTHETIST, CERTIFIED REGISTERED

## 2022-11-10 PROCEDURE — 63600175 PHARM REV CODE 636 W HCPCS: Performed by: PAIN MEDICINE

## 2022-11-10 PROCEDURE — 37000008 HC ANESTHESIA 1ST 15 MINUTES: Performed by: PAIN MEDICINE

## 2022-11-10 RX ORDER — SODIUM CHLORIDE 9 MG/ML
INJECTION, SOLUTION INTRAVENOUS CONTINUOUS
Status: DISCONTINUED | OUTPATIENT
Start: 2022-11-10 | End: 2022-11-10 | Stop reason: HOSPADM

## 2022-11-10 RX ORDER — PROPOFOL 10 MG/ML
VIAL (ML) INTRAVENOUS
Status: DISCONTINUED | OUTPATIENT
Start: 2022-11-10 | End: 2022-11-10

## 2022-11-10 RX ORDER — LIDOCAINE HYDROCHLORIDE 20 MG/ML
INJECTION INTRAVENOUS
Status: DISCONTINUED | OUTPATIENT
Start: 2022-11-10 | End: 2022-11-10

## 2022-11-10 RX ORDER — TRIAMCINOLONE ACETONIDE 40 MG/ML
INJECTION, SUSPENSION INTRA-ARTICULAR; INTRAMUSCULAR CODE/TRAUMA/SEDATION MEDICATION
Status: DISCONTINUED | OUTPATIENT
Start: 2022-11-10 | End: 2022-11-10 | Stop reason: HOSPADM

## 2022-11-10 RX ORDER — BUPIVACAINE HYDROCHLORIDE 2.5 MG/ML
INJECTION, SOLUTION INFILTRATION; PERINEURAL CODE/TRAUMA/SEDATION MEDICATION
Status: DISCONTINUED | OUTPATIENT
Start: 2022-11-10 | End: 2022-11-10 | Stop reason: HOSPADM

## 2022-11-10 RX ADMIN — SODIUM CHLORIDE: 9 INJECTION, SOLUTION INTRAVENOUS at 04:11

## 2022-11-10 RX ADMIN — LIDOCAINE HYDROCHLORIDE 50 MG: 20 INJECTION, SOLUTION INTRAVENOUS at 04:11

## 2022-11-10 RX ADMIN — PROPOFOL 50 MG: 10 INJECTION, EMULSION INTRAVENOUS at 04:11

## 2022-11-10 NOTE — BRIEF OP NOTE
Discharge Note  Short Stay    Admit Date: 11/10/2022    Discharge Date: 11/10/2022    Attending Physician: Twyla Gaston     Discharge Provider: Twyla Gaston    Diagnoses: Lumbosacral spondylosis    Discharged Condition: Good    Final Diagnoses: Lumbosacral spondylosis without myelopathy [M47.817]    Disposition: Home or Self Care    Hospital Course: No complications, uneventful    Outcome of Hospitalization, Treatment, Procedure, or Surgery:  Patient was admitted for outpatient interventional pain management procedure. The patient tolerated the procedure well with no complications.    Follow up/Patient Instructions:  Follow up as scheduled in Pain Management office in 3-4 weeks.  Patient has received instructions and follow up date and time.    Medications:  Continue previous medications

## 2022-11-10 NOTE — PLAN OF CARE
REFER TO WRITTEN DOCUMENT AND RECOVERY INFORMATION.    D/CD PATIENT [facility]AA WHEELCHAIR AT 1730.    INFORMED PATIENT IF UNABLE TO VOID IN 8 HOURS, GO TO ER. NOTIFY MD OF REDNESS OR DRAINAGE FROM INJECTION SITE OR FEVER OVER 3-4 DAY. REST AND DRINK PLENTY OF FLUIDS FOR THE REMAINDER OF THE DAY. NO LIFTING OVER 5 LBS FOR THE REMAINDER OF THE DAY. CONTINUE REGULAR MEDICATIONS AS PRESCRIBED. MAY TAKE PAIN MEDICATION AS PRESCRIBED.     PAIN IMPROVED  100%

## 2022-11-10 NOTE — TRANSFER OF CARE
"Anesthesia Transfer of Care Note    Patient: Pili Love    Procedure(s) Performed: Procedure(s) (LRB):  Block-nerve-medial branch-lumbar, bilateral L4 through S1 (Bilateral)    Patient location: Other: [facility]    Anesthesia Type: general    Transport from OR: Transported from OR on room air with adequate spontaneous ventilation    Post pain: adequate analgesia    Post assessment: no apparent anesthetic complications    Post vital signs: stable    Level of consciousness: sedated and responds to stimulation    Nausea/Vomiting: no nausea/vomiting    Complications: none    Transfer of care protocol was followedComments: Good SV continue, NAD noted, VSS, RTRN      Last vitals:   Visit Vitals  /77 (BP Location: Right arm, Patient Position: Lying)   Pulse 81   Temp 36.6 °C (97.8 °F)   Resp 13   Ht 5' 3" (1.6 m)   Wt 62.6 kg (138 lb)   SpO2 97%   BMI 24.45 kg/m²     "

## 2022-11-10 NOTE — ANESTHESIA PREPROCEDURE EVALUATION
11/10/2022  Pili Love is a 42 y.o., female.      Pre-op Assessment    I have reviewed the Patient Summary Reports.     I have reviewed the Nursing Notes. I have reviewed the NPO Status.   I have reviewed the Medications.     Review of Systems  Social:  Non-Smoker    Pulmonary:   Asthma    Musculoskeletal:   Arthritis     Neurological:   Neuromuscular Disease, Headaches   Chronic Pain Syndrome   Psych:   Psychiatric History          Physical Exam  General: Well nourished, Cooperative, Alert and Oriented    Airway:  Mallampati: II   Mouth Opening: Normal  TM Distance: Normal  Tongue: Normal  Neck ROM: Normal ROM        Anesthesia Plan  Type of Anesthesia, risks & benefits discussed:    Anesthesia Type: Gen Natural Airway  Intra-op Monitoring Plan: Standard ASA Monitors  Post Op Pain Control Plan: multimodal analgesia  Induction:  IV  Airway Plan: , Awake  Informed Consent: Informed consent signed with the Patient and all parties understand the risks and agree with anesthesia plan.  All questions answered. Patient consented to blood products? Yes  ASA Score: 3  Day of Surgery Review of History & Physical: H&P Update referred to the surgeon/provider.    Ready For Surgery From Anesthesia Perspective.     .

## 2022-11-10 NOTE — OP NOTE
Procedure Note    Procedure Date: 11/10/2022    Procedure Performed: Bilateral Lumbar Facet  Medial Branch Block @ L4-5,5-S1 with Fluoroscopic Guidance    Indications: Patient has failed conservative therapy.      Pre-op diagnosis: Lumbar Spondylosis    Post-op diagnosis: same    Physician: KAMILLE Gaston MD    Anesthesia: MAC    Estimated Blood Loss: Less than 1cc    IVF: Per Anesthesia    Complications: None    Technique:  The patient was interviewed in the holding area and Risks/Benefits were discussed, including but not limited to  the possibility of new or different pain, bleeding or infection.   All questions were answered.  The patient understood and accepted risks.  Consent was reviewed and signed.  A time out was taken to identify the patient, procedure and side of the procedure. The patient was placed in a prone position, then prepped and draped in the usual sterile fashion using ChloraPrep and sterile towels.  The levels were determined under fluoroscopic guidance and then marked.  1% Lidocaine was given by raising a skin wheal at the skin over each site and then infiltrated.  A 22-gauge 3.5 inch needle was introduced to the anatomic location of the bilateral L4-5,5-S1 medial branch nerves.  Then, after negative aspiration, 1 cc of a 1:1 mixture of  (Bupivacaine 0.25% 3cc  and  40mg kenalog ) was injected @ each level.The patient tolerated the procedure well and was transferred to the [facility] in stable condition.  The patient was monitored after the procedure.  Patient was given post procedure and discharge instructions to follow at home.  The patient was discharged in a stable condition with an adult .

## 2022-11-15 NOTE — ANESTHESIA POSTPROCEDURE EVALUATION
Anesthesia Post Evaluation    Patient: Pili Love    Procedure(s) Performed: Procedure(s) (LRB):  Block-nerve-medial branch-lumbar, bilateral L4 through S1 (Bilateral)    Final Anesthesia Type: general      Patient location during evaluation: PACU (pain)  Patient participation: Yes- Able to Participate  Level of consciousness: awake and alert  Post-procedure vital signs: reviewed and stable  Pain management: adequate  Airway patency: patent    PONV status at discharge: No PONV  Anesthetic complications: no      Cardiovascular status: stable  Respiratory status: unassisted, spontaneous ventilation and room air  Hydration status: euvolemic  Follow-up not needed.          Vitals Value Taken Time   /80 11/10/22 1725   Temp 36.6 °C (97.8 °F) 11/10/22 1657   Pulse 83 11/10/22 1727   Resp 15 11/10/22 1727   SpO2 94 % 11/10/22 1726   Vitals shown include unvalidated device data.      Event Time   Out of Recovery 17:26:00         Pain/Kizzy Score: No data recorded

## 2022-11-21 NOTE — H&P (VIEW-ONLY)
Subjective:         Patient ID: Pili Love is a 42 y.o. female.    Chief Complaint: Low-back Pain      Pain  This is a chronic problem. The current episode started more than 1 year ago. The problem occurs daily. The problem has been gradually improving. Associated symptoms include arthralgias, myalgias and neck pain. Pertinent negatives include no anorexia, change in bowel habit, chills, coughing, fever, sore throat, vertigo or vomiting.   Review of Systems   Constitutional:  Negative for activity change, appetite change, chills and fever.   HENT:  Negative for drooling, ear discharge, ear pain, facial swelling, nosebleeds, sore throat, trouble swallowing, voice change and goiter.    Eyes:  Negative for photophobia, pain, discharge, redness and visual disturbance.   Respiratory:  Negative for apnea, cough, choking, chest tightness, shortness of breath, wheezing and stridor.    Cardiovascular:  Negative for palpitations and leg swelling.   Gastrointestinal:  Negative for abdominal distention, anorexia, change in bowel habit, diarrhea, rectal pain, vomiting, fecal incontinence and change in bowel habit.   Endocrine: Negative for cold intolerance, heat intolerance, polydipsia, polyphagia and polyuria.   Genitourinary:  Negative for flank pain, frequency and hot flashes.   Musculoskeletal:  Positive for arthralgias, back pain, myalgias and neck pain.   Integumentary:  Negative for color change and pallor.   Allergic/Immunologic: Negative for immunocompromised state.   Neurological:  Negative for dizziness, vertigo, seizures, syncope, facial asymmetry, speech difficulty, light-headedness, coordination difficulties, memory loss and coordination difficulties.   Hematological:  Negative for adenopathy. Does not bruise/bleed easily.   Psychiatric/Behavioral:  Negative for agitation, behavioral problems, confusion, decreased concentration, dysphoric mood, hallucinations, self-injury and suicidal ideas. The patient is  "not nervous/anxious and is not hyperactive.          Past Medical History:   Diagnosis Date    Anxiety     Asthma     Depression     Migraine     Multiple sclerosis     Dr Antonio     Past Surgical History:   Procedure Laterality Date     bilateral cervical paraspinous muscle trigger point injection Bilateral [id number]    D Shows  x 4     SECTION      HYSTERECTOMY      INJECTION OF ANESTHETIC AGENT AROUND MEDIAL BRANCH NERVES INNERVATING LUMBAR FACET JOINT Bilateral 2022    Procedure: BLOCK, NERVE, FACET JOINT, LUMBAR, MEDIAL BRANCH;  Surgeon: Twyla Gaston MD;  Location: [facility];  Service: Pain Management;  Laterality: Bilateral;    INJECTION OF ANESTHETIC AGENT AROUND MEDIAL BRANCH NERVES INNERVATING LUMBAR FACET JOINT Bilateral 11/10/2022    Procedure: Block-nerve-medial branch-lumbar, bilateral L4 through S1;  Surgeon: Twyla Gaston MD;  Location: [facility];  Service: Pain Management;  Laterality: Bilateral;  patient will have to be tested    KNEE CARTILAGE SURGERY Right     LUMBAR PUNCTURE      Dr Trejo    Right C3-C7 FI Right 2018    Dr Trejo    TUBAL LIGATION      VAGINAL DELIVERY       Social History     Socioeconomic History    Marital status: Single   Tobacco Use    Smoking status: Never    Smokeless tobacco: Never   Substance and Sexual Activity    Alcohol use: Never    Drug use: Never     Family History   Problem Relation Age of Onset    Hypertension Father     Asthma Father     Hypertension Paternal Grandfather     Heart attack Paternal Grandfather      Review of patient's allergies indicates:   Allergen Reactions    Latex, natural rubber         Objective:  Vitals:    22 1406   BP: (!) 132/7   Pulse: 98   Resp: 16   Weight: 62.1 kg (137 lb)   Height: 5' 3" (1.6 m)   PainSc:   5         Physical Exam  Vitals and nursing note reviewed. Exam conducted with a chaperone present.   Constitutional:       General: She is awake. She is not in acute distress.     " Appearance: Normal appearance. She is not ill-appearing or diaphoretic.   HENT:      Head: Normocephalic and atraumatic.      Nose: Nose normal.      Mouth/Throat:      Mouth: Mucous membranes are moist.      Pharynx: Oropharynx is clear.   Eyes:      Conjunctiva/sclera: Conjunctivae normal.      Pupils: Pupils are equal, round, and reactive to light.   Cardiovascular:      Rate and Rhythm: Normal rate.   Pulmonary:      Effort: Pulmonary effort is normal. No respiratory distress.   Abdominal:      Palpations: Abdomen is soft.      Tenderness: There is no guarding.   Musculoskeletal:         General: Normal range of motion.      Cervical back: Normal range of motion and neck supple. Tenderness present. No rigidity.      Thoracic back: Tenderness present.      Lumbar back: Tenderness present.   Skin:     General: Skin is warm and dry.      Coloration: Skin is not jaundiced or pale.   Neurological:      General: No focal deficit present.      Mental Status: She is alert and oriented to person, place, and time. Mental status is at baseline.      Cranial Nerves: No cranial nerve deficit (II-XII).   Psychiatric:         Mood and Affect: Mood normal.         Behavior: Behavior normal. Behavior is cooperative.         Thought Content: Thought content normal.         [facility] for Pain Management  See OP Notes for results.     IMPRESSION: See OP Notes for results.     This procedure was auto-finalized by: Virtual Radiologist       Lab Visit on 11/08/2022   Component Date Value Ref Range Status    SARS CoV-2 PCR 11/08/2022 Negative  Negative, Invalid Final   Office Visit on 10/20/2022   Component Date Value Ref Range Status    See Scanned Report 10/20/2022 See Scanned Result   Final   Lab Visit on 08/29/2022   Component Date Value Ref Range Status    COVID-19 Ag 08/29/2022 Negative  Negative, Invalid Final   Lab Visit on 08/19/2022   Component Date Value Ref Range Status    SARS CoV-2 PCR 08/19/2022 Negative  Negative,  Invalid Final   Office Visit on 07/27/2022   Component Date Value Ref Range Status    POC Amphetamines 07/27/2022 Negative  Negative, Inconclusive Final    POC Barbiturates 07/27/2022 Negative  Negative, Inconclusive Final    POC Benzodiazepines 07/27/2022 Presumptive Positive (A)  Negative, Inconclusive Final    POC Cocaine 07/27/2022 Negative  Negative, Inconclusive Final    POC THC 07/27/2022 Negative  Negative, Inconclusive Final    POC Methadone 07/27/2022 Negative  Negative, Inconclusive Final    POC Methamphetamine 07/27/2022 Negative  Negative, Inconclusive Final    POC Opiates 07/27/2022 Presumptive Positive (A)  Negative, Inconclusive Final    POC Oxycodone 07/27/2022 Negative  Negative, Inconclusive Final    POC Phencyclidine 07/27/2022 Negative  Negative, Inconclusive Final    POC Methylenedioxymethamphetamine * 07/27/2022 Negative  Negative, Inconclusive Final    POC Tricyclic Antidepressants 07/27/2022 Negative  Negative, Inconclusive Final    POC Buprenorphine 07/27/2022 Negative   Final     Acceptable 07/27/2022 Yes   Final    POC Temperature (Urine) 07/27/2022 92   Final    pH, UA 07/27/2022 5.5  5.0 to 8.0 pH Units Final    Creatinine, Urine 07/27/2022 110  28 - 219 mg/dL Final    6-Acetylmorphine 07/27/2022 Negative  10 ng/mL Final    7-Aminoclonazepam 07/27/2022 Negative  Negative 25 ng/mL Final    a-Hydroxyalprazolam 07/27/2022 Negative  Negative 25 ng/mL Final    Acetyl Fentanyl 07/27/2022 Negative  2.5 ng/mL Final    Acetyl Norfentanyl Oxalate 07/27/2022 Negative  5 ng/mL Final    Benzoylecgonine 07/27/2022 Negative  100 ng/mL Final    Buprenorphine 07/27/2022 Negative  25 ng/mL Final    Codeine 07/27/2022 Negative  25 ng/mL Final    EDDP 07/27/2022 Negative  25 ng/mL Final    Fentanyl 07/27/2022 Negative  2.5 ng/mL Final    Hydrocodone 07/27/2022 >250.0 (H)  <25.0 25 ng/mL Final    Hydromorphone 07/27/2022 >250.0 (H)  <25.0 25 ng/mL Final    Lorazepam 07/27/2022 Negative  25  ng/mL Final    Morphine 07/27/2022 Negative  25 ng/mL Final    Norbuprenorphine 07/27/2022 Negative  25 ng/mL Final    Nordiazepam 07/27/2022 Negative  25 ng/mL Final    Norfentanyl Oxalate 07/27/2022 Negative  5 ng/mL Final    Norhydrocodone 07/27/2022 >500.0 (H)  <50.0 50 ng/mL Final    Noroxycodone HCL 07/27/2022 Negative  50 ng/mL Final    Oxazepam 07/27/2022 Negative  25 ng/mL Final    Oxymorphone 07/27/2022 Negative  25 ng/mL Final    Tapentadol 07/27/2022 Negative  25 ng/mL Final    Temazepam 07/27/2022 Negative  25 ng/mL Final    THC-COOH 07/27/2022 Negative  25 ng/mL Final    Tramadol 07/27/2022 Negative  100 ng/mL Final    Amphetamine, Urine 07/27/2022 Negative  Negative Final    Methamphetamines, Urine 07/27/2022 Negative  Negative Final    Methadone, Urine 07/27/2022 Negative  Negative 25 ng/mL Final    Oxycodone, Urine 07/27/2022 Negative  Negative 25 ng/mL Final    Specific Gravity, UA 07/27/2022 1.010  <=1.030 Final         Orders Placed This Encounter   Procedures    Case Request Operating Room: Radiofrequency Ablation, Nerve, Spinal, Lumbar, Medial Branch, Level L4-S1     Order Specific Question:   Medical Necessity:     Answer:   Medically Non-Urgent [100]     Order Specific Question:   CPT Code:     Answer:   MO DESTROY LUMB/SAC FACET JNT [10883]     Order Specific Question:   CPT Code:     Answer:   MO DESTROY L/S FACET JNT ADDL [23714]     Order Specific Question:   Is an on-site pathologist required for this procedure?     Answer:   N/A           Requested Prescriptions     Signed Prescriptions Disp Refills    tiZANidine 4 mg Cap 60 capsule 2     Sig: Take 1 capsule by mouth every 12 (twelve) hours.    baclofen (LIORESAL) 10 MG tablet 90 tablet 2     Sig: Take 1 tablet (10 mg total) by mouth 3 (three) times daily.    HYDROcodone-acetaminophen (NORCO)  mg per tablet 90 tablet 0     Sig: Take 1 tablet by mouth every 8 (eight) hours.       Assessment:     1. Lumbosacral spondylosis without  myelopathy    2. Cervical radiculopathy    3. Multiple sclerosis         A's of Opioid Risk Assessment  Activity:Patient can perform ADL.   Analgesia:Patients pain is partially controlled by current medication. Patient has tried OTC medications such as Tylenol and Ibuprofen with out relief.   Adverse Effects: Patient denies constipation or sedation.  Aberrant Behavior:  reviewed with no aberrant drug seeking/taking behavior.  Overdose reversal drug naloxone discussed    Drug screen reviewed      Plan:    [facility]um drug screen October 20, 2022 consistent with hydrocodone    Follows Neurology multiple sclerosis [facility]     Follow-up after lumbar L4 through S1 bilateral medial branch block # 2, November 10, 2022, she states she would 85% relief after procedure, she states the procedure did help increase her level of function     Complaint of low back pain worse with flexion extension rotation lumbar spine right greater than left tenderness long facet joint area lower back area facet joint in nature    Chronic neck and joint pain    Physical therapy notes reviewed in epic    X-ray lumbar spine [facility] May 2021 multiple level degenerative changes no fracture noted    MRI [facility] lumbar spine degenerative changes multiple levels will place report under media    Continue current medication    Continue home exercise program as directed     Indications for this procedure for this specific patient include the following   - Pt has had symptoms for three months with moderate to severe pain with functional impairment rated of 7/10 pain.   - Pain non-responsive to conservative care.    - Pain predominately axial and not associated with radiculopathy or claudication.    - No non-facet pathology as source of pain.    - Clinical assessment implicates facet joint as putative pain source.    - Pain is exacerbated by extension or prolonged sitting/standing and relieved by rest.    - No unexplained neurologic deficit.    - No history of  coagulopathy, infection or unstable medical conditions.    - Pain is causing significant functional limitation resulting in diminished quality of life and impaired age appropriate ADL's.   - Clinical assessment implicates facet joint as putative source of pain  - Repeat injections not done prior to 7 days   - no more than 2 levels will be done    The planned medically necessary  surgical procedure is performed in a hospital outpatient department and not in an ambulatory surgical center due to:     -there is no geographically assessable ambulatory surgery center that has the  necessary equipment and fluoroscopy needed for the procedure     -there is no geographically assessable ambulatory surgical center available at which the physician has privileges     -an ASC's  specific  guideline regarding the individuals weight or health conditions that prevent the use of an ASC    Monitor anesthesia request is medically indicated for the scheduled nerve block procedure due to:  1- needle phobia and anxiety, placing  the patient at risk during the provided service.  2-patient has an ASA class greater than 3 and requires constant presence of an anesthesiologist during the procedure,   3-patient has severe problems hard to lie still  4-patient suffers from chronic pain and is unable to function due to  diminished ADLs    Schedule right lumbar L4 through S1 RFTC, lumbosacral spondylosis    Proceed with left lumbar L4 through S1 RF TC after completing right side     Dr. Gaston    Bring original prescription medication bottles/container/box with labels to each visit    Pill count    Physical therapy [facility]    Massage therapy declined

## 2022-11-21 NOTE — PROGRESS NOTES
Subjective:         Patient ID: Pili Love is a 42 y.o. female.    Chief Complaint: Low-back Pain      Pain  This is a chronic problem. The current episode started more than 1 year ago. The problem occurs daily. The problem has been gradually improving. Associated symptoms include arthralgias, myalgias and neck pain. Pertinent negatives include no anorexia, change in bowel habit, chills, coughing, fever, sore throat, vertigo or vomiting.   Review of Systems   Constitutional:  Negative for activity change, appetite change, chills and fever.   HENT:  Negative for drooling, ear discharge, ear pain, facial swelling, nosebleeds, sore throat, trouble swallowing, voice change and goiter.    Eyes:  Negative for photophobia, pain, discharge, redness and visual disturbance.   Respiratory:  Negative for apnea, cough, choking, chest tightness, shortness of breath, wheezing and stridor.    Cardiovascular:  Negative for palpitations and leg swelling.   Gastrointestinal:  Negative for abdominal distention, anorexia, change in bowel habit, diarrhea, rectal pain, vomiting, fecal incontinence and change in bowel habit.   Endocrine: Negative for cold intolerance, heat intolerance, polydipsia, polyphagia and polyuria.   Genitourinary:  Negative for flank pain, frequency and hot flashes.   Musculoskeletal:  Positive for arthralgias, back pain, myalgias and neck pain.   Integumentary:  Negative for color change and pallor.   Allergic/Immunologic: Negative for immunocompromised state.   Neurological:  Negative for dizziness, vertigo, seizures, syncope, facial asymmetry, speech difficulty, light-headedness, coordination difficulties, memory loss and coordination difficulties.   Hematological:  Negative for adenopathy. Does not bruise/bleed easily.   Psychiatric/Behavioral:  Negative for agitation, behavioral problems, confusion, decreased concentration, dysphoric mood, hallucinations, self-injury and suicidal ideas. The patient is  "not nervous/anxious and is not hyperactive.          Past Medical History:   Diagnosis Date    Anxiety     Asthma     Depression     Migraine     Multiple sclerosis     Dr Antonio     Past Surgical History:   Procedure Laterality Date     bilateral cervical paraspinous muscle trigger point injection Bilateral [id number]    D Shows  x 4     SECTION      HYSTERECTOMY      INJECTION OF ANESTHETIC AGENT AROUND MEDIAL BRANCH NERVES INNERVATING LUMBAR FACET JOINT Bilateral 2022    Procedure: BLOCK, NERVE, FACET JOINT, LUMBAR, MEDIAL BRANCH;  Surgeon: Twyla Gaston MD;  Location: [facility];  Service: Pain Management;  Laterality: Bilateral;    INJECTION OF ANESTHETIC AGENT AROUND MEDIAL BRANCH NERVES INNERVATING LUMBAR FACET JOINT Bilateral 11/10/2022    Procedure: Block-nerve-medial branch-lumbar, bilateral L4 through S1;  Surgeon: Twyla Gaston MD;  Location: [facility];  Service: Pain Management;  Laterality: Bilateral;  patient will have to be tested    KNEE CARTILAGE SURGERY Right     LUMBAR PUNCTURE      Dr Trejo    Right C3-C7 FI Right 2018    Dr Trejo    TUBAL LIGATION      VAGINAL DELIVERY       Social History     Socioeconomic History    Marital status: Single   Tobacco Use    Smoking status: Never    Smokeless tobacco: Never   Substance and Sexual Activity    Alcohol use: Never    Drug use: Never     Family History   Problem Relation Age of Onset    Hypertension Father     Asthma Father     Hypertension Paternal Grandfather     Heart attack Paternal Grandfather      Review of patient's allergies indicates:   Allergen Reactions    Latex, natural rubber         Objective:  Vitals:    22 1406   BP: (!) 132/7   Pulse: 98   Resp: 16   Weight: 62.1 kg (137 lb)   Height: 5' 3" (1.6 m)   PainSc:   5         Physical Exam  Vitals and nursing note reviewed. Exam conducted with a chaperone present.   Constitutional:       General: She is awake. She is not in acute distress.     " Appearance: Normal appearance. She is not ill-appearing or diaphoretic.   HENT:      Head: Normocephalic and atraumatic.      Nose: Nose normal.      Mouth/Throat:      Mouth: Mucous membranes are moist.      Pharynx: Oropharynx is clear.   Eyes:      Conjunctiva/sclera: Conjunctivae normal.      Pupils: Pupils are equal, round, and reactive to light.   Cardiovascular:      Rate and Rhythm: Normal rate.   Pulmonary:      Effort: Pulmonary effort is normal. No respiratory distress.   Abdominal:      Palpations: Abdomen is soft.      Tenderness: There is no guarding.   Musculoskeletal:         General: Normal range of motion.      Cervical back: Normal range of motion and neck supple. Tenderness present. No rigidity.      Thoracic back: Tenderness present.      Lumbar back: Tenderness present.   Skin:     General: Skin is warm and dry.      Coloration: Skin is not jaundiced or pale.   Neurological:      General: No focal deficit present.      Mental Status: She is alert and oriented to person, place, and time. Mental status is at baseline.      Cranial Nerves: No cranial nerve deficit (II-XII).   Psychiatric:         Mood and Affect: Mood normal.         Behavior: Behavior normal. Behavior is cooperative.         Thought Content: Thought content normal.         [facility] for Pain Management  See OP Notes for results.     IMPRESSION: See OP Notes for results.     This procedure was auto-finalized by: Virtual Radiologist       Lab Visit on 11/08/2022   Component Date Value Ref Range Status    SARS CoV-2 PCR 11/08/2022 Negative  Negative, Invalid Final   Office Visit on 10/20/2022   Component Date Value Ref Range Status    See Scanned Report 10/20/2022 See Scanned Result   Final   Lab Visit on 08/29/2022   Component Date Value Ref Range Status    COVID-19 Ag 08/29/2022 Negative  Negative, Invalid Final   Lab Visit on 08/19/2022   Component Date Value Ref Range Status    SARS CoV-2 PCR 08/19/2022 Negative  Negative,  Invalid Final   Office Visit on 07/27/2022   Component Date Value Ref Range Status    POC Amphetamines 07/27/2022 Negative  Negative, Inconclusive Final    POC Barbiturates 07/27/2022 Negative  Negative, Inconclusive Final    POC Benzodiazepines 07/27/2022 Presumptive Positive (A)  Negative, Inconclusive Final    POC Cocaine 07/27/2022 Negative  Negative, Inconclusive Final    POC THC 07/27/2022 Negative  Negative, Inconclusive Final    POC Methadone 07/27/2022 Negative  Negative, Inconclusive Final    POC Methamphetamine 07/27/2022 Negative  Negative, Inconclusive Final    POC Opiates 07/27/2022 Presumptive Positive (A)  Negative, Inconclusive Final    POC Oxycodone 07/27/2022 Negative  Negative, Inconclusive Final    POC Phencyclidine 07/27/2022 Negative  Negative, Inconclusive Final    POC Methylenedioxymethamphetamine * 07/27/2022 Negative  Negative, Inconclusive Final    POC Tricyclic Antidepressants 07/27/2022 Negative  Negative, Inconclusive Final    POC Buprenorphine 07/27/2022 Negative   Final     Acceptable 07/27/2022 Yes   Final    POC Temperature (Urine) 07/27/2022 92   Final    pH, UA 07/27/2022 5.5  5.0 to 8.0 pH Units Final    Creatinine, Urine 07/27/2022 110  28 - 219 mg/dL Final    6-Acetylmorphine 07/27/2022 Negative  10 ng/mL Final    7-Aminoclonazepam 07/27/2022 Negative  Negative 25 ng/mL Final    a-Hydroxyalprazolam 07/27/2022 Negative  Negative 25 ng/mL Final    Acetyl Fentanyl 07/27/2022 Negative  2.5 ng/mL Final    Acetyl Norfentanyl Oxalate 07/27/2022 Negative  5 ng/mL Final    Benzoylecgonine 07/27/2022 Negative  100 ng/mL Final    Buprenorphine 07/27/2022 Negative  25 ng/mL Final    Codeine 07/27/2022 Negative  25 ng/mL Final    EDDP 07/27/2022 Negative  25 ng/mL Final    Fentanyl 07/27/2022 Negative  2.5 ng/mL Final    Hydrocodone 07/27/2022 >250.0 (H)  <25.0 25 ng/mL Final    Hydromorphone 07/27/2022 >250.0 (H)  <25.0 25 ng/mL Final    Lorazepam 07/27/2022 Negative  25  ng/mL Final    Morphine 07/27/2022 Negative  25 ng/mL Final    Norbuprenorphine 07/27/2022 Negative  25 ng/mL Final    Nordiazepam 07/27/2022 Negative  25 ng/mL Final    Norfentanyl Oxalate 07/27/2022 Negative  5 ng/mL Final    Norhydrocodone 07/27/2022 >500.0 (H)  <50.0 50 ng/mL Final    Noroxycodone HCL 07/27/2022 Negative  50 ng/mL Final    Oxazepam 07/27/2022 Negative  25 ng/mL Final    Oxymorphone 07/27/2022 Negative  25 ng/mL Final    Tapentadol 07/27/2022 Negative  25 ng/mL Final    Temazepam 07/27/2022 Negative  25 ng/mL Final    THC-COOH 07/27/2022 Negative  25 ng/mL Final    Tramadol 07/27/2022 Negative  100 ng/mL Final    Amphetamine, Urine 07/27/2022 Negative  Negative Final    Methamphetamines, Urine 07/27/2022 Negative  Negative Final    Methadone, Urine 07/27/2022 Negative  Negative 25 ng/mL Final    Oxycodone, Urine 07/27/2022 Negative  Negative 25 ng/mL Final    Specific Gravity, UA 07/27/2022 1.010  <=1.030 Final         Orders Placed This Encounter   Procedures    Case Request Operating Room: Radiofrequency Ablation, Nerve, Spinal, Lumbar, Medial Branch, Level L4-S1     Order Specific Question:   Medical Necessity:     Answer:   Medically Non-Urgent [100]     Order Specific Question:   CPT Code:     Answer:   SD DESTROY LUMB/SAC FACET JNT [37286]     Order Specific Question:   CPT Code:     Answer:   SD DESTROY L/S FACET JNT ADDL [98190]     Order Specific Question:   Is an on-site pathologist required for this procedure?     Answer:   N/A           Requested Prescriptions     Signed Prescriptions Disp Refills    tiZANidine 4 mg Cap 60 capsule 2     Sig: Take 1 capsule by mouth every 12 (twelve) hours.    baclofen (LIORESAL) 10 MG tablet 90 tablet 2     Sig: Take 1 tablet (10 mg total) by mouth 3 (three) times daily.    HYDROcodone-acetaminophen (NORCO)  mg per tablet 90 tablet 0     Sig: Take 1 tablet by mouth every 8 (eight) hours.       Assessment:     1. Lumbosacral spondylosis without  myelopathy    2. Cervical radiculopathy    3. Multiple sclerosis         A's of Opioid Risk Assessment  Activity:Patient can perform ADL.   Analgesia:Patients pain is partially controlled by current medication. Patient has tried OTC medications such as Tylenol and Ibuprofen with out relief.   Adverse Effects: Patient denies constipation or sedation.  Aberrant Behavior:  reviewed with no aberrant drug seeking/taking behavior.  Overdose reversal drug naloxone discussed    Drug screen reviewed      Plan:    Serum drug screen October 20, 2022 consistent with hydrocodone    Follows Neurology multiple sclerosis [facility]     Follow-up after lumbar L4 through S1 bilateral medial branch block # 2, November 10, 2022, she states she would 85% relief after procedure, she states the procedure did help increase her level of function     Complaint of low back pain worse with flexion extension rotation lumbar spine right greater than left tenderness long facet joint area lower back area facet joint in nature    Chronic neck and joint pain    Physical therapy notes reviewed in epic    X-ray lumbar spine [facility] May 2021 multiple level degenerative changes no fracture noted    MRI [facility] lumbar spine degenerative changes multiple levels will place report under media    Continue current medication    Continue home exercise program as directed     Indications for this procedure for this specific patient include the following   - Pt has had symptoms for three months with moderate to severe pain with functional impairment rated of 7/10 pain.   - Pain non-responsive to conservative care.    - Pain predominately axial and not associated with radiculopathy or claudication.    - No non-facet pathology as source of pain.    - Clinical assessment implicates facet joint as putative pain source.    - Pain is exacerbated by extension or prolonged sitting/standing and relieved by rest.    - No unexplained neurologic deficit.    - No history of  coagulopathy, infection or unstable medical conditions.    - Pain is causing significant functional limitation resulting in diminished quality of life and impaired age appropriate ADL's.   - Clinical assessment implicates facet joint as putative source of pain  - Repeat injections not done prior to 7 days   - no more than 2 levels will be done    The planned medically necessary  surgical procedure is performed in a hospital outpatient department and not in an ambulatory surgical center due to:     -there is no geographically assessable ambulatory surgery center that has the  necessary equipment and fluoroscopy needed for the procedure     -there is no geographically assessable ambulatory surgical center available at which the physician has privileges     -an ASC's  specific  guideline regarding the individuals weight or health conditions that prevent the use of an ASC    Monitor anesthesia request is medically indicated for the scheduled nerve block procedure due to:  1- needle phobia and anxiety, placing  the patient at risk during the provided service.  2-patient has an ASA class greater than 3 and requires constant presence of an anesthesiologist during the procedure,   3-patient has severe problems hard to lie still  4-patient suffers from chronic pain and is unable to function due to  diminished ADLs    Schedule right lumbar L4 through S1 RFTC, lumbosacral spondylosis    Proceed with left lumbar L4 through S1 RF TC after completing right side     Dr. Gaston    Bring original prescription medication bottles/container/box with labels to each visit    Pill count    Physical therapy [facility] 2022    Massage therapy declined      Ipledge Number (Optional): [id number] Ipledge Number (Optional): [id number]

## 2022-11-22 ENCOUNTER — OFFICE VISIT (OUTPATIENT)
Dept: PAIN MEDICINE | Facility: CLINIC | Age: 42
End: 2022-11-22
Payer: COMMERCIAL

## 2022-11-22 VITALS
DIASTOLIC BLOOD PRESSURE: 7 MMHG | BODY MASS INDEX: 24.27 KG/M2 | SYSTOLIC BLOOD PRESSURE: 132 MMHG | HEART RATE: 98 BPM | WEIGHT: 137 LBS | RESPIRATION RATE: 16 BRPM | HEIGHT: 63 IN

## 2022-11-22 DIAGNOSIS — M54.12 CERVICAL RADICULOPATHY: Chronic | ICD-10-CM

## 2022-11-22 DIAGNOSIS — G35 MULTIPLE SCLEROSIS: Chronic | ICD-10-CM

## 2022-11-22 DIAGNOSIS — M47.817 LUMBOSACRAL SPONDYLOSIS WITHOUT MYELOPATHY: Primary | Chronic | ICD-10-CM

## 2022-11-22 PROCEDURE — 3008F BODY MASS INDEX DOCD: CPT | Mod: CPTII,,, | Performed by: PHYSICIAN ASSISTANT

## 2022-11-22 PROCEDURE — 99215 OFFICE O/P EST HI 40 MIN: CPT | Mod: PBBFAC | Performed by: PHYSICIAN ASSISTANT

## 2022-11-22 PROCEDURE — 3078F PR MOST RECENT DIASTOLIC BLOOD PRESSURE < 80 MM HG: ICD-10-PCS | Mod: CPTII,,, | Performed by: PHYSICIAN ASSISTANT

## 2022-11-22 PROCEDURE — 3078F DIAST BP <80 MM HG: CPT | Mod: CPTII,,, | Performed by: PHYSICIAN ASSISTANT

## 2022-11-22 PROCEDURE — 99214 PR OFFICE/OUTPT VISIT, EST, LEVL IV, 30-39 MIN: ICD-10-PCS | Mod: S$PBB,,, | Performed by: PHYSICIAN ASSISTANT

## 2022-11-22 PROCEDURE — 99214 OFFICE O/P EST MOD 30 MIN: CPT | Mod: S$PBB,,, | Performed by: PHYSICIAN ASSISTANT

## 2022-11-22 PROCEDURE — 1159F MED LIST DOCD IN RCRD: CPT | Mod: CPTII,,, | Performed by: PHYSICIAN ASSISTANT

## 2022-11-22 PROCEDURE — 3008F PR BODY MASS INDEX (BMI) DOCUMENTED: ICD-10-PCS | Mod: CPTII,,, | Performed by: PHYSICIAN ASSISTANT

## 2022-11-22 PROCEDURE — 1159F PR MEDICATION LIST DOCUMENTED IN MEDICAL RECORD: ICD-10-PCS | Mod: CPTII,,, | Performed by: PHYSICIAN ASSISTANT

## 2022-11-22 PROCEDURE — 3075F PR MOST RECENT SYSTOLIC BLOOD PRESS GE 130-139MM HG: ICD-10-PCS | Mod: CPTII,,, | Performed by: PHYSICIAN ASSISTANT

## 2022-11-22 PROCEDURE — 3075F SYST BP GE 130 - 139MM HG: CPT | Mod: CPTII,,, | Performed by: PHYSICIAN ASSISTANT

## 2022-11-22 RX ORDER — HYDROCODONE BITARTRATE AND ACETAMINOPHEN 10; 325 MG/1; MG/1
1 TABLET ORAL EVERY 8 HOURS
Qty: 90 TABLET | Refills: 0 | Status: SHIPPED | OUTPATIENT
Start: 2022-11-22 | End: 2022-11-30 | Stop reason: SDUPTHER

## 2022-11-22 RX ORDER — TIZANIDINE HYDROCHLORIDE 4 MG/1
1 CAPSULE, GELATIN COATED ORAL EVERY 12 HOURS
Qty: 60 CAPSULE | Refills: 2 | Status: SHIPPED | OUTPATIENT
Start: 2022-11-22 | End: 2023-01-26 | Stop reason: SDUPTHER

## 2022-11-22 RX ORDER — BACLOFEN 10 MG/1
10 TABLET ORAL 3 TIMES DAILY
Qty: 90 TABLET | Refills: 2 | Status: SHIPPED | OUTPATIENT
Start: 2022-11-22 | End: 2023-01-26 | Stop reason: SDUPTHER

## 2022-11-22 NOTE — PATIENT INSTRUCTIONS
Procedure Instructions:    Nothing to eat or drink for 8 hours or after midnight including gum, candy, mints, or tobacco products.  If you are scheduled for 1:30 or later nothing to eat or drink after 5 a.m. the morning of the procedure, including gum, candy, mints, or tobacco products.  Must have a  at least 18 yrs of age to stay present at all times  No Diabetic medications the morning of procedure, check blood sugar the morning of procedure, if it is greater than 200 call the office at [phone]  If you are started on antibiotics or have been prescribed antibiotics, have a fever, or have any other type of infection call to reschedule procedure.  If you take blood pressure medications you can take it at your regular scheduled time with a small sip of WATER!    HOLD ASPIRIN AND ASPIRIN PRODUCTS  (ASPIRIN, BC POWDER ETC. ) FOR 7 DAYS  PRIOR TO PROCEDURE  HOLD NSAIDS( ibuprofen, mobic, meloxicam, advil, diclofenac, naproxen, relafen, celebrex,  methotrexate, aleve etc....)  FOR 3 DAYS   PRIOR TO PROCEDURE

## 2022-11-30 DIAGNOSIS — M47.817 LUMBOSACRAL SPONDYLOSIS WITHOUT MYELOPATHY: Chronic | ICD-10-CM

## 2022-11-30 DIAGNOSIS — M54.12 CERVICAL RADICULOPATHY: Chronic | ICD-10-CM

## 2022-11-30 DIAGNOSIS — G35 MULTIPLE SCLEROSIS: Chronic | ICD-10-CM

## 2022-11-30 RX ORDER — HYDROCODONE BITARTRATE AND ACETAMINOPHEN 10; 325 MG/1; MG/1
1 TABLET ORAL EVERY 8 HOURS
Qty: 90 TABLET | Refills: 0 | Status: SHIPPED | OUTPATIENT
Start: 2022-11-30 | End: 2022-11-30 | Stop reason: SDUPTHER

## 2022-11-30 RX ORDER — HYDROCODONE BITARTRATE AND ACETAMINOPHEN 10; 325 MG/1; MG/1
1 TABLET ORAL EVERY 8 HOURS
Qty: 90 TABLET | Refills: 0 | Status: SHIPPED | OUTPATIENT
Start: 2022-11-30 | End: 2022-12-29 | Stop reason: SDUPTHER

## 2022-11-30 NOTE — TELEPHONE ENCOUNTER
----- Message from Daylin Lawson sent at 11/29/2022  1:58 PM CST -----  Regarding: rx  Patient called and said she was able to  all of her meds except for her pain medicine. Patient wants to know if she could have something else sent in because she was told the pain med that was sent is on back order. [phone]  ROXY WALLIS   11/30/2022   9:57 AM   Spoke with patient will send prescription to [facility]

## 2022-12-15 ENCOUNTER — ANESTHESIA EVENT (OUTPATIENT)
Dept: PAIN MEDICINE | Facility: HOSPITAL | Age: 42
End: 2022-12-15
Payer: COMMERCIAL

## 2022-12-15 ENCOUNTER — ANESTHESIA (OUTPATIENT)
Dept: PAIN MEDICINE | Facility: HOSPITAL | Age: 42
End: 2022-12-15
Payer: COMMERCIAL

## 2022-12-15 ENCOUNTER — HOSPITAL ENCOUNTER (OUTPATIENT)
Facility: HOSPITAL | Age: 42
Discharge: HOME | End: 2022-12-15
Attending: PAIN MEDICINE | Admitting: PAIN MEDICINE
Payer: COMMERCIAL

## 2022-12-15 VITALS
TEMPERATURE: 98 F | SYSTOLIC BLOOD PRESSURE: 114 MMHG | BODY MASS INDEX: 25.34 KG/M2 | HEIGHT: 63 IN | RESPIRATION RATE: 14 BRPM | HEART RATE: 86 BPM | DIASTOLIC BLOOD PRESSURE: 82 MMHG | WEIGHT: 143 LBS | OXYGEN SATURATION: 100 %

## 2022-12-15 VITALS
SYSTOLIC BLOOD PRESSURE: 105 MMHG | RESPIRATION RATE: 17 BRPM | OXYGEN SATURATION: 98 % | DIASTOLIC BLOOD PRESSURE: 79 MMHG | HEART RATE: 85 BPM

## 2022-12-15 DIAGNOSIS — M47.817 LUMBOSACRAL SPONDYLOSIS WITHOUT MYELOPATHY: Primary | Chronic | ICD-10-CM

## 2022-12-15 DIAGNOSIS — M47.816 SPONDYLOSIS WITHOUT MYELOPATHY OR RADICULOPATHY, LUMBAR REGION: ICD-10-CM

## 2022-12-15 PROCEDURE — 27000284 HC CANNULA NASAL: Performed by: NURSE ANESTHETIST, CERTIFIED REGISTERED

## 2022-12-15 PROCEDURE — 64636 DESTROY L/S FACET JNT ADDL: CPT | Mod: RT | Performed by: PAIN MEDICINE

## 2022-12-15 PROCEDURE — 64635 DESTROY LUMB/SAC FACET JNT: CPT | Mod: RT,,, | Performed by: PAIN MEDICINE

## 2022-12-15 PROCEDURE — D9220A PRA ANESTHESIA: Mod: ,,, | Performed by: NURSE ANESTHETIST, CERTIFIED REGISTERED

## 2022-12-15 PROCEDURE — 27000716 HC OXISENSOR PROBE, ANY SIZE: Performed by: NURSE ANESTHETIST, CERTIFIED REGISTERED

## 2022-12-15 PROCEDURE — 25000003 PHARM REV CODE 250: Performed by: PAIN MEDICINE

## 2022-12-15 PROCEDURE — 25000003 PHARM REV CODE 250: Performed by: NURSE ANESTHETIST, CERTIFIED REGISTERED

## 2022-12-15 PROCEDURE — D9220A PRA ANESTHESIA: ICD-10-PCS | Mod: ,,, | Performed by: NURSE ANESTHETIST, CERTIFIED REGISTERED

## 2022-12-15 PROCEDURE — 37000009 HC ANESTHESIA EA ADD 15 MINS: Performed by: PAIN MEDICINE

## 2022-12-15 PROCEDURE — 63600175 PHARM REV CODE 636 W HCPCS: Performed by: NURSE ANESTHETIST, CERTIFIED REGISTERED

## 2022-12-15 PROCEDURE — 64636 DESTROY L/S FACET JNT ADDL: CPT | Mod: RT,,, | Performed by: PAIN MEDICINE

## 2022-12-15 PROCEDURE — 64635 DESTROY LUMB/SAC FACET JNT: CPT | Mod: RT | Performed by: PAIN MEDICINE

## 2022-12-15 PROCEDURE — 64635 PR DESTROY LUMB/SAC FACET JNT: ICD-10-PCS | Mod: RT,,, | Performed by: PAIN MEDICINE

## 2022-12-15 PROCEDURE — 37000008 HC ANESTHESIA 1ST 15 MINUTES: Performed by: PAIN MEDICINE

## 2022-12-15 PROCEDURE — 63600175 PHARM REV CODE 636 W HCPCS: Performed by: PAIN MEDICINE

## 2022-12-15 PROCEDURE — 64636 PR DESTROY L/S FACET JNT ADDL: ICD-10-PCS | Mod: RT,,, | Performed by: PAIN MEDICINE

## 2022-12-15 PROCEDURE — 27201423 OPTIME MED/SURG SUP & DEVICES STERILE SUPPLY: Performed by: PAIN MEDICINE

## 2022-12-15 RX ORDER — LIDOCAINE HYDROCHLORIDE 20 MG/ML
INJECTION, SOLUTION EPIDURAL; INFILTRATION; INTRACAUDAL; PERINEURAL
Status: DISCONTINUED | OUTPATIENT
Start: 2022-12-15 | End: 2022-12-15

## 2022-12-15 RX ORDER — PROPOFOL 10 MG/ML
VIAL (ML) INTRAVENOUS
Status: DISCONTINUED | OUTPATIENT
Start: 2022-12-15 | End: 2022-12-15

## 2022-12-15 RX ORDER — BUPIVACAINE HYDROCHLORIDE 2.5 MG/ML
INJECTION, SOLUTION INFILTRATION; PERINEURAL CODE/TRAUMA/SEDATION MEDICATION
Status: DISCONTINUED | OUTPATIENT
Start: 2022-12-15 | End: 2022-12-15 | Stop reason: HOSPADM

## 2022-12-15 RX ORDER — SODIUM CHLORIDE 9 MG/ML
INJECTION, SOLUTION INTRAVENOUS CONTINUOUS
Status: DISCONTINUED | OUTPATIENT
Start: 2022-12-15 | End: 2022-12-15 | Stop reason: HOSPADM

## 2022-12-15 RX ORDER — TRIAMCINOLONE ACETONIDE 40 MG/ML
INJECTION, SUSPENSION INTRA-ARTICULAR; INTRAMUSCULAR CODE/TRAUMA/SEDATION MEDICATION
Status: DISCONTINUED | OUTPATIENT
Start: 2022-12-15 | End: 2022-12-15 | Stop reason: HOSPADM

## 2022-12-15 RX ORDER — ORPHENADRINE CITRATE 30 MG/ML
INJECTION INTRAMUSCULAR; INTRAVENOUS
Status: DISCONTINUED | OUTPATIENT
Start: 2022-12-15 | End: 2022-12-15

## 2022-12-15 RX ADMIN — PROPOFOL 20 MG: 10 INJECTION, EMULSION INTRAVENOUS at 01:12

## 2022-12-15 RX ADMIN — PROPOFOL 50 MG: 10 INJECTION, EMULSION INTRAVENOUS at 01:12

## 2022-12-15 RX ADMIN — ORPHENADRINE CITRATE 60 MG: 30 INJECTION INTRAMUSCULAR; INTRAVENOUS at 01:12

## 2022-12-15 RX ADMIN — LIDOCAINE HYDROCHLORIDE 60 MG: 20 INJECTION, SOLUTION INTRAVENOUS at 01:12

## 2022-12-15 RX ADMIN — SODIUM CHLORIDE: 9 INJECTION, SOLUTION INTRAVENOUS at 01:12

## 2022-12-15 RX ADMIN — PROPOFOL 60 MG: 10 INJECTION, EMULSION INTRAVENOUS at 01:12

## 2022-12-15 NOTE — BRIEF OP NOTE
Discharge Note  Short Stay    Admit Date: 12/15/2022    Discharge Date: 12/15/2022    Attending Physician: Twyla Gaston     Discharge Provider: Twyla Gaston    Diagnoses:  Right lumbosacral spondylosis    Discharged Condition: Good    Final Diagnoses: Lumbosacral spondylosis without myelopathy [M47.817]    Disposition: Home or Self Care    Hospital Course: No complications, uneventful    Outcome of Hospitalization, Treatment, Procedure, or Surgery:  Patient was admitted for outpatient interventional pain management procedure. The patient tolerated the procedure well with no complications.    Follow up/Patient Instructions:  Follow up as scheduled in Pain Management office in 3-4 weeks.  Patient has received instructions and follow up date and time.    Medications:  Continue previous medications

## 2022-12-15 NOTE — ANESTHESIA PREPROCEDURE EVALUATION
12/15/2022  Pili Love is a 42 y.o., female.    Past Medical History:   Diagnosis Date    Anxiety     Asthma     Depression     Migraine     Multiple sclerosis     Dr Antonio       Past Surgical History:   Procedure Laterality Date     bilateral cervical paraspinous muscle trigger point injection Bilateral [id number]    D Shows  x 4     SECTION      FOOT SURGERY Left     left toe fusion    HYSTERECTOMY      INJECTION OF ANESTHETIC AGENT AROUND MEDIAL BRANCH NERVES INNERVATING LUMBAR FACET JOINT Bilateral 2022    Procedure: BLOCK, NERVE, FACET JOINT, LUMBAR, MEDIAL BRANCH;  Surgeon: Twyla Gaston MD;  Location: [facility];  Service: Pain Management;  Laterality: Bilateral;    INJECTION OF ANESTHETIC AGENT AROUND MEDIAL BRANCH NERVES INNERVATING LUMBAR FACET JOINT Bilateral 11/10/2022    Procedure: Block-nerve-medial branch-lumbar, bilateral L4 through S1;  Surgeon: Twyla Gaston MD;  Location: [facility];  Service: Pain Management;  Laterality: Bilateral;  patient will have to be tested    KNEE CARTILAGE SURGERY Right     LUMBAR PUNCTURE      Dr Trejo    Right C3-C7 FI Right 2018    Dr Trejo    TUBAL LIGATION      VAGINAL DELIVERY         Family History   Problem Relation Age of Onset    Hypertension Father     Asthma Father     Hypertension Paternal Grandfather     Heart attack Paternal Grandfather        Social History     Socioeconomic History    Marital status: Single   Tobacco Use    Smoking status: Never    Smokeless tobacco: Never   Substance and Sexual Activity    Alcohol use: Never    Drug use: Never       Current Facility-Administered Medications   Medication Dose Route Frequency Provider Last Rate Last Admin    0.9%  NaCl infusion   Intravenous Continuous Twyla Gaston MD           Review of patient's allergies indicates:    Allergen Reactions    Latex, natural rubber        Pre-op Assessment    I have reviewed the Patient Summary Reports.     I have reviewed the Nursing Notes. I have reviewed the NPO Status.   I have reviewed the Medications.     Review of Systems  Anesthesia Hx:  No problems with previous Anesthesia  Denies Family Hx of Anesthesia complications.   Denies Personal Hx of Anesthesia complications.   Cardiovascular:   Exercise tolerance: poor Hypertension hyperlipidemia ECG has been reviewed.    Musculoskeletal:   Arthritis   Musculoskeletal General/Symptoms: low back pain, joint pain.  Denies Lumbar Spine Disorders   Neurological:   Neuromuscular Disease, Headaches  Pain Syndrome  Chronic Pain Syndrome   Psych:   Psychiatric History depression  Anxiety Disorder.  Depression.          Physical Exam  General: Well nourished, Alert, Oriented and Cooperative    Airway:  Mouth Opening: Normal  Neck ROM: Normal ROM    Chest/Lungs:  Normal Respiratory Rate    Heart:  Rate: Normal        Anesthesia Plan  Type of Anesthesia, risks & benefits discussed:    Anesthesia Type: Gen Natural Airway, MAC  Intra-op Monitoring Plan: Standard ASA Monitors  Post Op Pain Control Plan: multimodal analgesia and IV/PO Opioids PRN  Induction:  IV  Informed Consent: Informed consent signed with the Patient and all parties understand the risks and agree with anesthesia plan.  All questions answered. Patient consented to blood products? Yes  ASA Score: 3  Day of Surgery Review of History & Physical: I have interviewed and examined the patient. I have reviewed the patient's H&P dated: There are no significant changes.     Ready For Surgery From Anesthesia Perspective.     .

## 2022-12-15 NOTE — PLAN OF CARE
Plan:  D/c pt via wheelchair at 1410  Informed pt if does not void in 8 hours to go to ER. Notify if redness, drainage, from injection site or fever over next 3-4 days. Rest and drink plenty of fluids for the remainder of the day. No lifting over 5 lbs. For the remainder of the day. Continue regular medications as prescribed. May take pain medications as prescribed.     Pain improved 100%

## 2022-12-15 NOTE — TRANSFER OF CARE
"Anesthesia Transfer of Care Note    Patient: Pili Love    Procedure(s) Performed: Procedure(s) (LRB):  Radiofrequency Ablation, Nerve, Spinal, Lumbar, Medial Branch, Level L4-S1 (Right)    Patient location: Other: [facility]    Anesthesia Type: general    Transport from OR: Transported from OR on room air with adequate spontaneous ventilation    Post pain: adequate analgesia    Post assessment: no apparent anesthetic complications    Post vital signs: stable    Level of consciousness: awake, alert and oriented    Nausea/Vomiting: no nausea/vomiting    Complications: none    Transfer of care protocol was followedComments: Good SV continue, NAD noted, VSS, RTRN      Last vitals:   Visit Vitals  /69   Pulse 85   Temp 36.7 °C (98.1 °F) (Oral)   Resp 16   Ht 5' 3" (1.6 m)   Wt 64.9 kg (143 lb)   SpO2 98%   BMI 25.33 kg/m²     "

## 2022-12-15 NOTE — DISCHARGE SUMMARY
[facility] - Pain Management  Discharge Note  Short Stay    Procedure(s) (LRB):  Radiofrequency Ablation, Nerve, Spinal, Lumbar, Medial Branch, Level L4-S1 (Right)      OUTCOME: Patient tolerated treatment/procedure well without complication and is now ready for discharge.    DISPOSITION: Home or Self Care    FINAL DIAGNOSIS:  Right lumbosacral spondylosis    FOLLOWUP: In clinic    DISCHARGE INSTRUCTIONS:  See nurse's notes     TIME SPENT ON DISCHARGE: 5 minutes

## 2022-12-15 NOTE — ANESTHESIA POSTPROCEDURE EVALUATION
Anesthesia Post Evaluation    Patient: Pili Love    Procedure(s) Performed: Procedure(s) (LRB):  Radiofrequency Ablation, Nerve, Spinal, Lumbar, Medial Branch, Level L4-S1 (Right)    Final Anesthesia Type: general      Patient location: [facility].  Patient participation: Yes- Able to Participate  Level of consciousness: awake and alert  Post-procedure vital signs: reviewed and stable  Pain management: adequate  Airway patency: patent    PONV status at discharge: No PONV  Anesthetic complications: no      Cardiovascular status: blood pressure returned to baseline, hemodynamically stable and stable  Respiratory status: unassisted  Hydration status: euvolemic  Follow-up not needed.  Comments: Pt voices appreciation for care          Vitals Value Taken Time   /82 12/15/22 1405   Temp 97 F 12/15/22 1535   Pulse 86 12/15/22 1405   Resp 14 12/15/22 1405   SpO2 100 % 12/15/22 1405         Event Time   Out of Recovery 14:05:40         Pain/Kizzy Score: Kizzy Score: 10 (12/15/2022  2:05 PM)

## 2022-12-15 NOTE — DISCHARGE INSTRUCTIONS
No driving, operating machinery, making legal decisions, or signing legal documents until tomorrow.   Continue diet as tolerated. Drink plenty of fluids and rest.   If unable to void in 8 hours proceed to nearest ER.   Notify MD of redness or drainage from incision site as well as any fever over the next 3-4 days.  No lifting over 5 lbs for the next 24 hrs.   Continue medications as prescribed. May take pain medication as prescribed.   May shower tomorrow. No tub baths for 48 hours following procedure.   May remove bandaids tomorrow, if they fall off before tomorrow they do not have to be replaced.          Procedure Instructions:    Nothing to eat or drink for 8 hours or after midnight including gum, candy, mints, or tobacco products.  If you are scheduled for 1:30 or later nothing to eat or drink after 5 a.m. the morning of the procedure, including gum, candy, mints, or tobacco products.  Must have a  at least 18 yrs of age to stay present at all times  No Diabetic medications the morning of procedure, check blood sugar the morning of procedure, if it is greater than 200 call the office at [phone]  If you are started on antibiotics or have been prescribed antibiotics, have a fever, or have any other type of infection call to reschedule procedure.  If you take blood pressure medications you can take it at your regular scheduled time with a small sip of WATER!    HOLD ASPIRIN AND ASPIRIN PRODUCTS  (ASPIRIN, BC POWDER ETC. ) FOR 7 DAYS  PRIOR TO PROCEDURE  HOLD NSAIDS( ibuprofen, mobic, meloxicam, advil, diclofenac, naproxen, relafen, celebrex,  methotrexate, aleve etc....)  FOR 3 DAYS   PRIOR TO PROCEDURE    Left L4-S1 RFTC scheduled for 01/05/2023 @ 11am

## 2022-12-15 NOTE — OP NOTE
Procedure Note    Procedure Date: 12/15/2022    Procedure Performed:  Radiofrequency ablation of the right  L3-4, 4-5, 5-S1 medial branch nerves utilizing fluoroscopy    Indications: Patient has failed conservative therapy.      Pre-op diagnosis: Lumbosacral Spondylosis    Post-op diagnosis: same    Physician: KAMILLE Gaston MD    Anesthesia:MAC    Medications injected:  Pre-lesion, 2ml of 1% lidocaine at each level.  From a 1:1 mixture of  (0.25% bupivacaine,  1% Lidocaine 40mg of kenalog)  1ml of this solution was injected at each level post-lesion.    Local anesthetic used: 1% Lidocaine, 10 ml     Estimated Blood Loss:Less than 1cc    IVF:Per Anesthesia    Complications: None    Technique:  The patient was interviewed in the holding area and Risks/Benefits were discussed, including, but not limited to  the possibility of new or different pain, bleeding or infection.   All questions were answered.  The patient understood and accepted risks.  The area of treatment was marked. Consent was reviewed and signed.  A time out was taken to identify the patient, procedure and side of the procedure. The patient was placed in a prone position, then prepped and draped in the usual sterile fashion using ChloraPrep and sterile towels.  The levels were determined under fluoroscopic guidance.   AP and oblique fluoroscopy were used to identify and kate the junctions between the superior articular processes and transverse processes at  right  L3-4,4-5,5-S1.  The Right sacral ala was also identified and marked.  The skin and subcutaneous tissues in these identified areas were anesthetized with 1% lidocaine. A 20-gauge 100 mm Chewse RF needle was advanced towards each of these points under fluoroscopic guidance such that the tip of the needle was positioned posterior to the Neuro-foramen on lateral fluoroscopic view. Each needle was positioned such that, on stimulation, the patient had an appropriate pain response between 0.3-0.5 V, with  no motor response, other than Lumbar Paraspinal Muscles up to 2.0V.  One mL of 2% lidocaine was then injected at each level prior to lesioning, which was performed for 90 seconds at 80°C.  Once the lesion was complete, 1 mL of a solution consisting of  a 1:1 mixture  (0.25% bupivacaine 2cc and 1% Lidocaine 2cc and 40mg kenalog)  was injected through each probe. The probes were removed and a sterile Band-Aid dressing was applied to the puncture site.  The patient tolerated the procedure well and was monitored after the procedure.  Patient was given post procedure and discharge instructions to follow at home.  The patient was discharged in a stable condition and accompanied by an adult.

## 2022-12-29 DIAGNOSIS — G35 MULTIPLE SCLEROSIS: Chronic | ICD-10-CM

## 2022-12-29 DIAGNOSIS — M54.12 CERVICAL RADICULOPATHY: Chronic | ICD-10-CM

## 2022-12-29 DIAGNOSIS — M47.817 LUMBOSACRAL SPONDYLOSIS WITHOUT MYELOPATHY: Chronic | ICD-10-CM

## 2022-12-29 RX ORDER — HYDROCODONE BITARTRATE AND ACETAMINOPHEN 10; 325 MG/1; MG/1
1 TABLET ORAL EVERY 8 HOURS
Qty: 90 TABLET | Refills: 0 | Status: SHIPPED | OUTPATIENT
Start: 2022-12-29 | End: 2023-01-26 | Stop reason: SDUPTHER

## 2022-12-29 NOTE — TELEPHONE ENCOUNTER
Pt had right L4-S1 RFTC on 12-15 with left L4-S1 RFTC scheduled for 1-5-2023 but did not ask for medication refill when she was here. Per , norco 10mg #90 was filled on 11-30. Dr Gaston will  send medication.

## 2022-12-29 NOTE — TELEPHONE ENCOUNTER
----- Message from Carole Reynoso sent at 12/29/2022  1:34 PM CST -----  Regarding: RX  PT RECEIVED A PROCEDURE ON 12-22-22 AND PT IS ALSO SCHEDULED FOR ANOTHER  PROCEDURE ON 01-05-23 PT IS OUT OF PAIN MEDS AND WOULD LIKE A RX FOR MEDS PLEASE SEND RX TO [facility] PLEASE CALL PT [phone]

## 2023-01-05 ENCOUNTER — ANESTHESIA (OUTPATIENT)
Dept: PAIN MEDICINE | Facility: HOSPITAL | Age: 43
End: 2023-01-05
Payer: COMMERCIAL

## 2023-01-05 ENCOUNTER — HOSPITAL ENCOUNTER (OUTPATIENT)
Facility: HOSPITAL | Age: 43
Discharge: HOME | End: 2023-01-05
Attending: PAIN MEDICINE | Admitting: PAIN MEDICINE
Payer: COMMERCIAL

## 2023-01-05 ENCOUNTER — ANESTHESIA EVENT (OUTPATIENT)
Dept: PAIN MEDICINE | Facility: HOSPITAL | Age: 43
End: 2023-01-05
Payer: COMMERCIAL

## 2023-01-05 VITALS
DIASTOLIC BLOOD PRESSURE: 82 MMHG | SYSTOLIC BLOOD PRESSURE: 113 MMHG | BODY MASS INDEX: 26.05 KG/M2 | TEMPERATURE: 99 F | OXYGEN SATURATION: 100 % | WEIGHT: 147 LBS | HEART RATE: 82 BPM | HEIGHT: 63 IN | RESPIRATION RATE: 14 BRPM

## 2023-01-05 DIAGNOSIS — M47.817 LUMBOSACRAL SPONDYLOSIS WITHOUT MYELOPATHY: Primary | Chronic | ICD-10-CM

## 2023-01-05 DIAGNOSIS — M47.816 SPONDYLOSIS OF LUMBAR REGION WITHOUT MYELOPATHY OR RADICULOPATHY: ICD-10-CM

## 2023-01-05 PROCEDURE — 63600175 PHARM REV CODE 636 W HCPCS: Performed by: NURSE ANESTHETIST, CERTIFIED REGISTERED

## 2023-01-05 PROCEDURE — 37000008 HC ANESTHESIA 1ST 15 MINUTES: Performed by: PAIN MEDICINE

## 2023-01-05 PROCEDURE — 25000003 PHARM REV CODE 250: Performed by: PAIN MEDICINE

## 2023-01-05 PROCEDURE — 64635 DESTROY LUMB/SAC FACET JNT: CPT | Mod: LT | Performed by: PAIN MEDICINE

## 2023-01-05 PROCEDURE — D9220A PRA ANESTHESIA: ICD-10-PCS | Mod: ,,, | Performed by: NURSE ANESTHETIST, CERTIFIED REGISTERED

## 2023-01-05 PROCEDURE — 64636 DESTROY L/S FACET JNT ADDL: CPT | Mod: LT | Performed by: PAIN MEDICINE

## 2023-01-05 PROCEDURE — 64635 DESTROY LUMB/SAC FACET JNT: CPT | Mod: LT,,, | Performed by: PAIN MEDICINE

## 2023-01-05 PROCEDURE — 64636 DESTROY L/S FACET JNT ADDL: CPT | Mod: LT,,, | Performed by: PAIN MEDICINE

## 2023-01-05 PROCEDURE — 64636 PR DESTROY L/S FACET JNT ADDL: ICD-10-PCS | Mod: LT,,, | Performed by: PAIN MEDICINE

## 2023-01-05 PROCEDURE — 25000003 PHARM REV CODE 250: Performed by: NURSE ANESTHETIST, CERTIFIED REGISTERED

## 2023-01-05 PROCEDURE — 37000009 HC ANESTHESIA EA ADD 15 MINS: Performed by: PAIN MEDICINE

## 2023-01-05 PROCEDURE — 63600175 PHARM REV CODE 636 W HCPCS: Performed by: PAIN MEDICINE

## 2023-01-05 PROCEDURE — 27201423 OPTIME MED/SURG SUP & DEVICES STERILE SUPPLY: Performed by: PAIN MEDICINE

## 2023-01-05 PROCEDURE — D9220A PRA ANESTHESIA: Mod: ,,, | Performed by: NURSE ANESTHETIST, CERTIFIED REGISTERED

## 2023-01-05 PROCEDURE — 64635 PR DESTROY LUMB/SAC FACET JNT: ICD-10-PCS | Mod: LT,,, | Performed by: PAIN MEDICINE

## 2023-01-05 RX ORDER — ORPHENADRINE CITRATE 30 MG/ML
INJECTION INTRAMUSCULAR; INTRAVENOUS
Status: DISCONTINUED | OUTPATIENT
Start: 2023-01-05 | End: 2023-01-05

## 2023-01-05 RX ORDER — PROPOFOL 10 MG/ML
VIAL (ML) INTRAVENOUS
Status: DISCONTINUED | OUTPATIENT
Start: 2023-01-05 | End: 2023-01-05

## 2023-01-05 RX ORDER — SODIUM CHLORIDE 9 MG/ML
INJECTION, SOLUTION INTRAVENOUS CONTINUOUS
Status: DISCONTINUED | OUTPATIENT
Start: 2023-01-05 | End: 2023-01-05 | Stop reason: HOSPADM

## 2023-01-05 RX ORDER — BUPIVACAINE HYDROCHLORIDE 2.5 MG/ML
INJECTION, SOLUTION INFILTRATION; PERINEURAL CODE/TRAUMA/SEDATION MEDICATION
Status: DISCONTINUED | OUTPATIENT
Start: 2023-01-05 | End: 2023-01-05 | Stop reason: HOSPADM

## 2023-01-05 RX ORDER — TRIAMCINOLONE ACETONIDE 40 MG/ML
INJECTION, SUSPENSION INTRA-ARTICULAR; INTRAMUSCULAR CODE/TRAUMA/SEDATION MEDICATION
Status: DISCONTINUED | OUTPATIENT
Start: 2023-01-05 | End: 2023-01-05 | Stop reason: HOSPADM

## 2023-01-05 RX ORDER — LIDOCAINE HYDROCHLORIDE 20 MG/ML
INJECTION, SOLUTION EPIDURAL; INFILTRATION; INTRACAUDAL; PERINEURAL
Status: DISCONTINUED | OUTPATIENT
Start: 2023-01-05 | End: 2023-01-05

## 2023-01-05 RX ADMIN — LIDOCAINE HYDROCHLORIDE 50 MG: 20 INJECTION, SOLUTION INTRAVENOUS at 12:01

## 2023-01-05 RX ADMIN — PROPOFOL 50 MG: 10 INJECTION, EMULSION INTRAVENOUS at 12:01

## 2023-01-05 RX ADMIN — SODIUM CHLORIDE: 9 INJECTION, SOLUTION INTRAVENOUS at 12:01

## 2023-01-05 RX ADMIN — PROPOFOL 100 MG: 10 INJECTION, EMULSION INTRAVENOUS at 12:01

## 2023-01-05 RX ADMIN — ORPHENADRINE CITRATE 60 MG: 30 INJECTION INTRAMUSCULAR; INTRAVENOUS at 12:01

## 2023-01-05 NOTE — DISCHARGE SUMMARY
[facility] - Pain Management  Discharge Note  Short Stay    Procedure(s) (LRB):  RADIOFREQUENCY ABLATION, NERVE, SPINAL, LUMBAR, MEDIAL BRANCH, 1 LEVEL (Left)      OUTCOME: Patient tolerated treatment/procedure well without complication and is now ready for discharge.    DISPOSITION: Home or Self Care    FINAL DIAGNOSIS:  Lumbosacral spondylosis    FOLLOWUP: In clinic    DISCHARGE INSTRUCTIONS:  See nurse's notes     TIME SPENT ON DISCHARGE: 5 minutes

## 2023-01-05 NOTE — ANESTHESIA PREPROCEDURE EVALUATION
2023  Pili Love is a 42 y.o., female.      Pre-op Assessment    I have reviewed the Patient Summary Reports.     I have reviewed the Nursing Notes. I have reviewed the NPO Status.   I have reviewed the Medications.     Review of Systems  Anesthesia Hx:  No problems with previous Anesthesia    Pulmonary:   Asthma    Musculoskeletal:   Arthritis  Multiple sclerosis   Neurological:   Neuromuscular Disease, Headaches   Chronic Pain Syndrome   Psych:   Psychiatric History anxiety depression        Past Medical History:   Diagnosis Date    Anxiety     Asthma     Depression     Migraine     Multiple sclerosis     Dr Antonio       Past Surgical History:   Procedure Laterality Date     bilateral cervical paraspinous muscle trigger point injection Bilateral 2225-3223    D Shows  x 4     SECTION      FOOT SURGERY Left     left toe fusion    HYSTERECTOMY      INJECTION OF ANESTHETIC AGENT AROUND MEDIAL BRANCH NERVES INNERVATING LUMBAR FACET JOINT Bilateral 2022    Procedure: BLOCK, NERVE, FACET JOINT, LUMBAR, MEDIAL BRANCH;  Surgeon: Twyla Gaston MD;  [facility];  Service: Pain Management;  Laterality: Bilateral;    INJECTION OF ANESTHETIC AGENT AROUND MEDIAL BRANCH NERVES INNERVATING LUMBAR FACET JOINT Bilateral 11/10/2022    Procedure: Block-nerve-medial branch-lumbar, bilateral L4 through S1;  Surgeon: Twyla Gaston MD;  Location: [facility];  Service: Pain Management;  Laterality: Bilateral;  patient will have to be tested    KNEE CARTILAGE SURGERY Right     LUMBAR PUNCTURE      Dr Trejo    RADIOFREQUENCY ABLATION OF LUMBAR MEDIAL BRANCH NERVE AT SINGLE LEVEL Right 12/15/2022    Procedure: Radiofrequency Ablation, Nerve, Spinal, Lumbar, Medial Branch, Level L4-S1;  Surgeon: Twyla Gaston MD;  Location: [facility];  Service: Pain  Management;  Laterality: Right;  cong left after right side is done    Right C3-C7 FI Right 04/17/2018    Dr Trejo    TUBAL LIGATION      VAGINAL DELIVERY         Family History   Problem Relation Age of Onset    Hypertension Father     Asthma Father     Hypertension Paternal Grandfather     Heart attack Paternal Grandfather        Social History     Socioeconomic History    Marital status: Single   Tobacco Use    Smoking status: Never    Smokeless tobacco: Never   Substance and Sexual Activity    Alcohol use: Never    Drug use: Never       Current Facility-Administered Medications   Medication Dose Route Frequency Provider Last Rate Last Admin    0.9%  NaCl infusion   Intravenous Continuous Twyla Gaston MD           Review of patient's allergies indicates:   Allergen Reactions    Latex, natural rubber        Physical Exam  General: Well nourished, Cooperative, Alert and Oriented    Airway:  Mallampati: I   Mouth Opening: Normal  TM Distance: Normal  Tongue: Normal  Neck ROM: Normal ROM    Dental:  Intact        Anesthesia Plan  Type of Anesthesia, risks & benefits discussed:    Anesthesia Type: Gen Natural Airway  Intra-op Monitoring Plan: Standard ASA Monitors  Post Op Pain Control Plan: multimodal analgesia  Induction:  IV  Informed Consent: Informed consent signed with the Patient and all parties understand the risks and agree with anesthesia plan.  All questions answered. Patient consented to blood products? Yes  ASA Score: 3  Day of Surgery Review of History & Physical: I have interviewed and examined the patient. I have reviewed the patient's H&P dated:     Ready For Surgery From Anesthesia Perspective.     .

## 2023-01-05 NOTE — OP NOTE
Procedure Note    Procedure Date: 1/5/2023    Procedure Performed:  Radiofrequency ablation of the left  L3-4,4-5,5-S1 medial branch nerves utilizing fluoroscopy    Indications: Patient has failed conservative therapy.      Pre-op diagnosis: Lumbosacral Spondylosis    Post-op diagnosis: same    Physician: KAMILLE Gaston MD    Anesthesia:MAC    Medications injected:  Pre-lesion, 2ml of 1% lidocaine at each level.  From a 1:1 mixture of  (0.25% bupivacaine,  1% Lidocaine 40mg of kenalog)  1ml of this solution was injected at each level post-lesion.    Local anesthetic used: 1% Lidocaine, 10 ml     Estimated Blood Loss:Less than 1cc    IVF:Per Anesthesia    Complications: None    Technique:  The patient was interviewed in the holding area and Risks/Benefits were discussed, including, but not limited to  the possibility of new or different pain, bleeding or infection.   All questions were answered.  The patient understood and accepted risks.  The area of treatment was marked. Consent was reviewed and signed.  A time out was taken to identify the patient, procedure and side of the procedure. The patient was placed in a prone position, then prepped and draped in the usual sterile fashion using ChloraPrep and sterile towels.  The levels were determined under fluoroscopic guidance.   AP and oblique fluoroscopy were used to identify and kate the junctions between the superior articular processes and transverse processes at  left  L L3-4, 4-5, 5-S1.  The left left sacral ala was also identified and marked.  The skin and subcutaneous tissues in these identified areas were anesthetized with 1% lidocaine. A 20-gauge 100 mm PanÃ¨ve RF needle was advanced towards each of these points under fluoroscopic guidance such that the tip of the needle was positioned posterior to the Neuro-foramen on lateral fluoroscopic view. Each needle was positioned such that, on stimulation, the patient had an appropriate pain response between 0.3-0.5 V,  with no motor response, other than Lumbar Paraspinal Muscles up to 2.0V.  One mL of 2% lidocaine was then injected at each level prior to lesioning, which was performed for 90 seconds at 80°C.  Once the lesion was complete, 1 mL of a solution consisting of  a 1:1 mixture  (0.25% bupivacaine 2cc and 1% Lidocaine 2cc and 40mg kenalog)  was injected through each probe. The probes were removed and a sterile Band-Aid dressing was applied to the puncture site.  The patient tolerated the procedure well and was monitored after the procedure.  Patient was given post procedure and discharge instructions to follow at home.  The patient was discharged in a stable condition and accompanied by an adult.

## 2023-01-05 NOTE — TRANSFER OF CARE
"Anesthesia Transfer of Care Note    Patient: Pili Love    Procedure(s) Performed: Procedure(s) (LRB):  RADIOFREQUENCY ABLATION, NERVE, SPINAL, LUMBAR, MEDIAL BRANCH, 1 LEVEL (Left)    Patient location: Other:    Anesthesia Type: general    Transport from OR: Transported from OR on room air with adequate spontaneous ventilation    Post pain: adequate analgesia    Post assessment: no apparent anesthetic complications    Post vital signs: stable    Level of consciousness: responds to stimulation    Nausea/Vomiting: no nausea/vomiting    Complications: none    Transfer of care protocol was followed      Last vitals:   Visit Vitals  /69   Pulse 84   Temp 37 °C (98.6 °F) (Oral)   Resp 16   Ht 5' 3" (1.6 m)   Wt 66.7 kg (147 lb)   SpO2 99%   BMI 26.04 kg/m²     "

## 2023-01-05 NOTE — H&P
Subjective:         Patient ID: Plii Love is a 42 y.o. female.    Chief Complaint: Low-back Pain    Pain  This is a chronic problem. The current episode started more than 1 year ago. The problem occurs daily. The problem has been gradually improving. Associated symptoms include arthralgias, myalgias and neck pain. Pertinent negatives include no anorexia, change in bowel habit, chills, coughing, fever, sore throat, vertigo or vomiting.   Review of Systems   Constitutional:  Negative for activity change, appetite change, chills and fever.   HENT:  Negative for drooling, ear discharge, ear pain, facial swelling, nosebleeds, sore throat, trouble swallowing, voice change and goiter.    Eyes:  Negative for photophobia, pain, discharge, redness and visual disturbance.   Respiratory:  Negative for apnea, cough, choking, chest tightness, shortness of breath, wheezing and stridor.    Cardiovascular:  Negative for palpitations and leg swelling.   Gastrointestinal:  Negative for abdominal distention, anorexia, change in bowel habit, diarrhea, rectal pain, vomiting, fecal incontinence and change in bowel habit.   Endocrine: Negative for cold intolerance, heat intolerance, polydipsia, polyphagia and polyuria.   Genitourinary:  Negative for flank pain, frequency and hot flashes.   Musculoskeletal:  Positive for arthralgias, back pain, myalgias and neck pain.   Integumentary:  Negative for color change and pallor.   Allergic/Immunologic: Negative for immunocompromised state.   Neurological:  Negative for dizziness, vertigo, seizures, syncope, facial asymmetry, speech difficulty, light-headedness, coordination difficulties, memory loss and coordination difficulties.   Hematological:  Negative for adenopathy. Does not bruise/bleed easily.   Psychiatric/Behavioral:  Negative for agitation, behavioral problems, confusion, decreased concentration, dysphoric mood, hallucinations, self-injury and suicidal ideas. The patient is not  "nervous/anxious and is not hyperactive.          Past Medical History:   Diagnosis Date    Anxiety     Asthma     Depression     Migraine     Multiple sclerosis     Dr Antonio     Past Surgical History:   Procedure Laterality Date     bilateral cervical paraspinous muscle trigger point injection Bilateral [id number]    D Shows  x 4     SECTION      HYSTERECTOMY      INJECTION OF ANESTHETIC AGENT AROUND MEDIAL BRANCH NERVES INNERVATING LUMBAR FACET JOINT Bilateral 2022    Procedure: BLOCK, NERVE, FACET JOINT, LUMBAR, MEDIAL BRANCH;  Surgeon: Twyla Gaston MD;  Location: [facility];  Service: Pain Management;  Laterality: Bilateral;    INJECTION OF ANESTHETIC AGENT AROUND MEDIAL BRANCH NERVES INNERVATING LUMBAR FACET JOINT Bilateral 11/10/2022    Procedure: Block-nerve-medial branch-lumbar, bilateral L4 through S1;  Surgeon: Twyla Gaston MD;  Location: [facility];  Service: Pain Management;  Laterality: Bilateral;  patient will have to be tested    KNEE CARTILAGE SURGERY Right     LUMBAR PUNCTURE      Dr Trejo    Right C3-C7 FI Right 2018    Dr Trejo    TUBAL LIGATION      VAGINAL DELIVERY       Social History     Socioeconomic History    Marital status: Single   Tobacco Use    Smoking status: Never    Smokeless tobacco: Never   Substance and Sexual Activity    Alcohol use: Never    Drug use: Never     Family History   Problem Relation Age of Onset    Hypertension Father     Asthma Father     Hypertension Paternal Grandfather     Heart attack Paternal Grandfather      Review of patient's allergies indicates:   Allergen Reactions    Latex, natural rubber         Objective:  Vitals:    22 1406   BP: (!) 132/7   Pulse: 98   Resp: 16   Weight: 62.1 kg (137 lb)   Height: 5' 3" (1.6 m)   PainSc:   5         Physical Exam  Vitals and nursing note reviewed. Exam conducted with a chaperone present.   Constitutional:       General: She is awake. She is not in acute distress.     " Appearance: Normal appearance. She is not ill-appearing or diaphoretic.   HENT:      Head: Normocephalic and atraumatic.      Nose: Nose normal.      Mouth/Throat:      Mouth: Mucous membranes are moist.      Pharynx: Oropharynx is clear.   Eyes:      Conjunctiva/sclera: Conjunctivae normal.      Pupils: Pupils are equal, round, and reactive to light.   Cardiovascular:      Rate and Rhythm: Normal rate.   Pulmonary:      Effort: Pulmonary effort is normal. No respiratory distress.   Abdominal:      Palpations: Abdomen is soft.      Tenderness: There is no guarding.   Musculoskeletal:         General: Normal range of motion.      Cervical back: Normal range of motion and neck supple. Tenderness present. No rigidity.      Thoracic back: Tenderness present.      Lumbar back: Tenderness present.   Skin:     General: Skin is warm and dry.      Coloration: Skin is not jaundiced or pale.   Neurological:      General: No focal deficit present.      Mental Status: She is alert and oriented to person, place, and time. Mental status is at baseline.      Cranial Nerves: No cranial nerve deficit (II-XII).   Psychiatric:         Mood and Affect: Mood normal.         Behavior: Behavior normal. Behavior is cooperative.         Thought Content: Thought content normal.         [facility] for Pain Management  See OP Notes for results.     IMPRESSION: See OP Notes for results.     This procedure was auto-finalized by: Virtual Radiologist       Lab Visit on 11/08/2022   Component Date Value Ref Range Status    SARS CoV-2 PCR 11/08/2022 Negative  Negative, Invalid Final   Office Visit on 10/20/2022   Component Date Value Ref Range Status    See Scanned Report 10/20/2022 See Scanned Result   Final   Lab Visit on 08/29/2022   Component Date Value Ref Range Status    COVID-19 Ag 08/29/2022 Negative  Negative, Invalid Final   Lab Visit on 08/19/2022   Component Date Value Ref Range Status    SARS CoV-2 PCR 08/19/2022 Negative  Negative,  Invalid Final   Office Visit on 07/27/2022   Component Date Value Ref Range Status    POC Amphetamines 07/27/2022 Negative  Negative, Inconclusive Final    POC Barbiturates 07/27/2022 Negative  Negative, Inconclusive Final    POC Benzodiazepines 07/27/2022 Presumptive Positive (A)  Negative, Inconclusive Final    POC Cocaine 07/27/2022 Negative  Negative, Inconclusive Final    POC THC 07/27/2022 Negative  Negative, Inconclusive Final    POC Methadone 07/27/2022 Negative  Negative, Inconclusive Final    POC Methamphetamine 07/27/2022 Negative  Negative, Inconclusive Final    POC Opiates 07/27/2022 Presumptive Positive (A)  Negative, Inconclusive Final    POC Oxycodone 07/27/2022 Negative  Negative, Inconclusive Final    POC Phencyclidine 07/27/2022 Negative  Negative, Inconclusive Final    POC Methylenedioxymethamphetamine * 07/27/2022 Negative  Negative, Inconclusive Final    POC Tricyclic Antidepressants 07/27/2022 Negative  Negative, Inconclusive Final    POC Buprenorphine 07/27/2022 Negative   Final     Acceptable 07/27/2022 Yes   Final    POC Temperature (Urine) 07/27/2022 92   Final    pH, UA 07/27/2022 5.5  5.0 to 8.0 pH Units Final    Creatinine, Urine 07/27/2022 110  28 - 219 mg/dL Final    6-Acetylmorphine 07/27/2022 Negative  10 ng/mL Final    7-Aminoclonazepam 07/27/2022 Negative  Negative 25 ng/mL Final    a-Hydroxyalprazolam 07/27/2022 Negative  Negative 25 ng/mL Final    Acetyl Fentanyl 07/27/2022 Negative  2.5 ng/mL Final    Acetyl Norfentanyl Oxalate 07/27/2022 Negative  5 ng/mL Final    Benzoylecgonine 07/27/2022 Negative  100 ng/mL Final    Buprenorphine 07/27/2022 Negative  25 ng/mL Final    Codeine 07/27/2022 Negative  25 ng/mL Final    EDDP 07/27/2022 Negative  25 ng/mL Final    Fentanyl 07/27/2022 Negative  2.5 ng/mL Final    Hydrocodone 07/27/2022 >250.0 (H)  <25.0 25 ng/mL Final    Hydromorphone 07/27/2022 >250.0 (H)  <25.0 25 ng/mL Final    Lorazepam 07/27/2022 Negative  25  ng/mL Final    Morphine 07/27/2022 Negative  25 ng/mL Final    Norbuprenorphine 07/27/2022 Negative  25 ng/mL Final    Nordiazepam 07/27/2022 Negative  25 ng/mL Final    Norfentanyl Oxalate 07/27/2022 Negative  5 ng/mL Final    Norhydrocodone 07/27/2022 >500.0 (H)  <50.0 50 ng/mL Final    Noroxycodone HCL 07/27/2022 Negative  50 ng/mL Final    Oxazepam 07/27/2022 Negative  25 ng/mL Final    Oxymorphone 07/27/2022 Negative  25 ng/mL Final    Tapentadol 07/27/2022 Negative  25 ng/mL Final    Temazepam 07/27/2022 Negative  25 ng/mL Final    THC-COOH 07/27/2022 Negative  25 ng/mL Final    Tramadol 07/27/2022 Negative  100 ng/mL Final    Amphetamine, Urine 07/27/2022 Negative  Negative Final    Methamphetamines, Urine 07/27/2022 Negative  Negative Final    Methadone, Urine 07/27/2022 Negative  Negative 25 ng/mL Final    Oxycodone, Urine 07/27/2022 Negative  Negative 25 ng/mL Final    Specific Gravity, UA 07/27/2022 1.010  <=1.030 Final         Orders Placed This Encounter   Procedures    Case Request Operating Room: Radiofrequency Ablation, Nerve, Spinal, Lumbar, Medial Branch, Level L4-S1     Order Specific Question:   Medical Necessity:     Answer:   Medically Non-Urgent [100]     Order Specific Question:   CPT Code:     Answer:   SD DESTROY LUMB/SAC FACET JNT [95015]     Order Specific Question:   CPT Code:     Answer:   SD DESTROY L/S FACET JNT ADDL [42598]     Order Specific Question:   Is an on-site pathologist required for this procedure?     Answer:   N/A           Requested Prescriptions     Signed Prescriptions Disp Refills    tiZANidine 4 mg Cap 60 capsule 2     Sig: Take 1 capsule by mouth every 12 (twelve) hours.    baclofen (LIORESAL) 10 MG tablet 90 tablet 2     Sig: Take 1 tablet (10 mg total) by mouth 3 (three) times daily.    HYDROcodone-acetaminophen (NORCO)  mg per tablet 90 tablet 0     Sig: Take 1 tablet by mouth every 8 (eight) hours.       Assessment:     1. Lumbosacral spondylosis without  myelopathy    2. Cervical radiculopathy    3. Multiple sclerosis         A's of Opioid Risk Assessment  Activity:Patient can perform ADL.   Analgesia:Patients pain is partially controlled by current medication. Patient has tried OTC medications such as Tylenol and Ibuprofen with out relief.   Adverse Effects: Patient denies constipation or sedation.  Aberrant Behavior:  reviewed with no aberrant drug seeking/taking behavior.  Overdose reversal drug naloxone discussed    Drug screen reviewed      Plan:    Serum drug screen October 20, 2022 consistent with hydrocodone    Follows Neurology multiple sclerosis [facility]     Follow-up after lumbar L4 through S1 bilateral medial branch block # 2, November 10, 2022, she states she would 85% relief after procedure, she states the procedure did help increase her level of function     Complaint of low back pain worse with flexion extension rotation lumbar spine right greater than left tenderness long facet joint area lower back area facet joint in nature    Chronic neck and joint pain    Physical therapy notes reviewed in epic    X-ray lumbar spine [facility] May 2021 multiple level degenerative changes no fracture noted    MRI [facility] lumbar spine degenerative changes multiple levels will place report under media    Continue current medication    Continue home exercise program as directed     Indications for this procedure for this specific patient include the following   - Pt has had symptoms for three months with moderate to severe pain with functional impairment rated of 7/10 pain.   - Pain non-responsive to conservative care.    - Pain predominately axial and not associated with radiculopathy or claudication.    - No non-facet pathology as source of pain.    - Clinical assessment implicates facet joint as putative pain source.    - Pain is exacerbated by extension or prolonged sitting/standing and relieved by rest.    - No unexplained neurologic deficit.    - No history of  coagulopathy, infection or unstable medical conditions.    - Pain is causing significant functional limitation resulting in diminished quality of life and impaired age appropriate ADL's.   - Clinical assessment implicates facet joint as putative source of pain  - Repeat injections not done prior to 7 days   - no more than 2 levels will be done    The planned medically necessary  surgical procedure is performed in a hospital outpatient department and not in an ambulatory surgical center due to:     -there is no geographically assessable ambulatory surgery center that has the  necessary equipment and fluoroscopy needed for the procedure     -there is no geographically assessable ambulatory surgical center available at which the physician has privileges     -an ASC's  specific  guideline regarding the individuals weight or health conditions that prevent the use of an ASC    Monitor anesthesia request is medically indicated for the scheduled nerve block procedure due to:  1- needle phobia and anxiety, placing  the patient at risk during the provided service.  2-patient has an ASA class greater than 3 and requires constant presence of an anesthesiologist during the procedure,   3-patient has severe problems hard to lie still  4-patient suffers from chronic pain and is unable to function due to  diminished ADLs        Proceed with left lumbar L4 through S1 RF TC          Bring original prescription medication bottles/container/box with labels to each visit    Pill count    Physical therapy [facility]22    Massage therapy declined     Review of Systems   Constitutional:  Negative for activity change, appetite change, chills and fever.   HENT:  Negative for drooling, ear discharge, ear pain, facial swelling, nosebleeds, sore throat, trouble swallowing and voice change.    Eyes:  Negative for photophobia, pain, discharge, redness and visual disturbance.   Respiratory:  Negative for apnea, cough, choking, chest tightness,  shortness of breath, wheezing and stridor.    Cardiovascular:  Negative for palpitations and leg swelling.   Gastrointestinal:  Negative for abdominal distention, anorexia, change in bowel habit, diarrhea, rectal pain and vomiting.   Genitourinary:  Negative for flank pain and frequency.   Musculoskeletal:  Positive for arthralgias, back pain, myalgias and neck pain.   Skin:  Negative for color change and pallor.   Neurological:  Negative for dizziness, vertigo, seizures, syncope, facial asymmetry, speech difficulty, light-headedness and disturbances in coordination.   Endo/Heme/Allergies:  Negative for cold intolerance, heat intolerance, polydipsia, polyphagia and adenopathy. Does not bruise/bleed easily.   Psychiatric/Behavioral:  Negative for agitation, behavioral problems, confusion, decreased concentration, dysphoric mood, hallucinations, self-injury and suicidal ideas. The patient is not nervous/anxious and is not hyperactive.

## 2023-01-05 NOTE — BRIEF OP NOTE
Discharge Note  Short Stay    Admit Date: 1/5/2023    Discharge Date: 1/5/2023    Attending Physician: Twyla Gaston     Discharge Provider: Twyla Gaston    Diagnoses:  Lumbosacral spondylosis    Discharged Condition: Good    Final Diagnoses: Lumbosacral spondylosis [M47.817]    Disposition: Home or Self Care    Hospital Course: No complications, uneventful    Outcome of Hospitalization, Treatment, Procedure, or Surgery:  Patient was admitted for outpatient interventional pain management procedure. The patient tolerated the procedure well with no complications.    Follow up/Patient Instructions:  Follow up as scheduled in Pain Management office in 3-4 weeks.  Patient has received instructions and follow up date and time.    Medications:  Continue previous medications

## 2023-01-05 NOTE — PLAN OF CARE
REFER TO WRITTEN DOCUMENT AND RECOVERY INFORMATION.    D/CD PATIENT VIAA WHEELCHAIR AT 1308.    INFORMED PATIENT IF UNABLE TO VOID IN 8 HOURS, GO TO ER. NOTIFY MD OF REDNESS OR DRAINAGE FROM INJECTION SITE OR FEVER OVER 3-4 DAY. REST AND DRINK PLENTY OF FLUIDS FOR THE REMAINDER OF THE DAY. NO LIFTING OVER 5 LBS FOR THE REMAINDER OF THE DAY. CONTINUE REGULAR MEDICATIONS AS PRESCRIBED. MAY TAKE PAIN MEDICATION AS PRESCRIBED.     PAIN IMPROVED  0%

## 2023-01-06 NOTE — ANESTHESIA POSTPROCEDURE EVALUATION
Anesthesia Post Evaluation    Patient: Pili Love    Procedure(s) Performed: Procedure(s) (LRB):  RADIOFREQUENCY ABLATION, NERVE, SPINAL, LUMBAR, MEDIAL BRANCH, 1 LEVEL (Left)    Final Anesthesia Type: general      Patient location: [facility].  Patient participation: Yes- Able to Participate  Level of consciousness: awake and alert  Post-procedure vital signs: reviewed and stable  Pain management: adequate  Airway patency: patent    PONV status at discharge: No PONV  Anesthetic complications: no      Cardiovascular status: blood pressure returned to baseline, hemodynamically stable and stable  Respiratory status: unassisted  Hydration status: euvolemic  Follow-up not needed.  Comments: Pt voices appreciation for care          Vitals Value Taken Time   /82 01/05/23 1300   Temp 37 °C (98.6 °F) 01/05/23 1230   Pulse 81 01/05/23 1301   Resp 15 01/05/23 1301   SpO2 100 % 01/05/23 1301   Vitals shown include unvalidated device data.      Event Time   Out of Recovery 13:06:00         Pain/Kizzy Score: Kizzy Score: 10 (1/5/2023  1:00 PM)         I called the patient she states it started 3 days ago and she has experienced this in the past and her pcp said it was a ganglion cyst. I advised the patient per  that her scan was clear and did not show anything to explain that. Advised to be seen by her pcp to evaluate. The patient is in agreement with this plan.

## 2023-01-26 NOTE — PROGRESS NOTES
Subjective:         Patient ID: Pili Love is a 42 y.o. female.    Chief Complaint: Low-back Pain and Neck Pain        Pain  This is a chronic problem. The current episode started more than 1 year ago. The problem occurs daily. The problem has been waxing and waning. Associated symptoms include arthralgias, myalgias and neck pain. Pertinent negatives include no anorexia, change in bowel habit, chills, coughing, diaphoresis, fever, sore throat, vertigo or vomiting.   Review of Systems   Constitutional:  Negative for activity change, appetite change, chills, diaphoresis and fever.   HENT:  Negative for drooling, ear discharge, ear pain, facial swelling, nosebleeds, sore throat, trouble swallowing, voice change and goiter.    Eyes:  Negative for photophobia, pain, discharge, redness and visual disturbance.   Respiratory:  Negative for apnea, cough, choking, chest tightness, shortness of breath, wheezing and stridor.    Cardiovascular:  Negative for palpitations and leg swelling.   Gastrointestinal:  Negative for abdominal distention, anorexia, change in bowel habit, diarrhea, rectal pain, vomiting, fecal incontinence and change in bowel habit.   Endocrine: Negative for cold intolerance, heat intolerance, polydipsia, polyphagia and polyuria.   Genitourinary:  Negative for flank pain, frequency and hot flashes.   Musculoskeletal:  Positive for arthralgias, back pain, myalgias and neck pain.   Integumentary:  Negative for color change and pallor.   Allergic/Immunologic: Negative for immunocompromised state.   Neurological:  Negative for dizziness, vertigo, seizures, syncope, facial asymmetry, speech difficulty, light-headedness, coordination difficulties, memory loss and coordination difficulties.   Hematological:  Negative for adenopathy. Does not bruise/bleed easily.   Psychiatric/Behavioral:  Negative for agitation, behavioral problems, confusion, decreased concentration, dysphoric mood, hallucinations,  self-injury and suicidal ideas. The patient is not nervous/anxious and is not hyperactive.          Past Medical History:   Diagnosis Date    Anxiety     Asthma     Depression     Migraine     Multiple sclerosis     Dr Antonio     Past Surgical History:   Procedure Laterality Date     bilateral cervical paraspinous muscle trigger point injection Bilateral [id number]    D Shows  x 4     SECTION      FOOT SURGERY Left     left toe fusion    HYSTERECTOMY      INJECTION OF ANESTHETIC AGENT AROUND MEDIAL BRANCH NERVES INNERVATING LUMBAR FACET JOINT Bilateral 2022    Procedure: BLOCK, NERVE, FACET JOINT, LUMBAR, MEDIAL BRANCH;  Surgeon: Twyla Gaston MD;  Location: [facility];  Service: Pain Management;  Laterality: Bilateral;    INJECTION OF ANESTHETIC AGENT AROUND MEDIAL BRANCH NERVES INNERVATING LUMBAR FACET JOINT Bilateral 11/10/2022    Procedure: Block-nerve-medial branch-lumbar, bilateral L4 through S1;  Surgeon: Twyla Gaston MD;  Location: [facility];  Service: Pain Management;  Laterality: Bilateral;  patient will have to be tested    KNEE CARTILAGE SURGERY Right     LUMBAR PUNCTURE      Dr Trejo    RADIOFREQUENCY ABLATION OF LUMBAR MEDIAL BRANCH NERVE AT SINGLE LEVEL Right 12/15/2022    Procedure: Radiofrequency Ablation, Nerve, Spinal, Lumbar, Medial Branch, Level L4-S1;  Surgeon: Twyla Gaston MD;  Location: [facility];  Service: Pain Management;  Laterality: Right;  cong left after right side is done    RADIOFREQUENCY ABLATION OF LUMBAR MEDIAL BRANCH NERVE AT SINGLE LEVEL Left 2023    Procedure: RADIOFREQUENCY ABLATION, NERVE, SPINAL, LUMBAR, MEDIAL BRANCH, 1 LEVEL;  Surgeon: Twyla Gaston MD;  Location: [facility];  Service: Pain Management;  Laterality: Left;  Left L4-S1 RFTC. Pt had right on 12/15    Right C3-C7 FI Right 2018    Dr Trejo    TUBAL LIGATION      VAGINAL DELIVERY       Social History     Socioeconomic History    Marital status:  "Single   Tobacco Use    Smoking status: Never    Smokeless tobacco: Never   Substance and Sexual Activity    Alcohol use: Never    Drug use: Never     Family History   Problem Relation Age of Onset    Hypertension Father     Asthma Father     Hypertension Paternal Grandfather     Heart attack Paternal Grandfather      Review of patient's allergies indicates:   Allergen Reactions    Latex, natural rubber         Objective:  Vitals:    01/30/23 1413 01/30/23 1415   BP: (!) 135/92    Pulse: 100    Resp: 18    Weight: 69.4 kg (153 lb)    Height: 5' 3" (1.6 m)    PainSc:   5   5         Physical Exam  Vitals and nursing note reviewed. Exam conducted with a chaperone present.   Constitutional:       General: She is awake. She is not in acute distress.     Appearance: Normal appearance. She is not ill-appearing or diaphoretic.   HENT:      Head: Normocephalic and atraumatic.      Nose: Nose normal.      Mouth/Throat:      Mouth: Mucous membranes are moist.      Pharynx: Oropharynx is clear.   Eyes:      Conjunctiva/sclera: Conjunctivae normal.      Pupils: Pupils are equal, round, and reactive to light.   Cardiovascular:      Rate and Rhythm: Normal rate.   Pulmonary:      Effort: Pulmonary effort is normal. No respiratory distress.   Abdominal:      Palpations: Abdomen is soft.      Tenderness: There is no guarding.   Musculoskeletal:         General: Normal range of motion.      Cervical back: Normal range of motion and neck supple. Tenderness present. No rigidity.      Thoracic back: Tenderness present.      Lumbar back: Tenderness present.   Skin:     General: Skin is warm and dry.      Coloration: Skin is not jaundiced or pale.   Neurological:      General: No focal deficit present.      Mental Status: She is alert and oriented to person, place, and time. Mental status is at baseline.      Cranial Nerves: No cranial nerve deficit (II-XII).   Psychiatric:         Mood and Affect: Mood normal.         Behavior: Behavior " normal. Behavior is cooperative.         Thought Content: Thought content normal.         FL Fluoro for Pain Management  See OP Notes for results.     IMPRESSION: See OP Notes for results.     This procedure was auto-finalized by: Virtual Radiologist         Lab Visit on 2022   Component Date Value Ref Range Status    SARS CoV-2 PCR 2022 Negative  Negative, Invalid Final   Office Visit on 10/20/2022   Component Date Value Ref Range Status    See Scanned Report 10/20/2022 See Scanned Result   Final   Lab Visit on 2022   Component Date Value Ref Range Status    COVID-19 Ag 2022 Negative  Negative, Invalid Final   Lab Visit on 2022   Component Date Value Ref Range Status    SARS CoV-2 PCR 2022 Negative  Negative, Invalid Final         Orders Placed This Encounter   Procedures    POCT Urine Drug Screen Presump     Interpretive Information:     Negative:  No drug detected at the cut off level.   Positive:  This result represents presumptive positive for the   tested drug, other substances may yield a positive response other   than the analyte of interest. This result should be utilized for   diagnostic purpose only. Confirmation testing will be performed upon physician request only.              Requested Prescriptions     Signed Prescriptions Disp Refills    baclofen (LIORESAL) 10 MG tablet 90 tablet 2     Sig: Take 1 tablet (10 mg total) by mouth 3 (three) times daily.    tiZANidine 4 mg Cap 60 capsule 2     Sig: Take 1 capsule by mouth every 12 (twelve) hours.    HYDROcodone-acetaminophen (NORCO)  mg per tablet 90 tablet 0     Sig: Take 1 tablet by mouth every 8 (eight) hours.    naloxone (NARCAN) 4 mg/actuation Spry 2 each 0     Si sprays (8 mg total) by Nasal route once. Call 911, Two sprays alternate nostril, may repeat 2-3 minutes as needed for 1 dose    diclofenac sodium (VOLTAREN ARTHRITIS PAIN) 1 % Gel 10 each 2     Sig: Apply 2 g topically 2 (two) times daily.  Apply 2 grams BID to knees, elbows, hips joints bilaterally as needed    HYDROcodone-acetaminophen (NORCO)  mg per tablet 90 tablet 0     Sig: Take 1 tablet by mouth every 8 (eight) hours.    HYDROcodone-acetaminophen (NORCO)  mg per tablet 90 tablet 0     Sig: Take 1 tablet by mouth every 8 (eight) hours.       Assessment:     1. Multiple sclerosis    2. Cervical radiculopathy    3. Lumbosacral spondylosis without myelopathy    4. Polyarthralgia    5. Encounter for long-term (current) use of other medications         A's of Opioid Risk Assessment  Activity:Patient can perform ADL.   Analgesia:Patients pain is partially controlled by current medication. Patient has tried OTC medications such as Tylenol and Ibuprofen with out relief.   Adverse Effects: Patient denies constipation or sedation.  Aberrant Behavior:  reviewed with no aberrant drug seeking/taking behavior.  Overdose reversal drug naloxone discussed    Drug screen reviewed      X-ray lumbar spine [facility] May 2021 multiple level degenerative changes no fracture noted    MRI [facility] lumbar spine degenerative changes multiple levels will place report under media    Plan:    Narcan January 2023    Serum drug screen October 20, 2022 consistent with hydrocodone    Follows Neurology multiple sclerosis [facility]     Follow-up after lumbar L4 through S1 bilateral , right side January 5, left side December 15, 2022, she states she would 60% relief after procedure, she states the procedure did help increase her level of function     Chronic neck and joint pain    Considering cervical spine trigger point injections     Requesting refill Voltaren gel    Requesting Toradol injection    Toradol 60 mg IM, tolerated well    Continue current medication    Follow-up 3 months     Dr. Gaston, January 2024    Bring original prescription medication bottles/container/box with labels to each visit    Pill count    Physical therapy [facility] 2022    Massage therapy  declined

## 2023-01-30 ENCOUNTER — OFFICE VISIT (OUTPATIENT)
Dept: PAIN MEDICINE | Facility: CLINIC | Age: 43
End: 2023-01-30
Payer: COMMERCIAL

## 2023-01-30 VITALS
HEART RATE: 100 BPM | BODY MASS INDEX: 27.11 KG/M2 | SYSTOLIC BLOOD PRESSURE: 135 MMHG | HEIGHT: 63 IN | DIASTOLIC BLOOD PRESSURE: 92 MMHG | RESPIRATION RATE: 18 BRPM | WEIGHT: 153 LBS

## 2023-01-30 DIAGNOSIS — Z79.899 ENCOUNTER FOR LONG-TERM (CURRENT) USE OF OTHER MEDICATIONS: ICD-10-CM

## 2023-01-30 DIAGNOSIS — M54.12 CERVICAL RADICULOPATHY: Chronic | ICD-10-CM

## 2023-01-30 DIAGNOSIS — G35 MULTIPLE SCLEROSIS: Primary | Chronic | ICD-10-CM

## 2023-01-30 DIAGNOSIS — M25.50 POLYARTHRALGIA: Chronic | ICD-10-CM

## 2023-01-30 DIAGNOSIS — M47.817 LUMBOSACRAL SPONDYLOSIS WITHOUT MYELOPATHY: Chronic | ICD-10-CM

## 2023-01-30 PROCEDURE — 3075F PR MOST RECENT SYSTOLIC BLOOD PRESS GE 130-139MM HG: ICD-10-PCS | Mod: CPTII,,, | Performed by: PHYSICIAN ASSISTANT

## 2023-01-30 PROCEDURE — 99214 OFFICE O/P EST MOD 30 MIN: CPT | Mod: S$PBB,25,, | Performed by: PHYSICIAN ASSISTANT

## 2023-01-30 PROCEDURE — 3080F PR MOST RECENT DIASTOLIC BLOOD PRESSURE >= 90 MM HG: ICD-10-PCS | Mod: CPTII,,, | Performed by: PHYSICIAN ASSISTANT

## 2023-01-30 PROCEDURE — 99214 PR OFFICE/OUTPT VISIT, EST, LEVL IV, 30-39 MIN: ICD-10-PCS | Mod: S$PBB,25,, | Performed by: PHYSICIAN ASSISTANT

## 2023-01-30 PROCEDURE — 1159F MED LIST DOCD IN RCRD: CPT | Mod: CPTII,,, | Performed by: PHYSICIAN ASSISTANT

## 2023-01-30 PROCEDURE — 3075F SYST BP GE 130 - 139MM HG: CPT | Mod: CPTII,,, | Performed by: PHYSICIAN ASSISTANT

## 2023-01-30 PROCEDURE — 80305 DRUG TEST PRSMV DIR OPT OBS: CPT | Mod: PBBFAC | Performed by: PHYSICIAN ASSISTANT

## 2023-01-30 PROCEDURE — 1159F PR MEDICATION LIST DOCUMENTED IN MEDICAL RECORD: ICD-10-PCS | Mod: CPTII,,, | Performed by: PHYSICIAN ASSISTANT

## 2023-01-30 PROCEDURE — 3008F BODY MASS INDEX DOCD: CPT | Mod: CPTII,,, | Performed by: PHYSICIAN ASSISTANT

## 2023-01-30 PROCEDURE — 3008F PR BODY MASS INDEX (BMI) DOCUMENTED: ICD-10-PCS | Mod: CPTII,,, | Performed by: PHYSICIAN ASSISTANT

## 2023-01-30 PROCEDURE — 99214 OFFICE O/P EST MOD 30 MIN: CPT | Mod: PBBFAC,25 | Performed by: PHYSICIAN ASSISTANT

## 2023-01-30 PROCEDURE — 3080F DIAST BP >= 90 MM HG: CPT | Mod: CPTII,,, | Performed by: PHYSICIAN ASSISTANT

## 2023-01-30 PROCEDURE — 96372 THER/PROPH/DIAG INJ SC/IM: CPT | Mod: PBBFAC | Performed by: PHYSICIAN ASSISTANT

## 2023-01-30 RX ORDER — HYDROCODONE BITARTRATE AND ACETAMINOPHEN 10; 325 MG/1; MG/1
1 TABLET ORAL EVERY 8 HOURS
Qty: 90 TABLET | Refills: 0 | Status: SHIPPED | OUTPATIENT
Start: 2023-03-31 | End: 2023-05-04 | Stop reason: SDUPTHER

## 2023-01-30 RX ORDER — NALOXONE HYDROCHLORIDE 4 MG/.1ML
2 SPRAY NASAL ONCE
Qty: 2 EACH | Refills: 0 | Status: SHIPPED | OUTPATIENT
Start: 2023-01-30 | End: 2023-01-30

## 2023-01-30 RX ORDER — HYDROCODONE BITARTRATE AND ACETAMINOPHEN 10; 325 MG/1; MG/1
1 TABLET ORAL EVERY 8 HOURS
Qty: 90 TABLET | Refills: 0 | Status: SHIPPED | OUTPATIENT
Start: 2023-03-01 | End: 2023-03-31

## 2023-01-30 RX ORDER — TIZANIDINE HYDROCHLORIDE 4 MG/1
1 CAPSULE, GELATIN COATED ORAL EVERY 12 HOURS
Qty: 60 CAPSULE | Refills: 2 | Status: SHIPPED | OUTPATIENT
Start: 2023-01-30 | End: 2023-05-04 | Stop reason: SDUPTHER

## 2023-01-30 RX ORDER — HYDROCODONE BITARTRATE AND ACETAMINOPHEN 10; 325 MG/1; MG/1
1 TABLET ORAL EVERY 8 HOURS
Qty: 90 TABLET | Refills: 0 | Status: SHIPPED | OUTPATIENT
Start: 2023-01-30 | End: 2023-03-01

## 2023-01-30 RX ORDER — KETOROLAC TROMETHAMINE 30 MG/ML
60 INJECTION, SOLUTION INTRAMUSCULAR; INTRAVENOUS
Status: COMPLETED | OUTPATIENT
Start: 2023-01-30 | End: 2023-01-30

## 2023-01-30 RX ORDER — BACLOFEN 10 MG/1
10 TABLET ORAL 3 TIMES DAILY
Qty: 90 TABLET | Refills: 2 | Status: SHIPPED | OUTPATIENT
Start: 2023-01-30 | End: 2023-05-04 | Stop reason: SDUPTHER

## 2023-01-30 RX ORDER — DICLOFENAC SODIUM 10 MG/G
2 GEL TOPICAL 2 TIMES DAILY
Qty: 10 EACH | Refills: 2 | Status: SHIPPED | OUTPATIENT
Start: 2023-01-30

## 2023-01-30 RX ADMIN — KETOROLAC TROMETHAMINE 60 MG: 30 INJECTION, SOLUTION INTRAMUSCULAR at 02:01

## 2023-02-06 ENCOUNTER — OFFICE VISIT (OUTPATIENT)
Dept: OTOLARYNGOLOGY | Facility: CLINIC | Age: 43
End: 2023-02-06
Payer: COMMERCIAL

## 2023-02-06 VITALS — WEIGHT: 153 LBS | HEIGHT: 63 IN | BODY MASS INDEX: 27.11 KG/M2

## 2023-02-06 DIAGNOSIS — J31.0 CHRONIC RHINITIS: ICD-10-CM

## 2023-02-06 DIAGNOSIS — H90.2 CONDUCTIVE HEARING LOSS, UNSPECIFIED LATERALITY: ICD-10-CM

## 2023-02-06 DIAGNOSIS — H65.23 CHRONIC SEROUS OTITIS MEDIA OF BOTH EARS: Primary | ICD-10-CM

## 2023-02-06 DIAGNOSIS — J30.1 SEASONAL ALLERGIC RHINITIS DUE TO POLLEN: ICD-10-CM

## 2023-02-06 DIAGNOSIS — J30.5 ALLERGIC RHINITIS DUE TO FOOD: ICD-10-CM

## 2023-02-06 PROCEDURE — 3008F BODY MASS INDEX DOCD: CPT | Mod: CPTII,,, | Performed by: OTOLARYNGOLOGY

## 2023-02-06 PROCEDURE — 99204 PR OFFICE/OUTPT VISIT, NEW, LEVL IV, 45-59 MIN: ICD-10-PCS | Mod: S$PBB,,, | Performed by: OTOLARYNGOLOGY

## 2023-02-06 PROCEDURE — 1160F RVW MEDS BY RX/DR IN RCRD: CPT | Mod: CPTII,,, | Performed by: OTOLARYNGOLOGY

## 2023-02-06 PROCEDURE — 1160F PR REVIEW ALL MEDS BY PRESCRIBER/CLIN PHARMACIST DOCUMENTED: ICD-10-PCS | Mod: CPTII,,, | Performed by: OTOLARYNGOLOGY

## 2023-02-06 PROCEDURE — 3008F PR BODY MASS INDEX (BMI) DOCUMENTED: ICD-10-PCS | Mod: CPTII,,, | Performed by: OTOLARYNGOLOGY

## 2023-02-06 PROCEDURE — 1159F PR MEDICATION LIST DOCUMENTED IN MEDICAL RECORD: ICD-10-PCS | Mod: CPTII,,, | Performed by: OTOLARYNGOLOGY

## 2023-02-06 PROCEDURE — 99204 OFFICE O/P NEW MOD 45 MIN: CPT | Mod: S$PBB,,, | Performed by: OTOLARYNGOLOGY

## 2023-02-06 PROCEDURE — 99213 OFFICE O/P EST LOW 20 MIN: CPT | Mod: PBBFAC | Performed by: OTOLARYNGOLOGY

## 2023-02-06 PROCEDURE — 1159F MED LIST DOCD IN RCRD: CPT | Mod: CPTII,,, | Performed by: OTOLARYNGOLOGY

## 2023-02-06 NOTE — H&P (VIEW-ONLY)
Subjective:       Patient ID: Pili Love is a 42 y.o. female.    Chief Complaint: Follow-up (Patient presents with ear pain fluid for years ear pain )  Also some allergy symptoms   Follow-up  Associated symptoms include congestion.   Review of Systems   HENT:  Positive for congestion, ear pain, hearing loss and postnasal drip.    All other systems reviewed and are negative.    Objective:      Physical Exam  General: NAD some tmj pain   Head: Normocephalic, atraumatic, no facial asymmetry/normal strength,  Ears: Both auricules normal in appearance, w/o deformities tympanic membranes fluid right left retracted TG TYPE As bilaterally external auditory canals normal  Nose: External nose w/o deformities normal turbinates no drainage or inflammation  Oral Cavity: Lips, gums, floor of mouth, tongue hard palate, and buccal mucosa without mass/lesion  Oropharynx: Mucosa pink and moist, soft palate, posterior pharynx and oropharyngeal wall without mass/lesion  Neck: Supple, symmetric, trachea midline, no palpable mass/lesion, no palpable cervical lymphadenopathy  Skin: Warm and dry, no concerning lesions  Respiratory: Respirations even, unlabored   Assessment:       1. Chronic serous otitis media of both ears    2. Conductive hearing loss, unspecified laterality    3. Seasonal allergic rhinitis due to pollen        Plan:       Bilateral tubes in OR   RAST for allergies

## 2023-02-14 DIAGNOSIS — J31.0 CHRONIC RHINITIS: ICD-10-CM

## 2023-02-14 DIAGNOSIS — H65.23 CHRONIC SEROUS OTITIS MEDIA OF BOTH EARS: Primary | ICD-10-CM

## 2023-02-14 DIAGNOSIS — J30.1 SEASONAL ALLERGIC RHINITIS DUE TO POLLEN: ICD-10-CM

## 2023-02-14 DIAGNOSIS — J30.5 ALLERGIC RHINITIS DUE TO FOOD: ICD-10-CM

## 2023-02-14 RX ORDER — EPINEPHRINE 0.3 MG/.3ML
1 INJECTION SUBCUTANEOUS ONCE
Qty: 0.3 ML | Refills: 0 | Status: SHIPPED | OUTPATIENT
Start: 2023-02-14 | End: 2023-05-04

## 2023-02-15 DIAGNOSIS — J30.5 ALLERGIC RHINITIS DUE TO FOOD: ICD-10-CM

## 2023-02-15 DIAGNOSIS — J30.1 SEASONAL ALLERGIC RHINITIS DUE TO POLLEN: ICD-10-CM

## 2023-02-15 DIAGNOSIS — J31.0 CHRONIC RHINITIS: Primary | ICD-10-CM

## 2023-02-17 ENCOUNTER — HOSPITAL ENCOUNTER (OUTPATIENT)
Facility: HOSPITAL | Age: 43
Discharge: HOME | End: 2023-02-17
Attending: OTOLARYNGOLOGY | Admitting: OTOLARYNGOLOGY
Payer: COMMERCIAL

## 2023-02-17 ENCOUNTER — ANESTHESIA (OUTPATIENT)
Dept: SURGERY | Facility: HOSPITAL | Age: 43
End: 2023-02-17
Payer: COMMERCIAL

## 2023-02-17 ENCOUNTER — ANESTHESIA EVENT (OUTPATIENT)
Dept: SURGERY | Facility: HOSPITAL | Age: 43
End: 2023-02-17
Payer: COMMERCIAL

## 2023-02-17 VITALS
BODY MASS INDEX: 27.64 KG/M2 | HEART RATE: 85 BPM | HEIGHT: 63 IN | SYSTOLIC BLOOD PRESSURE: 132 MMHG | OXYGEN SATURATION: 98 % | RESPIRATION RATE: 16 BRPM | WEIGHT: 156 LBS | DIASTOLIC BLOOD PRESSURE: 87 MMHG | TEMPERATURE: 98 F

## 2023-02-17 DIAGNOSIS — H65.23 BILATERAL CHRONIC SEROUS OTITIS MEDIA: Primary | ICD-10-CM

## 2023-02-17 DIAGNOSIS — H66.93 CHRONIC OTITIS MEDIA OF BOTH EARS: ICD-10-CM

## 2023-02-17 PROCEDURE — 71000033 HC RECOVERY, INTIAL HOUR: Performed by: OTOLARYNGOLOGY

## 2023-02-17 PROCEDURE — D9220A PRA ANESTHESIA: ICD-10-PCS | Mod: ANES,,, | Performed by: ANESTHESIOLOGY

## 2023-02-17 PROCEDURE — 71000015 HC POSTOP RECOV 1ST HR: Performed by: OTOLARYNGOLOGY

## 2023-02-17 PROCEDURE — 69436 CREATE EARDRUM OPENING: CPT | Mod: 50,,, | Performed by: OTOLARYNGOLOGY

## 2023-02-17 PROCEDURE — 69436 PR CREATE EARDRUM OPENING,GEN ANESTH: ICD-10-PCS | Mod: 50,,, | Performed by: OTOLARYNGOLOGY

## 2023-02-17 PROCEDURE — 27201423 OPTIME MED/SURG SUP & DEVICES STERILE SUPPLY: Performed by: OTOLARYNGOLOGY

## 2023-02-17 PROCEDURE — D9220A PRA ANESTHESIA: Mod: ANES,,, | Performed by: ANESTHESIOLOGY

## 2023-02-17 PROCEDURE — 36000704 HC OR TIME LEV I 1ST 15 MIN: Performed by: OTOLARYNGOLOGY

## 2023-02-17 PROCEDURE — 25000003 PHARM REV CODE 250: Performed by: OTOLARYNGOLOGY

## 2023-02-17 PROCEDURE — 37000008 HC ANESTHESIA 1ST 15 MINUTES: Performed by: OTOLARYNGOLOGY

## 2023-02-17 PROCEDURE — 63600175 PHARM REV CODE 636 W HCPCS: Performed by: ANESTHESIOLOGY

## 2023-02-17 PROCEDURE — 63600175 PHARM REV CODE 636 W HCPCS

## 2023-02-17 PROCEDURE — D9220A PRA ANESTHESIA: ICD-10-PCS | Mod: CRNA,,,

## 2023-02-17 PROCEDURE — D9220A PRA ANESTHESIA: Mod: CRNA,,,

## 2023-02-17 DEVICE — IMPLANTABLE DEVICE: Type: IMPLANTABLE DEVICE | Site: EAR | Status: FUNCTIONAL

## 2023-02-17 RX ORDER — DIPHENHYDRAMINE HYDROCHLORIDE 50 MG/ML
25 INJECTION INTRAMUSCULAR; INTRAVENOUS EVERY 6 HOURS PRN
Status: DISCONTINUED | OUTPATIENT
Start: 2023-02-17 | End: 2023-02-17 | Stop reason: HOSPADM

## 2023-02-17 RX ORDER — PROPOFOL 10 MG/ML
VIAL (ML) INTRAVENOUS
Status: DISCONTINUED | OUTPATIENT
Start: 2023-02-17 | End: 2023-02-17

## 2023-02-17 RX ORDER — ONDANSETRON 2 MG/ML
INJECTION INTRAMUSCULAR; INTRAVENOUS
Status: DISCONTINUED | OUTPATIENT
Start: 2023-02-17 | End: 2023-02-17

## 2023-02-17 RX ORDER — MIDAZOLAM HYDROCHLORIDE 1 MG/ML
INJECTION INTRAMUSCULAR; INTRAVENOUS
Status: DISCONTINUED | OUTPATIENT
Start: 2023-02-17 | End: 2023-02-17

## 2023-02-17 RX ORDER — MORPHINE SULFATE 10 MG/ML
4 INJECTION INTRAMUSCULAR; INTRAVENOUS; SUBCUTANEOUS EVERY 5 MIN PRN
Status: DISCONTINUED | OUTPATIENT
Start: 2023-02-17 | End: 2023-02-17 | Stop reason: HOSPADM

## 2023-02-17 RX ORDER — HYDROMORPHONE HYDROCHLORIDE 2 MG/ML
0.5 INJECTION, SOLUTION INTRAMUSCULAR; INTRAVENOUS; SUBCUTANEOUS EVERY 5 MIN PRN
Status: DISCONTINUED | OUTPATIENT
Start: 2023-02-17 | End: 2023-02-17 | Stop reason: HOSPADM

## 2023-02-17 RX ORDER — ONDANSETRON 2 MG/ML
4 INJECTION INTRAMUSCULAR; INTRAVENOUS DAILY PRN
Status: DISCONTINUED | OUTPATIENT
Start: 2023-02-17 | End: 2023-02-17 | Stop reason: HOSPADM

## 2023-02-17 RX ORDER — CIPROFLOXACIN AND DEXAMETHASONE 3; 1 MG/ML; MG/ML
SUSPENSION/ DROPS AURICULAR (OTIC)
Status: DISCONTINUED | OUTPATIENT
Start: 2023-02-17 | End: 2023-02-17 | Stop reason: HOSPADM

## 2023-02-17 RX ORDER — MEPERIDINE HYDROCHLORIDE 25 MG/ML
25 INJECTION INTRAMUSCULAR; INTRAVENOUS; SUBCUTANEOUS EVERY 10 MIN PRN
Status: DISCONTINUED | OUTPATIENT
Start: 2023-02-17 | End: 2023-02-17 | Stop reason: HOSPADM

## 2023-02-17 RX ADMIN — SODIUM CHLORIDE, POTASSIUM CHLORIDE, SODIUM LACTATE AND CALCIUM CHLORIDE: 600; 310; 30; 20 INJECTION, SOLUTION INTRAVENOUS at 08:02

## 2023-02-17 RX ADMIN — PROPOFOL 50 MG: 10 INJECTION, EMULSION INTRAVENOUS at 08:02

## 2023-02-17 RX ADMIN — PROPOFOL 100 MG: 10 INJECTION, EMULSION INTRAVENOUS at 08:02

## 2023-02-17 RX ADMIN — ONDANSETRON 4 MG: 2 INJECTION INTRAMUSCULAR; INTRAVENOUS at 08:02

## 2023-02-17 RX ADMIN — MEPERIDINE HYDROCHLORIDE 25 MG: 25 INJECTION INTRAMUSCULAR; INTRAVENOUS; SUBCUTANEOUS at 08:02

## 2023-02-17 RX ADMIN — MIDAZOLAM 2 MG: 1 INJECTION INTRAMUSCULAR; INTRAVENOUS at 08:02

## 2023-02-17 NOTE — OP NOTE
Surgery Date: 2/17/2023     Surgeon(s) and Role:     * Gagan Francis MD - Primary    Assisting Surgeon: None    Pre-op Diagnosis:  Chronic serous otitis media of both ears [H65.23]    Post-op Diagnosis:  Post-Op Diagnosis Codes:     * Chronic serous otitis media of both ears [H65.23]    Procedure(s) (LRB):  MYRINGOTOMY, WITH TYMPANOSTOMY TUBE INSERTION (Bilateral)    After general mask anesthesia using the operating microscope the left ear was examined and a myringotomy was performed in the anterior inferior quadrant and a grommet tube was placed without difficulty excess middle ear  fluid was suctioned. The opposite ear was done in a likewise fashion the patient tolerated the procedure well and was reversed taken to RR in stable condition            Anesthesia: General    Operative Findings: Fluid    Estimated Blood Loss: 0

## 2023-02-17 NOTE — OR NURSING
0823 Rec'd pt to PACU asleep with no distress noted, respirations even and unlabored. VSS. Bilateral ear dressings C/D/I. No needs at this time. Will continue to monitor.     0835 Pt c/o discomfort to bilateral ears. IV demerol given, see MAR for admin.     0856 Out of PACU. VSS. No signs of bleeding/distress noted.     0900 Pt to ASC 19 awake and alert with no distress noted, respirations even and unlabored. Family at bedside. Bedside report given to KEE Armas RN. Bilateral ear dressings C/D/I. Pt states pain improving. Denies other needs. /87, P 84, R 16, O2 98% room air.

## 2023-02-17 NOTE — ANESTHESIA PREPROCEDURE EVALUATION
02/17/2023  Pili Love is a 42 y.o., female.      Pre-op Assessment    I have reviewed the Patient Summary Reports.    I have reviewed the NPO Status.   I have reviewed the Medications.     Review of Systems         Anesthesia Plan  Type of Anesthesia, risks & benefits discussed:    Anesthesia Type: Gen Supraglottic Airway  Intra-op Monitoring Plan: Standard ASA Monitors  Post Op Pain Control Plan: IV/PO Opioids PRN  Induction:  IV  Informed Consent: Informed consent signed with the Patient and all parties understand the risks and agree with anesthesia plan.  All questions answered.   ASA Score: 3    Ready For Surgery From Anesthesia Perspective.     .  NPO greater than 8 hours  NAC  NKDA    Medical History   Asthma Migraine   Anxiety Depression   Multiple sclerosis    Previous hysterectomy    Airway exam deferred (COVID precautions); adequate ROM at neck.

## 2023-02-17 NOTE — TRANSFER OF CARE
"Anesthesia Transfer of Care Note    Patient: Pili Love    Procedure(s) Performed: Procedure(s) (LRB):  MYRINGOTOMY, WITH TYMPANOSTOMY TUBE INSERTION (Bilateral)    Patient location: PACU    Anesthesia Type: MAC    Transport from OR: Transported from OR on room air with adequate spontaneous ventilation    Post pain: adequate analgesia    Post assessment: no apparent anesthetic complications    Post vital signs: stable    Level of consciousness: responds to stimulation    Nausea/Vomiting: no nausea/vomiting    Complications: none    Transfer of care protocol was followed      Last vitals:   Visit Vitals  /89   Pulse 96   Temp 36.7 °C (98.1 °F)   Resp 16   Ht 5' 3" (1.6 m)   Wt 70.8 kg (156 lb)   SpO2 97%   Breastfeeding No   BMI 27.63 kg/m²     "

## 2023-03-29 ENCOUNTER — OFFICE VISIT (OUTPATIENT)
Dept: OTOLARYNGOLOGY | Facility: CLINIC | Age: 43
End: 2023-03-29
Payer: COMMERCIAL

## 2023-03-29 VITALS — WEIGHT: 156 LBS | HEIGHT: 63 IN | BODY MASS INDEX: 27.64 KG/M2

## 2023-03-29 DIAGNOSIS — H65.23 CHRONIC SEROUS OTITIS MEDIA OF BOTH EARS: Primary | ICD-10-CM

## 2023-03-29 PROCEDURE — 3008F PR BODY MASS INDEX (BMI) DOCUMENTED: ICD-10-PCS | Mod: CPTII,,, | Performed by: OTOLARYNGOLOGY

## 2023-03-29 PROCEDURE — 99213 OFFICE O/P EST LOW 20 MIN: CPT | Mod: PBBFAC | Performed by: OTOLARYNGOLOGY

## 2023-03-29 PROCEDURE — 99213 PR OFFICE/OUTPT VISIT, EST, LEVL III, 20-29 MIN: ICD-10-PCS | Mod: S$PBB,,, | Performed by: OTOLARYNGOLOGY

## 2023-03-29 PROCEDURE — 1159F PR MEDICATION LIST DOCUMENTED IN MEDICAL RECORD: ICD-10-PCS | Mod: CPTII,,, | Performed by: OTOLARYNGOLOGY

## 2023-03-29 PROCEDURE — 1160F RVW MEDS BY RX/DR IN RCRD: CPT | Mod: CPTII,,, | Performed by: OTOLARYNGOLOGY

## 2023-03-29 PROCEDURE — 99213 OFFICE O/P EST LOW 20 MIN: CPT | Mod: S$PBB,,, | Performed by: OTOLARYNGOLOGY

## 2023-03-29 PROCEDURE — 1159F MED LIST DOCD IN RCRD: CPT | Mod: CPTII,,, | Performed by: OTOLARYNGOLOGY

## 2023-03-29 PROCEDURE — 3008F BODY MASS INDEX DOCD: CPT | Mod: CPTII,,, | Performed by: OTOLARYNGOLOGY

## 2023-03-29 PROCEDURE — 1160F PR REVIEW ALL MEDS BY PRESCRIBER/CLIN PHARMACIST DOCUMENTED: ICD-10-PCS | Mod: CPTII,,, | Performed by: OTOLARYNGOLOGY

## 2023-03-29 NOTE — PROGRESS NOTES
Subjective     Patient ID: Pili Love is a 42 y.o. female.    Chief Complaint: Follow-up (Patient is a post op follow up. She is doing well. )    HPI  Review of Systems   Constitutional:  Negative for chills, fatigue and fever.   HENT:  Negative for ear discharge, ear pain and hearing loss.         Objective     Physical Exam  Constitutional:       Appearance: Normal appearance.   HENT:      Head: Normocephalic.      Right Ear: Tympanic membrane, ear canal and external ear normal. A PE tube is present.      Left Ear: Tympanic membrane, ear canal and external ear normal. A PE tube is present.      Nose: Nose normal.      Mouth/Throat:      Mouth: Mucous membranes are moist.      Pharynx: Oropharynx is clear.   Eyes:      Pupils: Pupils are equal, round, and reactive to light.   Pulmonary:      Effort: Pulmonary effort is normal.   Skin:     General: Skin is warm and dry.   Neurological:      Mental Status: She is alert and oriented to person, place, and time.   Psychiatric:         Mood and Affect: Mood normal.         Behavior: Behavior is cooperative.          Assessment and Plan     Problem List Items Addressed This Visit    None  Visit Diagnoses       Chronic serous otitis media of both ears    -  Primary            Follow up in 3 mos for tube check.

## 2023-04-27 ENCOUNTER — CLINICAL SUPPORT (OUTPATIENT)
Dept: OTOLARYNGOLOGY | Facility: CLINIC | Age: 43
End: 2023-04-27
Payer: COMMERCIAL

## 2023-04-27 DIAGNOSIS — J30.1 ALLERGIC RHINITIS DUE TO GRASS POLLEN: ICD-10-CM

## 2023-04-27 DIAGNOSIS — J30.1 SEASONAL ALLERGIC RHINITIS DUE TO POLLEN: ICD-10-CM

## 2023-04-27 DIAGNOSIS — J30.1 ALLERGIC REACTION TO GRASS POLLEN: ICD-10-CM

## 2023-04-27 DIAGNOSIS — J30.89 ALLERGIC RHINITIS DUE TO INSECT: ICD-10-CM

## 2023-04-27 DIAGNOSIS — J30.5 ALLERGIC RHINITIS DUE TO FOOD: ICD-10-CM

## 2023-04-27 DIAGNOSIS — J30.89 OTHER ALLERGIC RHINITIS: ICD-10-CM

## 2023-04-27 DIAGNOSIS — J31.0 CHRONIC RHINITIS: Primary | ICD-10-CM

## 2023-04-27 PROCEDURE — 95165 ANTIGEN THERAPY SERVICES: CPT | Mod: S$PBB,,, | Performed by: OTOLARYNGOLOGY

## 2023-04-27 PROCEDURE — 95115 IMMUNOTHERAPY ONE INJECTION: CPT | Mod: PBBFAC | Performed by: OTOLARYNGOLOGY

## 2023-04-27 PROCEDURE — 95165 PR PROFES SVC,IMMUNOTHER,SINGLE/MULT AGS: ICD-10-PCS | Mod: S$PBB,,, | Performed by: OTOLARYNGOLOGY

## 2023-04-27 PROCEDURE — 95165 ANTIGEN THERAPY SERVICES: CPT | Mod: PBBFAC | Performed by: OTOLARYNGOLOGY

## 2023-04-27 NOTE — PROGRESS NOTES
Established patient with Dr. Francis. See previous note for written order. Allergy injections per Dr. Francis.   Vial test administered from new vial A.  10 minute vial test mm reactions  Administered left arm at 4:56 PM  4mm observed   First dose of 0.02ml given.  20 minute mm reaction  4:56 PM   left arm; observation 9mm.

## 2023-05-04 ENCOUNTER — CLINICAL SUPPORT (OUTPATIENT)
Dept: OTOLARYNGOLOGY | Facility: CLINIC | Age: 43
End: 2023-05-04
Payer: COMMERCIAL

## 2023-05-04 ENCOUNTER — OFFICE VISIT (OUTPATIENT)
Dept: PAIN MEDICINE | Facility: CLINIC | Age: 43
End: 2023-05-04
Payer: COMMERCIAL

## 2023-05-04 VITALS
SYSTOLIC BLOOD PRESSURE: 125 MMHG | BODY MASS INDEX: 23.46 KG/M2 | WEIGHT: 146 LBS | HEART RATE: 113 BPM | DIASTOLIC BLOOD PRESSURE: 76 MMHG | RESPIRATION RATE: 18 BRPM | HEIGHT: 66 IN

## 2023-05-04 DIAGNOSIS — J30.1 ALLERGIC RHINITIS DUE TO GRASS POLLEN: ICD-10-CM

## 2023-05-04 DIAGNOSIS — J30.1 SEASONAL ALLERGIC RHINITIS DUE TO POLLEN: ICD-10-CM

## 2023-05-04 DIAGNOSIS — J30.1 ALLERGIC REACTION TO GRASS POLLEN: ICD-10-CM

## 2023-05-04 DIAGNOSIS — J30.5 ALLERGIC RHINITIS DUE TO FOOD: ICD-10-CM

## 2023-05-04 DIAGNOSIS — M47.817 LUMBOSACRAL SPONDYLOSIS WITHOUT MYELOPATHY: Chronic | ICD-10-CM

## 2023-05-04 DIAGNOSIS — G35 MULTIPLE SCLEROSIS: Primary | Chronic | ICD-10-CM

## 2023-05-04 DIAGNOSIS — M54.12 CERVICAL RADICULOPATHY: Chronic | ICD-10-CM

## 2023-05-04 DIAGNOSIS — M79.10 MYALGIA: Chronic | ICD-10-CM

## 2023-05-04 DIAGNOSIS — J31.0 CHRONIC RHINITIS: Primary | ICD-10-CM

## 2023-05-04 DIAGNOSIS — Z79.899 ENCOUNTER FOR LONG-TERM (CURRENT) USE OF OTHER MEDICATIONS: ICD-10-CM

## 2023-05-04 DIAGNOSIS — M54.2 NECK PAIN: Chronic | ICD-10-CM

## 2023-05-04 DIAGNOSIS — J30.89 ALLERGIC RHINITIS DUE TO INSECT: ICD-10-CM

## 2023-05-04 DIAGNOSIS — J30.89 OTHER ALLERGIC RHINITIS: ICD-10-CM

## 2023-05-04 LAB
CTP QC/QA: YES
POC (AMP) AMPHETAMINE: NEGATIVE
POC (BAR) BARBITURATES: NEGATIVE
POC (BUP) BUPRENORPHINE: NEGATIVE
POC (BZO) BENZODIAZEPINES: NEGATIVE
POC (COC) COCAINE: NEGATIVE
POC (MDMA) METHYLENEDIOXYMETHAMPHETAMINE 3,4: NEGATIVE
POC (MET) METHAMPHETAMINE: NEGATIVE
POC (MOP) OPIATES: NEGATIVE
POC (MTD) METHADONE: NEGATIVE
POC (OXY) OXYCODONE: NEGATIVE
POC (PCP) PHENCYCLIDINE: NEGATIVE
POC (TCA) TRICYCLIC ANTIDEPRESSANTS: NEGATIVE
POC TEMPERATURE (URINE): 90
POC THC: NEGATIVE

## 2023-05-04 PROCEDURE — 3008F PR BODY MASS INDEX (BMI) DOCUMENTED: ICD-10-PCS | Mod: CPTII,,, | Performed by: PHYSICIAN ASSISTANT

## 2023-05-04 PROCEDURE — G0481 DRUG SCREEN DEFINITIVE 14, URINE: ICD-10-PCS | Mod: ,,, | Performed by: CLINICAL MEDICAL LABORATORY

## 2023-05-04 PROCEDURE — 3078F PR MOST RECENT DIASTOLIC BLOOD PRESSURE < 80 MM HG: ICD-10-PCS | Mod: CPTII,,, | Performed by: PHYSICIAN ASSISTANT

## 2023-05-04 PROCEDURE — 99214 OFFICE O/P EST MOD 30 MIN: CPT | Mod: PBBFAC | Performed by: PHYSICIAN ASSISTANT

## 2023-05-04 PROCEDURE — 1159F MED LIST DOCD IN RCRD: CPT | Mod: CPTII,,, | Performed by: PHYSICIAN ASSISTANT

## 2023-05-04 PROCEDURE — G0481 DRUG TEST DEF 8-14 CLASSES: HCPCS | Mod: ,,, | Performed by: CLINICAL MEDICAL LABORATORY

## 2023-05-04 PROCEDURE — 3074F PR MOST RECENT SYSTOLIC BLOOD PRESSURE < 130 MM HG: ICD-10-PCS | Mod: CPTII,,, | Performed by: PHYSICIAN ASSISTANT

## 2023-05-04 PROCEDURE — 3074F SYST BP LT 130 MM HG: CPT | Mod: CPTII,,, | Performed by: PHYSICIAN ASSISTANT

## 2023-05-04 PROCEDURE — 99214 OFFICE O/P EST MOD 30 MIN: CPT | Mod: S$PBB,,, | Performed by: PHYSICIAN ASSISTANT

## 2023-05-04 PROCEDURE — 95115 IMMUNOTHERAPY ONE INJECTION: CPT | Mod: PBBFAC | Performed by: OTOLARYNGOLOGY

## 2023-05-04 PROCEDURE — 80305 DRUG TEST PRSMV DIR OPT OBS: CPT | Mod: PBBFAC | Performed by: PHYSICIAN ASSISTANT

## 2023-05-04 PROCEDURE — 99214 PR OFFICE/OUTPT VISIT, EST, LEVL IV, 30-39 MIN: ICD-10-PCS | Mod: S$PBB,,, | Performed by: PHYSICIAN ASSISTANT

## 2023-05-04 PROCEDURE — 3008F BODY MASS INDEX DOCD: CPT | Mod: CPTII,,, | Performed by: PHYSICIAN ASSISTANT

## 2023-05-04 PROCEDURE — 3078F DIAST BP <80 MM HG: CPT | Mod: CPTII,,, | Performed by: PHYSICIAN ASSISTANT

## 2023-05-04 PROCEDURE — 1159F PR MEDICATION LIST DOCUMENTED IN MEDICAL RECORD: ICD-10-PCS | Mod: CPTII,,, | Performed by: PHYSICIAN ASSISTANT

## 2023-05-04 RX ORDER — TIZANIDINE HYDROCHLORIDE 4 MG/1
1 CAPSULE, GELATIN COATED ORAL EVERY 12 HOURS
Qty: 60 CAPSULE | Refills: 0 | Status: SHIPPED | OUTPATIENT
Start: 2023-05-04 | End: 2023-06-15 | Stop reason: SDUPTHER

## 2023-05-04 RX ORDER — BACLOFEN 10 MG/1
10 TABLET ORAL 3 TIMES DAILY
Qty: 90 TABLET | Refills: 0 | Status: SHIPPED | OUTPATIENT
Start: 2023-05-04 | End: 2023-06-15 | Stop reason: SDUPTHER

## 2023-05-04 RX ORDER — HYDROCODONE BITARTRATE AND ACETAMINOPHEN 10; 325 MG/1; MG/1
1 TABLET ORAL EVERY 8 HOURS
Qty: 90 TABLET | Refills: 0 | Status: SHIPPED | OUTPATIENT
Start: 2023-05-11 | End: 2023-06-15 | Stop reason: SDUPTHER

## 2023-05-04 NOTE — PROGRESS NOTES
Established patient with Dr. Francis. See previous note for written order. Allergy injections per Dr. Francis.   20 minute mm reaction  5:09 PM   right arm; observation 7mm

## 2023-05-04 NOTE — LETTER
May 4, 2023      [facility]  [address] [facility] [place] [address]  Phone: [phone]       Patient: Pili Love   YOB: 1980  Date of Visit: 05/04/2023    To Whom It May Concern:    GRABIEL Love  was at [facility] on 5/4/23. The patient may return to work/school on 5/5/23 no restrictions. If you have any questions or concerns, or if I can be of further assistance, please do not hesitate to contact me.    Sincerely,    Dalia Camargo LPN

## 2023-05-04 NOTE — PROGRESS NOTES
Subjective:         Patient ID: Pili Love is a 42 y.o. female.    Chief Complaint: Low-back Pain and Neck Pain        Pain  This is a chronic problem. The current episode started more than 1 year ago. The problem occurs daily. The problem has been unchanged. Associated symptoms include arthralgias, myalgias and neck pain. Pertinent negatives include no anorexia, chest pain, chills, coughing, diaphoresis, fatigue, fever, sore throat, vertigo or vomiting.   Review of Systems   Constitutional:  Negative for activity change, appetite change, chills, diaphoresis, fatigue and fever.   HENT:  Negative for drooling, ear discharge, ear pain, facial swelling, nosebleeds, sore throat, trouble swallowing, voice change and goiter.    Eyes:  Negative for photophobia, pain, discharge, redness and visual disturbance.   Respiratory:  Negative for apnea, cough, choking, chest tightness, shortness of breath, wheezing and stridor.    Cardiovascular:  Negative for chest pain, palpitations and leg swelling.   Gastrointestinal:  Negative for abdominal distention, anorexia, diarrhea, rectal pain, vomiting and fecal incontinence.   Endocrine: Negative for cold intolerance, heat intolerance, polydipsia, polyphagia and polyuria.   Genitourinary:  Negative for flank pain, frequency and hot flashes.   Musculoskeletal:  Positive for arthralgias, back pain, myalgias and neck pain.   Integumentary:  Negative for color change and pallor.   Allergic/Immunologic: Negative for immunocompromised state.   Neurological:  Negative for dizziness, vertigo, seizures, syncope, facial asymmetry, speech difficulty, light-headedness, coordination difficulties, memory loss and coordination difficulties.   Hematological:  Negative for adenopathy. Does not bruise/bleed easily.   Psychiatric/Behavioral:  Negative for agitation, behavioral problems, confusion, decreased concentration, dysphoric mood, hallucinations, self-injury and suicidal ideas. The patient  is not nervous/anxious and is not hyperactive.          Past Medical History:   Diagnosis Date    Anxiety     Asthma     Depression     Migraine     Multiple sclerosis     Dr Antonio     Past Surgical History:   Procedure Laterality Date     bilateral cervical paraspinous muscle trigger point injection Bilateral [id number]    D Shows  x 4     SECTION      FOOT SURGERY Left     left toe fusion    HYSTERECTOMY      INJECTION OF ANESTHETIC AGENT AROUND MEDIAL BRANCH NERVES INNERVATING LUMBAR FACET JOINT Bilateral 2022    Procedure: BLOCK, NERVE, FACET JOINT, LUMBAR, MEDIAL BRANCH;  Surgeon: Twyla Gaston MD;  Location: [facility];  Service: Pain Management;  Laterality: Bilateral;    INJECTION OF ANESTHETIC AGENT AROUND MEDIAL BRANCH NERVES INNERVATING LUMBAR FACET JOINT Bilateral 11/10/2022    Procedure: Block-nerve-medial branch-lumbar, bilateral L4 through S1;  Surgeon: Twyla Gaston MD;  Location: [facility];  Service: Pain Management;  Laterality: Bilateral;  patient will have to be tested    KNEE CARTILAGE SURGERY Right     LUMBAR PUNCTURE      Dr Trejo    MYRINGOTOMY WITH INSERTION OF VENTILATION TUBE Bilateral 2023    Procedure: MYRINGOTOMY, WITH TYMPANOSTOMY TUBE INSERTION;  Surgeon: Gagan Francis MD;  Location: [facility];  Service: ENT;  Laterality: Bilateral;    RADIOFREQUENCY ABLATION OF LUMBAR MEDIAL BRANCH NERVE AT SINGLE LEVEL Right 12/15/2022    Procedure: Radiofrequency Ablation, Nerve, Spinal, Lumbar, Medial Branch, Level L4-S1;  Surgeon: Twyla Gaston MD;  Location: [facility];  Service: Pain Management;  Laterality: Right;  cong left after right side is done    RADIOFREQUENCY ABLATION OF LUMBAR MEDIAL BRANCH NERVE AT SINGLE LEVEL Left 2023    Procedure: RADIOFREQUENCY ABLATION, NERVE, SPINAL, LUMBAR, MEDIAL BRANCH, 1 LEVEL;  Surgeon: Twyla Gaston MD;  Location: [facility];  Service: Pain Management;  Laterality: Left;  Left  "L4-S1 RFTC. Pt had right on 12/15    Right C3-C7 FI Right 04/17/2018    Dr Trejo    TUBAL LIGATION      VAGINAL DELIVERY       Social History     Socioeconomic History    Marital status: Single   Tobacco Use    Smoking status: Never    Smokeless tobacco: Never   Substance and Sexual Activity    Alcohol use: Never    Drug use: Never     Family History   Problem Relation Age of Onset    Hypertension Father     Asthma Father     Hypertension Paternal Grandfather     Heart attack Paternal Grandfather      Review of patient's allergies indicates:   Allergen Reactions    Cockroach     Grass pollen-bermuda, standard     Grass pollen-veronica, standard     Latex, natural rubber     Peanuts [peanut (legumes)] Other (See Comments)     Allergy testing    Wheat containing prod Other (See Comments)     Allergy testing        Objective:  Vitals:    05/04/23 1402   BP: 125/76   Pulse: (!) 113   Resp: 18   Weight: 66.2 kg (146 lb)   Height: 5' 6" (1.676 m)   PainSc:   7         Physical Exam  Vitals and nursing note reviewed. Exam conducted with a chaperone present.   Constitutional:       General: She is awake. She is not in acute distress.     Appearance: Normal appearance. She is not ill-appearing or diaphoretic.   HENT:      Head: Normocephalic and atraumatic.      Nose: Nose normal.      Mouth/Throat:      Mouth: Mucous membranes are moist.      Pharynx: Oropharynx is clear.   Eyes:      Conjunctiva/sclera: Conjunctivae normal.      Pupils: Pupils are equal, round, and reactive to light.   Cardiovascular:      Rate and Rhythm: Normal rate.   Pulmonary:      Effort: Pulmonary effort is normal. No respiratory distress.   Abdominal:      Palpations: Abdomen is soft.      Tenderness: There is no guarding.   Musculoskeletal:         General: Normal range of motion.      Cervical back: Normal range of motion and neck supple. Tenderness present. No rigidity.      Thoracic back: Tenderness present.      Lumbar back: Tenderness present. "   Skin:     General: Skin is warm and dry.      Coloration: Skin is not jaundiced or pale.   Neurological:      General: No focal deficit present.      Mental Status: She is alert and oriented to person, place, and time. Mental status is at baseline.      Cranial Nerves: No cranial nerve deficit (II-XII).   Psychiatric:         Mood and Affect: Mood normal.         Behavior: Behavior normal. Behavior is cooperative.         Thought Content: Thought content normal.         [place] Fluoro for Pain Management  See OP Notes for results.     IMPRESSION: See OP Notes for results.     This procedure was auto-finalized by: Virtual Radiologist         Abstract on 02/06/2023   Component Date Value Ref Range Status    House Dust Mites/D.P., IgE 02/06/2023 <0.35  kU/L Final    House Dust Mites/D.F., IgE 02/06/2023 <0.35  kU/L Final    Cat Epithelium, IgE 02/06/2023 <0.35  kU/L Final    Dog Dander, IgE 02/06/2023 <0.35  kU/L Final    [place] Grass, IgE 02/06/2023 3.90  kU/L Final    Billy Grass, IgE 02/06/2023 15.2  kU/L Final    Cockroach, IgE 02/06/2023 1.22  kU/L Final    Penicillium, IgE 02/06/2023 <0.35  kU/L Final    Cladosporium, IgE 02/06/2023 <0.35  kU/L Final    Aspergillus Fumigatus, IgE 02/06/2023 <0.35  kU/L Final    Alternaria Tenuis, IgE 02/06/2023 <0.35  kU/L Final    Box [place]/Maple, IgE 02/06/2023 <0.35  kU/L Final    Silver Birch, IgE 02/06/2023 <0.35  kU/L Final    [place], IgE 02/06/2023 <0.35  kU/L Final    Oak, IgE 02/06/2023 <0.35  kU/L Final    Elm, IgE 02/06/2023 <0.35  kU/L Final    [place] Tree, IgE 02/06/2023 <0.35  kU/L Final    Pecan [place], IgE 02/06/2023 <0.35  kU/L Final    [place], IgE 02/06/2023 <0.35  kU/L Final    Short Ragweed, IgE 02/06/2023 <0.35  kU/L Final    Rough Pigweed, IgE 02/06/2023 <0.35  kU/L Final    Rough Romero Elder, IgE 02/06/2023 <0.35  kU/L Final    Immunoglobulin E (IgE) 02/06/2023 61.4  <=214 kU/L Final    Goldenrod, IgE 02/06/2023 <0.35  kU/L Final    [place] Generic  Orderable 02/06/2023 SEE COMMENTS   Final    [place] Generic Orderable 02/06/2023 SEE COMMENTS   Final    [place] Generic Orderable 02/06/2023 SEE COMMENTS   Final    [place] Generic Orderable 02/06/2023 SEE COMMENTS   Final    [place] Generic Orderable 02/06/2023 SEE COMMENTS   Final    [place] Generic Orderable 02/06/2023 SEE COMMENTS   Final   Office Visit on 01/30/2023   Component Date Value Ref Range Status    POC Amphetamines 01/30/2023 Negative  Negative, Inconclusive Final    POC Barbiturates 01/30/2023 Negative  Negative, Inconclusive Final    POC Benzodiazepines 01/30/2023 Negative  Negative, Inconclusive Final    POC Cocaine 01/30/2023 Negative  Negative, Inconclusive Final    POC THC 01/30/2023 Negative  Negative, Inconclusive Final    POC Methadone 01/30/2023 Negative  Negative, Inconclusive Final    POC Methamphetamine 01/30/2023 Negative  Negative, Inconclusive Final    POC Opiates 01/30/2023 Presumptive Positive (A)  Negative, Inconclusive Final    POC Oxycodone 01/30/2023 Negative  Negative, Inconclusive Final    POC Phencyclidine 01/30/2023 Negative  Negative, Inconclusive Final    POC Methylenedioxymethamphetamine * 01/30/2023 Negative  Negative, Inconclusive Final    POC Tricyclic Antidepressants 01/30/2023 Negative  Negative, Inconclusive Final    POC Buprenorphine 01/30/2023 Negative   Final     Acceptable 01/30/2023 Yes   Final    POC Temperature (Urine) 01/30/2023 92   Final   Lab Visit on 11/08/2022   Component Date Value Ref Range Status    SARS CoV-2 PCR 11/08/2022 Negative  Negative, Invalid Final         Orders Placed This Encounter   Procedures    Drug Screen Definitive 14, Urine     Standing Status:   Future     Number of Occurrences:   1     Standing Expiration Date:   7/2/2024     Order Specific Question:   Specimen Source     Answer:   Urine    POCT Urine Drug Screen Presump     Interpretive Information:     Negative:  No drug detected at the cut off level.   Positive:  This result  represents presumptive positive for the   tested drug, other substances may yield a positive response other   than the analyte of interest. This result should be utilized for   diagnostic purpose only. Confirmation testing will be performed upon physician request only.              Requested Prescriptions     Signed Prescriptions Disp Refills    baclofen (LIORESAL) 10 MG tablet 90 tablet 0     Sig: Take 1 tablet (10 mg total) by mouth 3 (three) times daily.    tiZANidine 4 mg Cap 60 capsule 0     Sig: Take 1 capsule by mouth every 12 (twelve) hours.    HYDROcodone-acetaminophen (NORCO)  mg per tablet 90 tablet 0     Sig: Take 1 tablet by mouth every 8 (eight) hours.       Assessment:     1. Multiple sclerosis    2. Cervical radiculopathy    3. Lumbosacral spondylosis without myelopathy    4. Neck pain    5. Myalgia    6. Encounter for long-term (current) use of other medications         A's of Opioid Risk Assessment  Activity:Patient can perform ADL.   Analgesia:Patients pain is partially controlled by current medication. Patient has tried OTC medications such as Tylenol and Ibuprofen with out relief.   Adverse Effects: Patient denies constipation or sedation.  Aberrant Behavior:  reviewed with no aberrant drug seeking/taking behavior.  Overdose reversal drug naloxone discussed    Drug screen reviewed      X-ray lumbar spine [facility] May 2021 multiple level degenerative changes no fracture noted    MRI [facility] lumbar spine degenerative changes multiple levels will place report under media      Plan:    Narcan January 2023    Presumptive drug screen May 4, 2023, negative should have read opiates    Will order definitive drug screen for clarification     Last refill date April 11, 2023 Norco 10, 90 tablets        Follows Neurology multiple sclerosis [facility]     Chronic neck and joint pain she states her neck pain worse with flexion extension rotation cervical spine ongoing for more than 3 months    Considering  cervical spine trigger point injections     Has a long history neck and back pain muscle skeletal facet joint in nature  Pain is consistent with trigger-point pattern  Has pain with range of motion cervical spine  Because of pain which limits activity does have some deconditioning in this area due to pain  Focal tenderness paraspinous muscle in this area  Multiple multilevel band paraspinous muscle in this area  Taut response to snapping palpation in this area  Has tried over-the-counter anti-inflammatories tried home exercise program physical therapy none have alleviated the discomfort    Continue current medication    Request prior authorization cervical trigger-point injection    Follow-up 1 month     Dr. Gaston, January 2024    Bring original prescription medication bottles/container/box with labels to each visit

## 2023-05-11 ENCOUNTER — CLINICAL SUPPORT (OUTPATIENT)
Dept: OTOLARYNGOLOGY | Facility: CLINIC | Age: 43
End: 2023-05-11
Payer: COMMERCIAL

## 2023-05-11 DIAGNOSIS — J30.1 SEASONAL ALLERGIC RHINITIS DUE TO POLLEN: ICD-10-CM

## 2023-05-11 DIAGNOSIS — J30.1 ALLERGIC RHINITIS DUE TO GRASS POLLEN: ICD-10-CM

## 2023-05-11 DIAGNOSIS — J31.0 CHRONIC RHINITIS: Primary | ICD-10-CM

## 2023-05-11 DIAGNOSIS — J30.89 OTHER ALLERGIC RHINITIS: ICD-10-CM

## 2023-05-11 DIAGNOSIS — J30.1 ALLERGIC REACTION TO GRASS POLLEN: ICD-10-CM

## 2023-05-11 DIAGNOSIS — J30.89 ALLERGIC RHINITIS DUE TO INSECT: ICD-10-CM

## 2023-05-11 DIAGNOSIS — J30.5 ALLERGIC RHINITIS DUE TO FOOD: ICD-10-CM

## 2023-05-11 PROCEDURE — 95115 IMMUNOTHERAPY ONE INJECTION: CPT | Mod: PBBFAC | Performed by: OTOLARYNGOLOGY

## 2023-05-11 NOTE — PROGRESS NOTES
Established patient with Dr. Frnacis. See previous note for written order. Allergy injections per Dr. Francis.   20 minute mm reaction  3:07 PM   left arm; observation 7mm

## 2023-05-16 LAB
6-ACETYLMORPHINE, URINE (RUSH): NEGATIVE 10 NG/ML
7-AMINOCLONAZEPAM, URINE (RUSH): NEGATIVE 25 NG/ML
A-HYDROXYALPRAZOLAM, URINE (RUSH): NEGATIVE 25 NG/ML
ACETYL FENTANYL, URINE (RUSH): NEGATIVE 2.5 NG/ML
ACETYL NORFENTANYL OXALATE, URINE (RUSH): NEGATIVE 5 NG/ML
AMPHET UR QL SCN: NEGATIVE
BENZOYLECGONINE, URINE (RUSH): NEGATIVE 100 NG/ML
BUPRENORPHINE UR QL SCN: NEGATIVE 25 NG/ML
CODEINE, URINE (RUSH): NEGATIVE 25 NG/ML
CREAT UR-MCNC: 36 MG/DL (ref 28–219)
EDDP, URINE (RUSH): NEGATIVE 25 NG/ML
FENTANYL, URINE (RUSH): NEGATIVE 2.5 NG/ML
HYDROCODONE, URINE (RUSH): >250 25 NG/ML
HYDROMORPHONE, URINE (RUSH): 163.9 25 NG/ML
LORAZEPAM, URINE (RUSH): NEGATIVE 25 NG/ML
METHADONE UR QL SCN: NEGATIVE 25 NG/ML
METHAMPHET UR QL SCN: NEGATIVE
MORPHINE, URINE (RUSH): NEGATIVE 25 NG/ML
NORBUPRENORPHINE, URINE (RUSH): NEGATIVE 25 NG/ML
NORDIAZEPAM, URINE (RUSH): NEGATIVE 25 NG/ML
NORFENTANYL OXALATE, URINE (RUSH): NEGATIVE 5 NG/ML
NORHYDROCODONE, URINE (RUSH): >500 50 NG/ML
NOROXYCODONE HCL, URINE (RUSH): NEGATIVE 50 NG/ML
OXAZEPAM, URINE (RUSH): NEGATIVE 25 NG/ML
OXYCODONE UR QL SCN: NEGATIVE 25 NG/ML
OXYMORPHONE, URINE (RUSH): NEGATIVE 25 NG/ML
PH UR STRIP: 6 PH UNITS
SP GR UR STRIP: 1
TAPENTADOL, URINE (RUSH): NEGATIVE 25 NG/ML
TEMAZEPAM, URINE (RUSH): NEGATIVE 25 NG/ML
THC-COOH, URINE (RUSH): NEGATIVE 25 NG/ML
TRAMADOL, URINE (RUSH): NEGATIVE 100 NG/ML

## 2023-06-01 ENCOUNTER — CLINICAL SUPPORT (OUTPATIENT)
Dept: OTOLARYNGOLOGY | Facility: CLINIC | Age: 43
End: 2023-06-01
Payer: COMMERCIAL

## 2023-06-01 DIAGNOSIS — J30.5 ALLERGIC RHINITIS DUE TO FOOD: ICD-10-CM

## 2023-06-01 DIAGNOSIS — J30.89 OTHER ALLERGIC RHINITIS: ICD-10-CM

## 2023-06-01 DIAGNOSIS — J30.1 ALLERGIC REACTION TO GRASS POLLEN: ICD-10-CM

## 2023-06-01 DIAGNOSIS — J30.89 ALLERGIC RHINITIS DUE TO INSECT: ICD-10-CM

## 2023-06-01 DIAGNOSIS — J30.1 ALLERGIC RHINITIS DUE TO GRASS POLLEN: ICD-10-CM

## 2023-06-01 DIAGNOSIS — J31.0 CHRONIC RHINITIS: Primary | ICD-10-CM

## 2023-06-01 DIAGNOSIS — J30.1 SEASONAL ALLERGIC RHINITIS DUE TO POLLEN: ICD-10-CM

## 2023-06-01 PROCEDURE — 95115 IMMUNOTHERAPY ONE INJECTION: CPT | Mod: PBBFAC | Performed by: OTOLARYNGOLOGY

## 2023-06-01 NOTE — PROGRESS NOTES
Established patient with Dr. Francis. See previous note for written order. Allergy injections per Dr. Francis.   20 minute mm reaction  1:49 PM   right arm; observation 11mm

## 2023-06-06 ENCOUNTER — OFFICE VISIT (OUTPATIENT)
Dept: OBSTETRICS AND GYNECOLOGY | Facility: CLINIC | Age: 43
End: 2023-06-06
Payer: COMMERCIAL

## 2023-06-06 VITALS
SYSTOLIC BLOOD PRESSURE: 114 MMHG | BODY MASS INDEX: 23.14 KG/M2 | HEIGHT: 66 IN | OXYGEN SATURATION: 96 % | WEIGHT: 144 LBS | TEMPERATURE: 98 F | DIASTOLIC BLOOD PRESSURE: 80 MMHG | HEART RATE: 89 BPM

## 2023-06-06 DIAGNOSIS — Z01.411 ENCOUNTER FOR GYNECOLOGICAL EXAMINATION (GENERAL) (ROUTINE) WITH ABNORMAL FINDINGS: Primary | ICD-10-CM

## 2023-06-06 DIAGNOSIS — Z12.31 ENCOUNTER FOR SCREENING MAMMOGRAM FOR MALIGNANT NEOPLASM OF BREAST: ICD-10-CM

## 2023-06-06 DIAGNOSIS — Z12.72 SCREENING FOR VAGINAL CANCER: ICD-10-CM

## 2023-06-06 DIAGNOSIS — Z72.51 HIGH RISK HETEROSEXUAL BEHAVIOR: ICD-10-CM

## 2023-06-06 LAB
CANDIDA SPECIES: POSITIVE
GARDNERELLA: POSITIVE
TRICHOMONAS: NEGATIVE

## 2023-06-06 PROCEDURE — 99386 PR PREVENTIVE VISIT,NEW,40-64: ICD-10-PCS | Mod: ,,, | Performed by: ADVANCED PRACTICE MIDWIFE

## 2023-06-06 PROCEDURE — 1159F PR MEDICATION LIST DOCUMENTED IN MEDICAL RECORD: ICD-10-PCS | Mod: CPTII,,, | Performed by: ADVANCED PRACTICE MIDWIFE

## 2023-06-06 PROCEDURE — 3079F DIAST BP 80-89 MM HG: CPT | Mod: CPTII,,, | Performed by: ADVANCED PRACTICE MIDWIFE

## 2023-06-06 PROCEDURE — 87510 BACTERIAL VAGINOSIS: ICD-10-PCS | Mod: ,,, | Performed by: CLINICAL MEDICAL LABORATORY

## 2023-06-06 PROCEDURE — 87491 CHLMYD TRACH DNA AMP PROBE: CPT | Mod: ,,, | Performed by: CLINICAL MEDICAL LABORATORY

## 2023-06-06 PROCEDURE — 87591 CHLAMYDIA/GONORRHOEAE(GC), PCR: ICD-10-PCS | Mod: ,,, | Performed by: CLINICAL MEDICAL LABORATORY

## 2023-06-06 PROCEDURE — 1159F MED LIST DOCD IN RCRD: CPT | Mod: CPTII,,, | Performed by: ADVANCED PRACTICE MIDWIFE

## 2023-06-06 PROCEDURE — 87660 BACTERIAL VAGINOSIS: ICD-10-PCS | Mod: ,,, | Performed by: CLINICAL MEDICAL LABORATORY

## 2023-06-06 PROCEDURE — 88142 CYTOPATH C/V THIN LAYER: CPT | Mod: TC,GCY | Performed by: ADVANCED PRACTICE MIDWIFE

## 2023-06-06 PROCEDURE — 3074F SYST BP LT 130 MM HG: CPT | Mod: CPTII,,, | Performed by: ADVANCED PRACTICE MIDWIFE

## 2023-06-06 PROCEDURE — 87510 GARDNER VAG DNA DIR PROBE: CPT | Mod: ,,, | Performed by: CLINICAL MEDICAL LABORATORY

## 2023-06-06 PROCEDURE — 87480 BACTERIAL VAGINOSIS: ICD-10-PCS | Mod: ,,, | Performed by: CLINICAL MEDICAL LABORATORY

## 2023-06-06 PROCEDURE — 87591 N.GONORRHOEAE DNA AMP PROB: CPT | Mod: ,,, | Performed by: CLINICAL MEDICAL LABORATORY

## 2023-06-06 PROCEDURE — 87624 HUMAN PAPILLOMAVIRUS (HPV): ICD-10-PCS | Mod: ,,, | Performed by: CLINICAL MEDICAL LABORATORY

## 2023-06-06 PROCEDURE — 87660 TRICHOMONAS VAGIN DIR PROBE: CPT | Mod: ,,, | Performed by: CLINICAL MEDICAL LABORATORY

## 2023-06-06 PROCEDURE — 99386 PREV VISIT NEW AGE 40-64: CPT | Mod: ,,, | Performed by: ADVANCED PRACTICE MIDWIFE

## 2023-06-06 PROCEDURE — 87624 HPV HI-RISK TYP POOLED RSLT: CPT | Mod: ,,, | Performed by: CLINICAL MEDICAL LABORATORY

## 2023-06-06 PROCEDURE — 87480 CANDIDA DNA DIR PROBE: CPT | Mod: ,,, | Performed by: CLINICAL MEDICAL LABORATORY

## 2023-06-06 PROCEDURE — 3008F BODY MASS INDEX DOCD: CPT | Mod: CPTII,,, | Performed by: ADVANCED PRACTICE MIDWIFE

## 2023-06-06 PROCEDURE — 3074F PR MOST RECENT SYSTOLIC BLOOD PRESSURE < 130 MM HG: ICD-10-PCS | Mod: CPTII,,, | Performed by: ADVANCED PRACTICE MIDWIFE

## 2023-06-06 PROCEDURE — 3008F PR BODY MASS INDEX (BMI) DOCUMENTED: ICD-10-PCS | Mod: CPTII,,, | Performed by: ADVANCED PRACTICE MIDWIFE

## 2023-06-06 PROCEDURE — 87491 CHLAMYDIA/GONORRHOEAE(GC), PCR: ICD-10-PCS | Mod: ,,, | Performed by: CLINICAL MEDICAL LABORATORY

## 2023-06-06 PROCEDURE — 3079F PR MOST RECENT DIASTOLIC BLOOD PRESSURE 80-89 MM HG: ICD-10-PCS | Mod: CPTII,,, | Performed by: ADVANCED PRACTICE MIDWIFE

## 2023-06-07 ENCOUNTER — TELEPHONE (OUTPATIENT)
Dept: OBSTETRICS AND GYNECOLOGY | Facility: CLINIC | Age: 43
End: 2023-06-07
Payer: COMMERCIAL

## 2023-06-07 DIAGNOSIS — N76.0 BACTERIAL VAGINOSIS: Primary | ICD-10-CM

## 2023-06-07 DIAGNOSIS — B37.31 VAGINAL CANDIDIASIS: ICD-10-CM

## 2023-06-07 DIAGNOSIS — B96.89 BACTERIAL VAGINOSIS: Primary | ICD-10-CM

## 2023-06-07 LAB
CHLAMYDIA BY PCR: NEGATIVE
GH SERPL-MCNC: NORMAL NG/ML
INSULIN SERPL-ACNC: NORMAL U[IU]/ML
LAB AP CLINICAL INFORMATION: NORMAL
LAB AP GYN INTERPRETATION: NEGATIVE
LAB AP PAP DISCLAIMER COMMENTS: NORMAL
N. GONORRHOEAE (GC) BY PCR: NEGATIVE
RENIN PLAS-CCNC: NORMAL NG/ML/H

## 2023-06-07 RX ORDER — TERCONAZOLE 4 MG/G
1 CREAM VAGINAL NIGHTLY
Qty: 7 G | Refills: 0 | Status: SHIPPED | OUTPATIENT
Start: 2023-06-07

## 2023-06-07 RX ORDER — METRONIDAZOLE 500 MG/1
500 TABLET ORAL 2 TIMES DAILY
Qty: 14 TABLET | Refills: 0 | Status: SHIPPED | OUTPATIENT
Start: 2023-06-07 | End: 2023-06-14

## 2023-06-07 NOTE — TELEPHONE ENCOUNTER
----- Message from Yanet Wang CNM sent at 6/7/2023 11:22 AM CDT -----  Review with pt.as BV and yeast.  I sent Flagyl and Terazol cream

## 2023-06-08 ENCOUNTER — TELEPHONE (OUTPATIENT)
Dept: OBSTETRICS AND GYNECOLOGY | Facility: CLINIC | Age: 43
End: 2023-06-08
Payer: COMMERCIAL

## 2023-06-10 LAB
HPV 16: NEGATIVE
HPV 18: NEGATIVE
HPV OTHER: NEGATIVE

## 2023-06-10 NOTE — PROGRESS NOTES
Subjective:      Patient ID: Pili Love is a 42 y.o. female.    Chief Complaint:  Annual Exam (Last pap: mcke)      History of Present Illness  Pili s here for an annual exam.  She is sexually active and has had a hysterectomy 2 years ago for fibroids..  Her last pap was 10/8/99 with Dr. Vasquez. She reports hot flashes.  She has problems with chronic back and neck pain.  She also has MS.  Annual Exam-Postmenopausal  Patient presents for annual exam. The patient has complaints today. The patient is sexually active. GYN screening history: last pap: approximate date  and was normal. The patient is not taking hormone replacement therapy. Patient denies post-menopausal vaginal bleeding. She c/o hot flashes  The patient wears seatbelts: yes. The patient participates in regular exercise: no. Has the patient ever been transfused or tattooed?: not asked. The patient reports that there is not domestic violence in her life.    GYN & OB History  No LMP recorded. Patient has had a hysterectomy.   Date of Last Pap: No result [id number]    OB History    Para Term  AB Living   7 6     1 6   SAB IAB Ectopic Multiple Live Births           6      # Outcome Date GA Lbr All/2nd Weight Sex Delivery Anes PTL Lv   7 AB            6 Para            5 Para            4 Para            3 Para            2 Para            1 Para                Review of Systems  Review of Systems   Constitutional: Negative.    HENT:  Positive for rhinorrhea.    Respiratory: Negative.     Cardiovascular: Negative.    Gastrointestinal: Negative.    Endocrine: Positive for heat intolerance.   Musculoskeletal:  Positive for back pain and neck pain.   Skin: Negative.    Neurological: Negative.    Psychiatric/Behavioral: Negative.        Objective:    Physical Exam   Constitutional: She is oriented to person, place, and time. She appears well-developed.   HENT:   Head: Normocephalic.   Eyes: EOM are normal.   Neck: No thyromegaly  present.   Cardiovascular: Normal rate, regular rhythm and normal heart sounds.   Pulmonary/Chest: Effort normal and breath sounds normal. She exhibits no mass, no tenderness, no laceration, no deformity and no retraction. Right breast exhibits no inverted nipple, no mass, no nipple discharge, no skin change and no tenderness. Left breast exhibits no inverted nipple, no mass, no nipple discharge, no skin change and no tenderness.   Abdominal: Soft. She exhibits mass. There is no abdominal tenderness.   Genitourinary: Pelvic exam was performed with patient supine. Skenes normal and bartholins normal. Right labia normal and left labia normal. Right adnexum displays no mass and no tenderness. Left adnexum displays no mass and no tenderness. Also,  pap smear completed  Uterus is absent.   Musculoskeletal:      Cervical back: Neck supple.   Neurological: She is alert and oriented to person, place, and time.   Skin: Skin is warm and dry.   Psychiatric: She has a normal mood and affect. Her behavior is normal.   Nursing note and vitals reviewed.     Assessment:     1. Encounter for gynecological examination (general) (routine) with abnormal findings    2. Screening for vaginal cancer    3. Encounter for screening mammogram for malignant neoplasm of breast    4. High risk heterosexual behavior          Plan:   Call result of testing  Condom use encouraged  Appointment for mammogram  F/u prn and one year

## 2023-06-12 ENCOUNTER — TELEPHONE (OUTPATIENT)
Dept: OBSTETRICS AND GYNECOLOGY | Facility: CLINIC | Age: 43
End: 2023-06-12
Payer: COMMERCIAL

## 2023-06-12 NOTE — TELEPHONE ENCOUNTER
----- Message from Yanet Wang CNM sent at 6/12/2023  8:28 AM CDT -----  Review with pt.as normal pap and negative HPV.  Repeat pap in 5 years, annual gyn exam.

## 2023-06-15 ENCOUNTER — CLINICAL SUPPORT (OUTPATIENT)
Dept: OTOLARYNGOLOGY | Facility: CLINIC | Age: 43
End: 2023-06-15
Payer: COMMERCIAL

## 2023-06-15 DIAGNOSIS — J30.5 ALLERGIC RHINITIS DUE TO FOOD: ICD-10-CM

## 2023-06-15 DIAGNOSIS — J30.89 ALLERGIC RHINITIS DUE TO INSECT: ICD-10-CM

## 2023-06-15 DIAGNOSIS — J30.1 SEASONAL ALLERGIC RHINITIS DUE TO POLLEN: ICD-10-CM

## 2023-06-15 DIAGNOSIS — J30.89 OTHER ALLERGIC RHINITIS: ICD-10-CM

## 2023-06-15 DIAGNOSIS — J30.1 ALLERGIC REACTION TO GRASS POLLEN: ICD-10-CM

## 2023-06-15 DIAGNOSIS — J31.0 CHRONIC RHINITIS: Primary | ICD-10-CM

## 2023-06-15 DIAGNOSIS — J30.1 ALLERGIC RHINITIS DUE TO GRASS POLLEN: ICD-10-CM

## 2023-06-15 PROCEDURE — 95115 IMMUNOTHERAPY ONE INJECTION: CPT | Mod: PBBFAC | Performed by: OTOLARYNGOLOGY

## 2023-06-15 NOTE — PROGRESS NOTES
Subjective:         Patient ID: Pili Love is a 42 y.o. female.    Chief Complaint: Low-back Pain, Neck Pain, and Hip Pain        Pain  This is a chronic problem. The current episode started more than 1 year ago. The problem occurs daily. The problem has been waxing and waning. Associated symptoms include arthralgias, myalgias and neck pain. Pertinent negatives include no anorexia, chest pain, chills, coughing, diaphoresis, fever, sore throat, vertigo or vomiting.   Review of Systems   Constitutional:  Negative for activity change, appetite change, chills, diaphoresis and fever.   HENT:  Negative for drooling, ear discharge, ear pain, facial swelling, nosebleeds, sore throat, trouble swallowing, voice change and goiter.    Eyes:  Negative for photophobia, pain, discharge, redness and visual disturbance.   Respiratory:  Negative for apnea, cough, choking, chest tightness, shortness of breath, wheezing and stridor.    Cardiovascular:  Negative for chest pain, palpitations and leg swelling.   Gastrointestinal:  Negative for abdominal distention, anorexia, diarrhea, rectal pain, vomiting and fecal incontinence.   Endocrine: Negative for cold intolerance, heat intolerance, polydipsia, polyphagia and polyuria.   Genitourinary:  Negative for flank pain, frequency and hot flashes.   Musculoskeletal:  Positive for arthralgias, back pain, myalgias and neck pain.   Integumentary:  Negative for color change and pallor.   Allergic/Immunologic: Negative for immunocompromised state.   Neurological:  Negative for dizziness, vertigo, seizures, syncope, facial asymmetry, speech difficulty, light-headedness, coordination difficulties, memory loss and coordination difficulties.   Hematological:  Negative for adenopathy. Does not bruise/bleed easily.   Psychiatric/Behavioral:  Negative for agitation, behavioral problems, confusion, decreased concentration, dysphoric mood, hallucinations, self-injury and suicidal ideas. The  patient is not nervous/anxious and is not hyperactive.          Past Medical History:   Diagnosis Date    Anxiety     Asthma     Depression     Endometriosis, unspecified     Migraine     Multiple sclerosis     Dr Antonio     Past Surgical History:   Procedure Laterality Date     bilateral cervical paraspinous muscle trigger point injection Bilateral [id number]    D Shows  x 4     SECTION      FOOT SURGERY Left     left toe fusion    HYSTERECTOMY      INJECTION OF ANESTHETIC AGENT AROUND MEDIAL BRANCH NERVES INNERVATING LUMBAR FACET JOINT Bilateral 2022    Procedure: BLOCK, NERVE, FACET JOINT, LUMBAR, MEDIAL BRANCH;  Surgeon: Twyla Gaston MD;  Location: [facility];  Service: Pain Management;  Laterality: Bilateral;    INJECTION OF ANESTHETIC AGENT AROUND MEDIAL BRANCH NERVES INNERVATING LUMBAR FACET JOINT Bilateral 11/10/2022    Procedure: Block-nerve-medial branch-lumbar, bilateral L4 through S1;  Surgeon: Twyla Gaston MD;  Location: [facility];  Service: Pain Management;  Laterality: Bilateral;  patient will have to be tested    KNEE CARTILAGE SURGERY Right     LUMBAR PUNCTURE      Dr Trejo    MYRINGOTOMY WITH INSERTION OF VENTILATION TUBE Bilateral 2023    Procedure: MYRINGOTOMY, WITH TYMPANOSTOMY TUBE INSERTION;  Surgeon: Gagan Francis MD;  Location: [facility];  Service: ENT;  Laterality: Bilateral;    RADIOFREQUENCY ABLATION OF LUMBAR MEDIAL BRANCH NERVE AT SINGLE LEVEL Right 12/15/2022    Procedure: Radiofrequency Ablation, Nerve, Spinal, Lumbar, Medial Branch, Level L4-S1;  Surgeon: Twyla Gaston MD;  Location: [facility];  Service: Pain Management;  Laterality: Right;  cong left after right side is done    RADIOFREQUENCY ABLATION OF LUMBAR MEDIAL BRANCH NERVE AT SINGLE LEVEL Left 2023    Procedure: RADIOFREQUENCY ABLATION, NERVE, SPINAL, LUMBAR, MEDIAL BRANCH, 1 LEVEL;  Surgeon: Twyla Gasotn MD;  Location: [facility];  Service:  "Pain Management;  Laterality: Left;  Left L4-S1 RFTC. Pt had right on 12/15    Right C3-C7 FI Right 04/17/2018    Dr Trejo    TUBAL LIGATION      VAGINAL DELIVERY       Social History     Socioeconomic History    Marital status: Single   Tobacco Use    Smoking status: Never    Smokeless tobacco: Never   Substance and Sexual Activity    Alcohol use: Never    Drug use: Never    Sexual activity: Yes     Birth control/protection: See Surgical Hx     Family History   Problem Relation Age of Onset    Hypertension Paternal Grandfather     Heart attack Paternal Grandfather     Cancer Maternal Grandfather     Hypertension Father     Asthma Father      Review of patient's allergies indicates:   Allergen Reactions    Cockroach     Grass pollen-bermuda, standard     Grass pollen-veronica, standard     Latex, natural rubber     Peanuts [peanut (legumes)] Other (See Comments)     Allergy testing    Wheat containing prod Other (See Comments)     Allergy testing        Objective:  Vitals:    06/19/23 1357   BP: 97/70   Pulse: 106   Resp: 18   Weight: 70.3 kg (155 lb)   Height: 5' 3" (1.6 m)   PainSc:   6         Physical Exam  Vitals and nursing note reviewed. Exam conducted with a chaperone present.   Constitutional:       General: She is awake. She is not in acute distress.     Appearance: Normal appearance. She is not ill-appearing or diaphoretic.   HENT:      Head: Normocephalic and atraumatic.      Nose: Nose normal.      Mouth/Throat:      Mouth: Mucous membranes are moist.      Pharynx: Oropharynx is clear.   Eyes:      Conjunctiva/sclera: Conjunctivae normal.      Pupils: Pupils are equal, round, and reactive to light.   Cardiovascular:      Rate and Rhythm: Normal rate.   Pulmonary:      Effort: Pulmonary effort is normal. No respiratory distress.   Abdominal:      Palpations: Abdomen is soft.      Tenderness: There is no guarding.   Musculoskeletal:         General: Normal range of motion.      Cervical back: Normal range of " motion and neck supple. Tenderness present. No rigidity.      Thoracic back: Tenderness present.      Lumbar back: Tenderness present.   Skin:     General: Skin is warm and dry.      Coloration: Skin is not jaundiced or pale.   Neurological:      General: No focal deficit present.      Mental Status: She is alert and oriented to person, place, and time. Mental status is at baseline.      Cranial Nerves: No cranial nerve deficit (II-XII).   Psychiatric:         Mood and Affect: Mood normal.         Behavior: Behavior normal. Behavior is cooperative.         Thought Content: Thought content normal.         FL Fluoro for Pain Management  See OP Notes for results.     IMPRESSION: See OP Notes for results.     This procedure was auto-finalized by: Virtual Radiologist         Office Visit on 06/06/2023   Component Date Value Ref Range Status    Case Report 06/06/2023    Final                    Value:Pap Cytology                                      Case: [id number]                                   Authorizing Provider:  Yanet Wang CNM     Collected:           06/06/2023 03:56 PM          Ordering Location:     [facility]       Received:            06/06/2023 03:56 PM                                 Clinic - Obstetrics and                                                                             Gynecology                                                                   First Screen:          FAIZA Siddiqui(ASCP)                                                    Specimen:    Liquid-Based Pap Test, Screening, Vagina                                                   Interpretation 06/06/2023 Negative              Final    General Categorization 06/06/2023 Negative for intraepithelial lesion or malignancy   Final    Specimen Adequacy 06/06/2023 Satisfactory for evaluation  Scant cellularity   Final    Clinical Information 06/06/2023    Final                    Value:This result contains rich text  formatting which cannot be displayed here.    Disclaimer 06/06/2023    Final                    Value:This result contains rich text formatting which cannot be displayed here.    Candida Species 06/06/2023 Positive (A)  Negative, Invalid Final    Gardnerella 06/06/2023 Positive (A)  Negative, Invalid Final    Trichomonas 06/06/2023 Negative  Negative, Invalid Final    Chlamydia by PCR 06/06/2023 Negative  Negative, Invalid Final    N. gonorrhoeae (GC) by PCR 06/06/2023 Negative  Negative, Invalid Final    HPV 16 06/06/2023 Negative  Negative, Invalid Final    HPV 18 06/06/2023 Negative  Negative, Invalid Final    HPV Other 06/06/2023 Negative  Negative, Invalid Final   Office Visit on 05/04/2023   Component Date Value Ref Range Status    POC Amphetamines 05/04/2023 Negative  Negative, Inconclusive Final    POC Barbiturates 05/04/2023 Negative  Negative, Inconclusive Final    POC Benzodiazepines 05/04/2023 Negative  Negative, Inconclusive Final    POC Cocaine 05/04/2023 Negative  Negative, Inconclusive Final    POC THC 05/04/2023 Negative  Negative, Inconclusive Final    POC Methadone 05/04/2023 Negative  Negative, Inconclusive Final    POC Methamphetamine 05/04/2023 Negative  Negative, Inconclusive Final    POC Opiates 05/04/2023 Negative  Negative, Inconclusive Final    POC Oxycodone 05/04/2023 Negative  Negative, Inconclusive Final    POC Phencyclidine 05/04/2023 Negative  Negative, Inconclusive Final    POC Methylenedioxymethamphetamine * 05/04/2023 Negative  Negative, Inconclusive Final    POC Tricyclic Antidepressants 05/04/2023 Negative  Negative, Inconclusive Final    POC Buprenorphine 05/04/2023 Negative   Final     Acceptable 05/04/2023 Yes   Final    POC Temperature (Urine) 05/04/2023 90   Final    pH, UA 05/04/2023 6.0  5.0 to 8.0 pH Units Final    Creatinine, Urine 05/04/2023 36  28 - 219 mg/dL Final    6-Acetylmorphine 05/04/2023 Negative  10 ng/mL Final    7-Aminoclonazepam 05/04/2023  Negative  Negative 25 ng/mL Final    a-Hydroxyalprazolam 05/04/2023 Negative  Negative 25 ng/mL Final    Acetyl Fentanyl 05/04/2023 Negative  2.5 ng/mL Final    Acetyl Norfentanyl Oxalate 05/04/2023 Negative  5 ng/mL Final    Benzoylecgonine 05/04/2023 Negative  100 ng/mL Final    Buprenorphine 05/04/2023 Negative  25 ng/mL Final    Codeine 05/04/2023 Negative  25 ng/mL Final    EDDP 05/04/2023 Negative  25 ng/mL Final    Fentanyl 05/04/2023 Negative  2.5 ng/mL Final    Hydrocodone 05/04/2023 >250.0 (H)  <25.0 25 ng/mL Final    Hydromorphone 05/04/2023 163.9 (H)  <25.0 25 ng/mL Final    Lorazepam 05/04/2023 Negative  25 ng/mL Final    Morphine 05/04/2023 Negative  25 ng/mL Final    Norbuprenorphine 05/04/2023 Negative  25 ng/mL Final    Nordiazepam 05/04/2023 Negative  25 ng/mL Final    Norfentanyl Oxalate 05/04/2023 Negative  5 ng/mL Final    Norhydrocodone 05/04/2023 >500.0 (H)  <50.0 50 ng/mL Final    Noroxycodone HCL 05/04/2023 Negative  50 ng/mL Final    Oxazepam 05/04/2023 Negative  25 ng/mL Final    Oxymorphone 05/04/2023 Negative  25 ng/mL Final    Tapentadol 05/04/2023 Negative  25 ng/mL Final    Temazepam 05/04/2023 Negative  25 ng/mL Final    THC-COOH 05/04/2023 Negative  25 ng/mL Final    Tramadol 05/04/2023 Negative  100 ng/mL Final    Amphetamine, Urine 05/04/2023 Negative  Negative Final    Methamphetamines, Urine 05/04/2023 Negative  Negative Final    Methadone, Urine 05/04/2023 Negative  Negative 25 ng/mL Final    Oxycodone, Urine 05/04/2023 Negative  Negative 25 ng/mL Final    Specific Gravity, UA 05/04/2023 1.005  <=1.030 Final   Abstract on 02/06/2023   Component Date Value Ref Range Status    House Dust Mites/D.P., IgE 02/06/2023 <0.35  kU/L Final    House Dust Mites/D.F., IgE 02/06/2023 <0.35  kU/L Final    Cat Epithelium, IgE 02/06/2023 <0.35  kU/L Final    Dog Dander, IgE 02/06/2023 <0.35  kU/L Final    [place] Grass, IgE 02/06/2023 3.90  kU/L Final    Billy Grass, IgE 02/06/2023 15.2  kU/L  Final    Cockroach, IgE 02/06/2023 1.22  kU/L Final    Penicillium, IgE 02/06/2023 <0.35  kU/L Final    Cladosporium, IgE 02/06/2023 <0.35  kU/L Final    Aspergillus Fumigatus, IgE 02/06/2023 <0.35  kU/L Final    Alternaria Tenuis, IgE 02/06/2023 <0.35  kU/L Final    Box [place]/Maple, IgE 02/06/2023 <0.35  kU/L Final    Silver Birch, IgE 02/06/2023 <0.35  kU/L Final    [place] Cavalier, IgE 02/06/2023 <0.35  kU/L Final    Oak, IgE 02/06/2023 <0.35  kU/L Final    Elm, IgE 02/06/2023 <0.35  kU/L Final    [place] Tree, IgE 02/06/2023 <0.35  kU/L Final    Pecan [place], IgE 02/06/2023 <0.35  kU/L Final    [place], IgE 02/06/2023 <0.35  kU/L Final    Short Ragweed, IgE 02/06/2023 <0.35  kU/L Final    Rough Pigweed, IgE 02/06/2023 <0.35  kU/L Final    Rough Romero Elder, IgE 02/06/2023 <0.35  kU/L Final    Immunoglobulin E (IgE) 02/06/2023 61.4  <=214 kU/L Final    [place]enrod, IgE 02/06/2023 <0.35  kU/L Final    [place] Generic Orderable 02/06/2023 SEE COMMENTS   Final    [place] Generic Orderable 02/06/2023 SEE COMMENTS   Final    [place] Generic Orderable 02/06/2023 SEE COMMENTS   Final    [place] Generic Orderable 02/06/2023 SEE COMMENTS   Final    [place] Generic Orderable 02/06/2023 SEE COMMENTS   Final    [place] Generic Orderable 02/06/2023 SEE COMMENTS   Final   Office Visit on 01/30/2023   Component Date Value Ref Range Status    POC Amphetamines 01/30/2023 Negative  Negative, Inconclusive Final    POC Barbiturates 01/30/2023 Negative  Negative, Inconclusive Final    POC Benzodiazepines 01/30/2023 Negative  Negative, Inconclusive Final    POC Cocaine 01/30/2023 Negative  Negative, Inconclusive Final    POC THC 01/30/2023 Negative  Negative, Inconclusive Final    POC Methadone 01/30/2023 Negative  Negative, Inconclusive Final    POC Methamphetamine 01/30/2023 Negative  Negative, Inconclusive Final    POC Opiates 01/30/2023 Presumptive Positive (A)  Negative, Inconclusive Final    POC Oxycodone 01/30/2023 Negative  Negative, Inconclusive  Final    POC Phencyclidine 01/30/2023 Negative  Negative, Inconclusive Final    POC Methylenedioxymethamphetamine * 01/30/2023 Negative  Negative, Inconclusive Final    POC Tricyclic Antidepressants 01/30/2023 Negative  Negative, Inconclusive Final    POC Buprenorphine 01/30/2023 Negative   Final     Acceptable 01/30/2023 Yes   Final    POC Temperature (Urine) 01/30/2023 92   Final         No orders of the defined types were placed in this encounter.          Requested Prescriptions     Signed Prescriptions Disp Refills    tiZANidine 4 mg Cap 60 capsule 1     Sig: Take 1 capsule by mouth every 12 (twelve) hours.    baclofen (LIORESAL) 10 MG tablet 90 tablet 1     Sig: Take 1 tablet (10 mg total) by mouth 3 (three) times daily.    HYDROcodone-acetaminophen (NORCO)  mg per tablet 90 tablet 0     Sig: Take 1 tablet by mouth every 8 (eight) hours.    HYDROcodone-acetaminophen (NORCO)  mg per tablet 90 tablet 0     Sig: Take 1 tablet by mouth every 8 (eight) hours.       Assessment:     1. Trochanteric bursitis of right hip    2. Cervical radiculopathy    3. Multiple sclerosis    4. Lumbosacral spondylosis without myelopathy         A's of Opioid Risk Assessment  Activity:Patient can perform ADL.   Analgesia:Patients pain is partially controlled by current medication. Patient has tried OTC medications such as Tylenol and Ibuprofen with out relief.   Adverse Effects: Patient denies constipation or sedation.  Aberrant Behavior:  reviewed with no aberrant drug seeking/taking behavior.  Overdose reversal drug naloxone discussed    Drug screen reviewed      X-ray lumbar spine [facility] May 2021 multiple level degenerative changes no fracture noted    MRI [facility] lumbar spine degenerative changes multiple levels will place report under media      Plan:    Narcan January 2023    Definitive drug screen May 4, 2023 consistent hydrocodone and metabolite    Follows Neurology multiple sclerosis [facility]      Complaining right lateral thigh pain pointing to her right trochanteric area worse with sitting or lying on her right side    Very tender right trochanteric area right trochanteric bursitis    After discussing options will proceed with     Toradol 60 mg IM, tolerated well    Continue current medication    Continue home exercise program as directed    Follow-up 2 months     Dr. Gaston, January 2024    Bring original prescription medication bottles/container/box with labels to each visit

## 2023-06-15 NOTE — PROGRESS NOTES
Established patient with Dr. Francis. See previous note for written order. Allergy injections per Dr. Francis.   20 minute mm reaction  4:01 PM   left arm; observation 3mm

## 2023-06-19 ENCOUNTER — OFFICE VISIT (OUTPATIENT)
Dept: PAIN MEDICINE | Facility: CLINIC | Age: 43
End: 2023-06-19
Payer: COMMERCIAL

## 2023-06-19 VITALS
HEIGHT: 63 IN | HEART RATE: 106 BPM | DIASTOLIC BLOOD PRESSURE: 70 MMHG | WEIGHT: 155 LBS | RESPIRATION RATE: 18 BRPM | SYSTOLIC BLOOD PRESSURE: 97 MMHG | BODY MASS INDEX: 27.46 KG/M2

## 2023-06-19 DIAGNOSIS — M47.817 LUMBOSACRAL SPONDYLOSIS WITHOUT MYELOPATHY: Chronic | ICD-10-CM

## 2023-06-19 DIAGNOSIS — M70.61 TROCHANTERIC BURSITIS OF RIGHT HIP: Primary | Chronic | ICD-10-CM

## 2023-06-19 DIAGNOSIS — M54.12 CERVICAL RADICULOPATHY: Chronic | ICD-10-CM

## 2023-06-19 DIAGNOSIS — G35 MULTIPLE SCLEROSIS: Chronic | ICD-10-CM

## 2023-06-19 PROCEDURE — 1159F MED LIST DOCD IN RCRD: CPT | Mod: CPTII,,, | Performed by: PHYSICIAN ASSISTANT

## 2023-06-19 PROCEDURE — 3008F BODY MASS INDEX DOCD: CPT | Mod: CPTII,,, | Performed by: PHYSICIAN ASSISTANT

## 2023-06-19 PROCEDURE — 3078F PR MOST RECENT DIASTOLIC BLOOD PRESSURE < 80 MM HG: ICD-10-PCS | Mod: CPTII,,, | Performed by: PHYSICIAN ASSISTANT

## 2023-06-19 PROCEDURE — 99214 OFFICE O/P EST MOD 30 MIN: CPT | Mod: S$PBB,25,, | Performed by: PHYSICIAN ASSISTANT

## 2023-06-19 PROCEDURE — 3008F PR BODY MASS INDEX (BMI) DOCUMENTED: ICD-10-PCS | Mod: CPTII,,, | Performed by: PHYSICIAN ASSISTANT

## 2023-06-19 PROCEDURE — 99214 PR OFFICE/OUTPT VISIT, EST, LEVL IV, 30-39 MIN: ICD-10-PCS | Mod: S$PBB,25,, | Performed by: PHYSICIAN ASSISTANT

## 2023-06-19 PROCEDURE — 1159F PR MEDICATION LIST DOCUMENTED IN MEDICAL RECORD: ICD-10-PCS | Mod: CPTII,,, | Performed by: PHYSICIAN ASSISTANT

## 2023-06-19 PROCEDURE — 99214 OFFICE O/P EST MOD 30 MIN: CPT | Mod: PBBFAC | Performed by: PHYSICIAN ASSISTANT

## 2023-06-19 PROCEDURE — 3074F PR MOST RECENT SYSTOLIC BLOOD PRESSURE < 130 MM HG: ICD-10-PCS | Mod: CPTII,,, | Performed by: PHYSICIAN ASSISTANT

## 2023-06-19 PROCEDURE — 96372 THER/PROPH/DIAG INJ SC/IM: CPT | Mod: PBBFAC | Performed by: PHYSICIAN ASSISTANT

## 2023-06-19 PROCEDURE — 3074F SYST BP LT 130 MM HG: CPT | Mod: CPTII,,, | Performed by: PHYSICIAN ASSISTANT

## 2023-06-19 PROCEDURE — 3078F DIAST BP <80 MM HG: CPT | Mod: CPTII,,, | Performed by: PHYSICIAN ASSISTANT

## 2023-06-19 RX ORDER — TIZANIDINE HYDROCHLORIDE 4 MG/1
1 CAPSULE, GELATIN COATED ORAL EVERY 12 HOURS
Qty: 60 CAPSULE | Refills: 1 | Status: SHIPPED | OUTPATIENT
Start: 2023-06-19 | End: 2023-08-16 | Stop reason: SDUPTHER

## 2023-06-19 RX ORDER — HYDROCODONE BITARTRATE AND ACETAMINOPHEN 10; 325 MG/1; MG/1
1 TABLET ORAL EVERY 8 HOURS
Qty: 90 TABLET | Refills: 0 | Status: SHIPPED | OUTPATIENT
Start: 2023-07-19 | End: 2023-08-16 | Stop reason: SDUPTHER

## 2023-06-19 RX ORDER — KETOROLAC TROMETHAMINE 30 MG/ML
60 INJECTION, SOLUTION INTRAMUSCULAR; INTRAVENOUS
Status: COMPLETED | OUTPATIENT
Start: 2023-06-19 | End: 2023-06-19

## 2023-06-19 RX ORDER — BACLOFEN 10 MG/1
10 TABLET ORAL 3 TIMES DAILY
Qty: 90 TABLET | Refills: 1 | Status: SHIPPED | OUTPATIENT
Start: 2023-06-19 | End: 2023-08-16 | Stop reason: SDUPTHER

## 2023-06-19 RX ORDER — HYDROCODONE BITARTRATE AND ACETAMINOPHEN 10; 325 MG/1; MG/1
1 TABLET ORAL EVERY 8 HOURS
Qty: 90 TABLET | Refills: 0 | Status: SHIPPED | OUTPATIENT
Start: 2023-06-19 | End: 2023-08-16 | Stop reason: SDUPTHER

## 2023-06-19 RX ADMIN — KETOROLAC TROMETHAMINE 60 MG: 60 INJECTION, SOLUTION INTRAMUSCULAR at 02:06

## 2023-06-27 ENCOUNTER — OFFICE VISIT (OUTPATIENT)
Dept: OTOLARYNGOLOGY | Facility: CLINIC | Age: 43
End: 2023-06-27
Payer: COMMERCIAL

## 2023-06-27 VITALS — HEIGHT: 63 IN | BODY MASS INDEX: 27.46 KG/M2 | WEIGHT: 155 LBS

## 2023-06-27 DIAGNOSIS — J30.1 SEASONAL ALLERGIC RHINITIS DUE TO POLLEN: ICD-10-CM

## 2023-06-27 DIAGNOSIS — H65.23 CHRONIC SEROUS OTITIS MEDIA OF BOTH EARS: Primary | ICD-10-CM

## 2023-06-27 PROCEDURE — 1160F PR REVIEW ALL MEDS BY PRESCRIBER/CLIN PHARMACIST DOCUMENTED: ICD-10-PCS | Mod: CPTII,,, | Performed by: OTOLARYNGOLOGY

## 2023-06-27 PROCEDURE — 1159F MED LIST DOCD IN RCRD: CPT | Mod: CPTII,,, | Performed by: OTOLARYNGOLOGY

## 2023-06-27 PROCEDURE — 99213 OFFICE O/P EST LOW 20 MIN: CPT | Mod: S$PBB,,, | Performed by: OTOLARYNGOLOGY

## 2023-06-27 PROCEDURE — 1160F RVW MEDS BY RX/DR IN RCRD: CPT | Mod: CPTII,,, | Performed by: OTOLARYNGOLOGY

## 2023-06-27 PROCEDURE — 99213 OFFICE O/P EST LOW 20 MIN: CPT | Mod: PBBFAC | Performed by: OTOLARYNGOLOGY

## 2023-06-27 PROCEDURE — 99213 PR OFFICE/OUTPT VISIT, EST, LEVL III, 20-29 MIN: ICD-10-PCS | Mod: S$PBB,,, | Performed by: OTOLARYNGOLOGY

## 2023-06-27 PROCEDURE — 3008F PR BODY MASS INDEX (BMI) DOCUMENTED: ICD-10-PCS | Mod: CPTII,,, | Performed by: OTOLARYNGOLOGY

## 2023-06-27 PROCEDURE — 1159F PR MEDICATION LIST DOCUMENTED IN MEDICAL RECORD: ICD-10-PCS | Mod: CPTII,,, | Performed by: OTOLARYNGOLOGY

## 2023-06-27 PROCEDURE — 3008F BODY MASS INDEX DOCD: CPT | Mod: CPTII,,, | Performed by: OTOLARYNGOLOGY

## 2023-06-27 NOTE — PROGRESS NOTES
Subjective:       Patient ID: Pili Love is a 42 y.o. female.    Chief Complaint: Otitis Media (3 month follow-up on bilateral PE tubes. Pt denies any pain or drainage from ears. )    Otitis Media: No ear pain.  Review of Systems   HENT:  Negative for ear pain.    All other systems reviewed and are negative.    Objective:      Physical Exam  General: NAD  Head: Normocephalic, atraumatic, no facial asymmetry/normal strength,  Ears: Both auricules normal in appearance, w/o deformities tympanic membranes tubes open in place external auditory canals normal  Nose: External nose w/o deformities normal turbinates no drainage or inflammation  Oral Cavity: Lips, gums, floor of mouth, tongue hard palate, and buccal mucosa without mass/lesion  Oropharynx: Mucosa pink and moist, soft palate, posterior pharynx and oropharyngeal wall without mass/lesion  Neck: Supple, symmetric, trachea midline, no palpable mass/lesion, no palpable cervical lymphadenopathy  Skin: Warm and dry, no concerning lesions  Respiratory: Respirations even, unlabored   Assessment:       1. Chronic serous otitis media of both ears    2. Seasonal allergic rhinitis due to pollen        Plan:       F/u 3 months

## 2023-08-16 NOTE — PROGRESS NOTES
Subjective:         Patient ID: Pili Love is a 42 y.o. female.    Chief Complaint: Back Pain (Pain in neck ,Lower back and right hip)        Pain  This is a chronic problem. The current episode started more than 1 year ago. The problem occurs daily. The problem has been unchanged. Associated symptoms include arthralgias, myalgias and neck pain. Pertinent negatives include no anorexia, chest pain, chills, coughing, diaphoresis, fever, sore throat, vertigo or vomiting.     Review of Systems   Constitutional:  Negative for activity change, appetite change, chills, diaphoresis and fever.   HENT:  Negative for drooling, ear discharge, ear pain, facial swelling, nosebleeds, sore throat, trouble swallowing, voice change and goiter.    Eyes:  Negative for photophobia, pain, discharge, redness and visual disturbance.   Respiratory:  Negative for apnea, cough, choking, chest tightness, shortness of breath, wheezing and stridor.    Cardiovascular:  Negative for chest pain, palpitations and leg swelling.   Gastrointestinal:  Negative for abdominal distention, anorexia, diarrhea, rectal pain, vomiting and fecal incontinence.   Endocrine: Negative for cold intolerance, heat intolerance, polydipsia, polyphagia and polyuria.   Genitourinary:  Negative for flank pain, frequency and hot flashes.   Musculoskeletal:  Positive for arthralgias, back pain, myalgias and neck pain.   Integumentary:  Negative for color change and pallor.   Allergic/Immunologic: Negative for immunocompromised state.   Neurological:  Negative for dizziness, vertigo, seizures, syncope, facial asymmetry, speech difficulty, light-headedness, coordination difficulties, memory loss and coordination difficulties.   Hematological:  Negative for adenopathy. Does not bruise/bleed easily.   Psychiatric/Behavioral:  Negative for agitation, behavioral problems, confusion, decreased concentration, dysphoric mood, hallucinations, self-injury and suicidal ideas. The  patient is not nervous/anxious and is not hyperactive.            Past Medical History:   Diagnosis Date    Anxiety     Asthma     Depression     Endometriosis, unspecified     Migraine     Multiple sclerosis     Dr Antonio     Past Surgical History:   Procedure Laterality Date     bilateral cervical paraspinous muscle trigger point injection Bilateral [id number]    D Shows  x 4     SECTION      FOOT SURGERY Left     left toe fusion    HYSTERECTOMY      INJECTION OF ANESTHETIC AGENT AROUND MEDIAL BRANCH NERVES INNERVATING LUMBAR FACET JOINT Bilateral 2022    Procedure: BLOCK, NERVE, FACET JOINT, LUMBAR, MEDIAL BRANCH;  Surgeon: Twyla Gaston MD;  Location: [facility];  Service: Pain Management;  Laterality: Bilateral;    INJECTION OF ANESTHETIC AGENT AROUND MEDIAL BRANCH NERVES INNERVATING LUMBAR FACET JOINT Bilateral 11/10/2022    Procedure: Block-nerve-medial branch-lumbar, bilateral L4 through S1;  Surgeon: Twyla Gaston MD;  Location: [facility];  Service: Pain Management;  Laterality: Bilateral;  patient will have to be tested    KNEE CARTILAGE SURGERY Right     LUMBAR PUNCTURE      Dr Trejo    MYRINGOTOMY WITH INSERTION OF VENTILATION TUBE Bilateral 2023    Procedure: MYRINGOTOMY, WITH TYMPANOSTOMY TUBE INSERTION;  Surgeon: Gagan Francis MD;  Location: [facility];  Service: ENT;  Laterality: Bilateral;    RADIOFREQUENCY ABLATION OF LUMBAR MEDIAL BRANCH NERVE AT SINGLE LEVEL Right 12/15/2022    Procedure: Radiofrequency Ablation, Nerve, Spinal, Lumbar, Medial Branch, Level L4-S1;  Surgeon: Twyla Gaston MD;  Location: [facility];  Service: Pain Management;  Laterality: Right;  cong left after right side is done    RADIOFREQUENCY ABLATION OF LUMBAR MEDIAL BRANCH NERVE AT SINGLE LEVEL Left 2023    Procedure: RADIOFREQUENCY ABLATION, NERVE, SPINAL, LUMBAR, MEDIAL BRANCH, 1 LEVEL;  Surgeon: Twyla Gaston MD;  Location: [facility];  Service:  "Pain Management;  Laterality: Left;  Left L4-S1 RFTC. Pt had right on 12/15    Right C3-C7 FI Right 04/17/2018    Dr Trejo    TUBAL LIGATION      VAGINAL DELIVERY       Social History     Socioeconomic History    Marital status: Single   Tobacco Use    Smoking status: Never    Smokeless tobacco: Never   Substance and Sexual Activity    Alcohol use: Never    Drug use: Never    Sexual activity: Yes     Birth control/protection: See Surgical Hx     Family History   Problem Relation Age of Onset    Hypertension Paternal Grandfather     Heart attack Paternal Grandfather     Cancer Maternal Grandfather     Hypertension Father     Asthma Father      Review of patient's allergies indicates:   Allergen Reactions    Cockroach     Grass pollen-bermuda, standard     Grass pollen-veronica, standard     Latex, natural rubber     Peanuts [peanut (legumes)] Other (See Comments)     Allergy testing    Wheat containing prod Other (See Comments)     Allergy testing        Objective:  Vitals:    08/17/23 1408 08/17/23 1410   BP: 121/75    Pulse: 109    Weight: 64.9 kg (143 lb)    Height: 5' 3" (1.6 m)    PainSc:   8   8         Physical Exam  Vitals and nursing note reviewed. Exam conducted with a chaperone present.   Constitutional:       General: She is awake. She is not in acute distress.     Appearance: Normal appearance. She is not ill-appearing or diaphoretic.   HENT:      Head: Normocephalic and atraumatic.      Nose: Nose normal.      Mouth/Throat:      Mouth: Mucous membranes are moist.      Pharynx: Oropharynx is clear.   Eyes:      Conjunctiva/sclera: Conjunctivae normal.      Pupils: Pupils are equal, round, and reactive to light.   Cardiovascular:      Rate and Rhythm: Normal rate.   Pulmonary:      Effort: Pulmonary effort is normal. No respiratory distress.   Abdominal:      Palpations: Abdomen is soft.      Tenderness: There is no guarding.   Musculoskeletal:         General: Normal range of motion.      Cervical back: " Normal range of motion and neck supple. Tenderness present. No rigidity.      Thoracic back: Tenderness present.      Lumbar back: Tenderness present.   Skin:     General: Skin is warm and dry.      Coloration: Skin is not jaundiced or pale.   Neurological:      General: No focal deficit present.      Mental Status: She is alert and oriented to person, place, and time. Mental status is at baseline.      Cranial Nerves: No cranial nerve deficit (II-XII).   Psychiatric:         Mood and Affect: Mood normal.         Behavior: Behavior normal. Behavior is cooperative.         Thought Content: Thought content normal.           FL Fluoro for Pain Management  See OP Notes for results.     IMPRESSION: See OP Notes for results.     This procedure was auto-finalized by: Virtual Radiologist         Office Visit on 06/06/2023   Component Date Value Ref Range Status    Case Report 06/06/2023    Final                    Value:Pap Cytology                                      Case: [id number]                                   Authorizing Provider:  Yanet Wang CNM     Collected:           06/06/2023 03:56 PM          Ordering Location:     [facility]       Received:            06/06/2023 03:56 PM                                 Clinic - Obstetrics and                                                                             Gynecology                                                                   First Screen:          FAIZA Siddiqui(ASCP)                                                    Specimen:    Liquid-Based Pap Test, Screening, Vagina                                                   Interpretation 06/06/2023 Negative              Final    General Categorization 06/06/2023 Negative for intraepithelial lesion or malignancy   Final    Specimen Adequacy 06/06/2023 Satisfactory for evaluation  Scant cellularity   Final    Clinical Information 06/06/2023    Final                    Value:This result  contains rich text formatting which cannot be displayed here.    Disclaimer 06/06/2023    Final                    Value:This result contains rich text formatting which cannot be displayed here.    Candida Species 06/06/2023 Positive (A)  Negative, Invalid Final    Gardnerella 06/06/2023 Positive (A)  Negative, Invalid Final    Trichomonas 06/06/2023 Negative  Negative, Invalid Final    Chlamydia by PCR 06/06/2023 Negative  Negative, Invalid Final    N. gonorrhoeae (GC) by PCR 06/06/2023 Negative  Negative, Invalid Final    HPV 16 06/06/2023 Negative  Negative, Invalid Final    HPV 18 06/06/2023 Negative  Negative, Invalid Final    HPV Other 06/06/2023 Negative  Negative, Invalid Final   Office Visit on 05/04/2023   Component Date Value Ref Range Status    POC Amphetamines 05/04/2023 Negative  Negative, Inconclusive Final    POC Barbiturates 05/04/2023 Negative  Negative, Inconclusive Final    POC Benzodiazepines 05/04/2023 Negative  Negative, Inconclusive Final    POC Cocaine 05/04/2023 Negative  Negative, Inconclusive Final    POC THC 05/04/2023 Negative  Negative, Inconclusive Final    POC Methadone 05/04/2023 Negative  Negative, Inconclusive Final    POC Methamphetamine 05/04/2023 Negative  Negative, Inconclusive Final    POC Opiates 05/04/2023 Negative  Negative, Inconclusive Final    POC Oxycodone 05/04/2023 Negative  Negative, Inconclusive Final    POC Phencyclidine 05/04/2023 Negative  Negative, Inconclusive Final    POC Methylenedioxymethamphetamine * 05/04/2023 Negative  Negative, Inconclusive Final    POC Tricyclic Antidepressants 05/04/2023 Negative  Negative, Inconclusive Final    POC Buprenorphine 05/04/2023 Negative   Final     Acceptable 05/04/2023 Yes   Final    POC Temperature (Urine) 05/04/2023 90   Final    pH, UA 05/04/2023 6.0  5.0 to 8.0 pH Units Final    Creatinine, Urine 05/04/2023 36  28 - 219 mg/dL Final    6-Acetylmorphine 05/04/2023 Negative  10 ng/mL Final     7-Aminoclonazepam 05/04/2023 Negative  Negative 25 ng/mL Final    a-Hydroxyalprazolam 05/04/2023 Negative  Negative 25 ng/mL Final    Acetyl Fentanyl 05/04/2023 Negative  2.5 ng/mL Final    Acetyl Norfentanyl Oxalate 05/04/2023 Negative  5 ng/mL Final    Benzoylecgonine 05/04/2023 Negative  100 ng/mL Final    Buprenorphine 05/04/2023 Negative  25 ng/mL Final    Codeine 05/04/2023 Negative  25 ng/mL Final    EDDP 05/04/2023 Negative  25 ng/mL Final    Fentanyl 05/04/2023 Negative  2.5 ng/mL Final    Hydrocodone 05/04/2023 >250.0 (H)  <25.0 25 ng/mL Final    Hydromorphone 05/04/2023 163.9 (H)  <25.0 25 ng/mL Final    Lorazepam 05/04/2023 Negative  25 ng/mL Final    Morphine 05/04/2023 Negative  25 ng/mL Final    Norbuprenorphine 05/04/2023 Negative  25 ng/mL Final    Nordiazepam 05/04/2023 Negative  25 ng/mL Final    Norfentanyl Oxalate 05/04/2023 Negative  5 ng/mL Final    Norhydrocodone 05/04/2023 >500.0 (H)  <50.0 50 ng/mL Final    Noroxycodone HCL 05/04/2023 Negative  50 ng/mL Final    Oxazepam 05/04/2023 Negative  25 ng/mL Final    Oxymorphone 05/04/2023 Negative  25 ng/mL Final    Tapentadol 05/04/2023 Negative  25 ng/mL Final    Temazepam 05/04/2023 Negative  25 ng/mL Final    THC-COOH 05/04/2023 Negative  25 ng/mL Final    Tramadol 05/04/2023 Negative  100 ng/mL Final    Amphetamine, Urine 05/04/2023 Negative  Negative Final    Methamphetamines, Urine 05/04/2023 Negative  Negative Final    Methadone, Urine 05/04/2023 Negative  Negative 25 ng/mL Final    Oxycodone, Urine 05/04/2023 Negative  Negative 25 ng/mL Final    Specific Gravity, UA 05/04/2023 1.005  <=1.030 Final         Orders Placed This Encounter   Procedures    POCT Urine Drug Screen Presump     Interpretive Information:     Negative:  No drug detected at the cut off level.   Positive:  This result represents presumptive positive for the   tested drug, other substances may yield a positive response other   than the analyte of interest. This result  should be utilized for   diagnostic purpose only. Confirmation testing will be performed upon physician request only.              Requested Prescriptions     Signed Prescriptions Disp Refills    tiZANidine 4 mg Cap 60 capsule 1     Sig: Take 1 capsule by mouth every 12 (twelve) hours.    HYDROcodone-acetaminophen (NORCO)  mg per tablet 90 tablet 0     Sig: Take 1 tablet by mouth every 8 (eight) hours.    HYDROcodone-acetaminophen (NORCO)  mg per tablet 90 tablet 0     Sig: Take 1 tablet by mouth every 8 (eight) hours.    baclofen (LIORESAL) 10 MG tablet 90 tablet 1     Sig: Take 1 tablet (10 mg total) by mouth 3 (three) times daily.       Assessment:     1. Trochanteric bursitis of right hip    2. Cervical radiculopathy    3. Multiple sclerosis    4. Lumbosacral spondylosis without myelopathy    5. Myalgia    6. Encounter for long-term (current) use of other medications         A's of Opioid Risk Assessment  Activity:Patient can perform ADL.   Analgesia:Patients pain is partially controlled by current medication. Patient has tried OTC medications such as Tylenol and Ibuprofen with out relief.   Adverse Effects: Patient denies constipation or sedation.  Aberrant Behavior:  reviewed with no aberrant drug seeking/taking behavior.  Overdose reversal drug naloxone discussed    Drug screen reviewed      X-ray lumbar spine [facility] May 2021 multiple level degenerative changes no fracture noted    MRI [facility] lumbar spine degenerative changes multiple levels will place report under media      Plan:    Narcan January 2023    Definitive drug screen May 4, 2023 consistent hydrocodone and metabolite    Follows Neurology multiple sclerosis [facility]     Complaining right lateral thigh pain pointing to her right trochanteric area worse with sitting or lying on her right side    Very tender right trochanteric area right trochanteric bursitis    Requesting right trochanteric injection     Will request prior  authorization right trochanteric injection cervical spine trigger point injection    Has a long history neck and back pain muscle skeletal facet joint in nature  Pain is consistent with trigger-point pattern  Has pain with range of motion cervical spine  Because of pain which limits activity does have some deconditioning in this area due to pain  Focal tenderness paraspinous muscle in this area  Multiple multilevel band paraspinous muscle in this area  Taut response to snapping palpation in this area  Has tried over-the-counter anti-inflammatories tried home exercise program physical therapy none have alleviated the discomfort    Continues complaint cervical spine discomfort myalgia type pain considering cervical trigger point injections    Continue current medication    Continue home exercise program as directed    Follow-up 2 months     Dr. Gaston, January 2024    Bring original prescription medication bottles/container/box with labels to each visit

## 2023-08-17 ENCOUNTER — OFFICE VISIT (OUTPATIENT)
Dept: PAIN MEDICINE | Facility: CLINIC | Age: 43
End: 2023-08-17
Payer: COMMERCIAL

## 2023-08-17 VITALS
WEIGHT: 143 LBS | BODY MASS INDEX: 25.34 KG/M2 | HEIGHT: 63 IN | SYSTOLIC BLOOD PRESSURE: 121 MMHG | HEART RATE: 109 BPM | DIASTOLIC BLOOD PRESSURE: 75 MMHG

## 2023-08-17 DIAGNOSIS — M54.12 CERVICAL RADICULOPATHY: Chronic | ICD-10-CM

## 2023-08-17 DIAGNOSIS — Z79.899 ENCOUNTER FOR LONG-TERM (CURRENT) USE OF OTHER MEDICATIONS: ICD-10-CM

## 2023-08-17 DIAGNOSIS — M70.61 TROCHANTERIC BURSITIS OF RIGHT HIP: Primary | Chronic | ICD-10-CM

## 2023-08-17 DIAGNOSIS — G35 MULTIPLE SCLEROSIS: Chronic | ICD-10-CM

## 2023-08-17 DIAGNOSIS — M79.10 MYALGIA: Chronic | ICD-10-CM

## 2023-08-17 DIAGNOSIS — M47.817 LUMBOSACRAL SPONDYLOSIS WITHOUT MYELOPATHY: Chronic | ICD-10-CM

## 2023-08-17 PROCEDURE — 3008F PR BODY MASS INDEX (BMI) DOCUMENTED: ICD-10-PCS | Mod: CPTII,,, | Performed by: PHYSICIAN ASSISTANT

## 2023-08-17 PROCEDURE — 3074F PR MOST RECENT SYSTOLIC BLOOD PRESSURE < 130 MM HG: ICD-10-PCS | Mod: CPTII,,, | Performed by: PHYSICIAN ASSISTANT

## 2023-08-17 PROCEDURE — 3078F DIAST BP <80 MM HG: CPT | Mod: CPTII,,, | Performed by: PHYSICIAN ASSISTANT

## 2023-08-17 PROCEDURE — 99214 PR OFFICE/OUTPT VISIT, EST, LEVL IV, 30-39 MIN: ICD-10-PCS | Mod: S$PBB,,, | Performed by: PHYSICIAN ASSISTANT

## 2023-08-17 PROCEDURE — 1159F PR MEDICATION LIST DOCUMENTED IN MEDICAL RECORD: ICD-10-PCS | Mod: CPTII,,, | Performed by: PHYSICIAN ASSISTANT

## 2023-08-17 PROCEDURE — 80305 DRUG TEST PRSMV DIR OPT OBS: CPT | Mod: PBBFAC | Performed by: PHYSICIAN ASSISTANT

## 2023-08-17 PROCEDURE — 99214 OFFICE O/P EST MOD 30 MIN: CPT | Mod: S$PBB,,, | Performed by: PHYSICIAN ASSISTANT

## 2023-08-17 PROCEDURE — 99999PBSHW POCT URINE DRUG SCREEN PRESUMP: Mod: PBBFAC,,,

## 2023-08-17 PROCEDURE — 99214 OFFICE O/P EST MOD 30 MIN: CPT | Mod: PBBFAC | Performed by: PHYSICIAN ASSISTANT

## 2023-08-17 PROCEDURE — 1159F MED LIST DOCD IN RCRD: CPT | Mod: CPTII,,, | Performed by: PHYSICIAN ASSISTANT

## 2023-08-17 PROCEDURE — 3008F BODY MASS INDEX DOCD: CPT | Mod: CPTII,,, | Performed by: PHYSICIAN ASSISTANT

## 2023-08-17 PROCEDURE — 99999PBSHW POCT URINE DRUG SCREEN PRESUMP: ICD-10-PCS | Mod: PBBFAC,,,

## 2023-08-17 PROCEDURE — 3078F PR MOST RECENT DIASTOLIC BLOOD PRESSURE < 80 MM HG: ICD-10-PCS | Mod: CPTII,,, | Performed by: PHYSICIAN ASSISTANT

## 2023-08-17 PROCEDURE — 3074F SYST BP LT 130 MM HG: CPT | Mod: CPTII,,, | Performed by: PHYSICIAN ASSISTANT

## 2023-08-17 RX ORDER — HYDROCODONE BITARTRATE AND ACETAMINOPHEN 10; 325 MG/1; MG/1
1 TABLET ORAL EVERY 8 HOURS
Qty: 90 TABLET | Refills: 0 | Status: SHIPPED | OUTPATIENT
Start: 2023-08-23 | End: 2023-09-22

## 2023-08-17 RX ORDER — HYDROCODONE BITARTRATE AND ACETAMINOPHEN 10; 325 MG/1; MG/1
1 TABLET ORAL EVERY 8 HOURS
Qty: 90 TABLET | Refills: 0 | Status: SHIPPED | OUTPATIENT
Start: 2023-09-22 | End: 2023-10-13 | Stop reason: SDUPTHER

## 2023-08-17 RX ORDER — BACLOFEN 10 MG/1
10 TABLET ORAL 3 TIMES DAILY
Qty: 90 TABLET | Refills: 1 | Status: SHIPPED | OUTPATIENT
Start: 2023-08-17 | End: 2023-10-13 | Stop reason: SDUPTHER

## 2023-08-17 RX ORDER — TIZANIDINE HYDROCHLORIDE 4 MG/1
1 CAPSULE, GELATIN COATED ORAL EVERY 12 HOURS
Qty: 60 CAPSULE | Refills: 1 | Status: SHIPPED | OUTPATIENT
Start: 2023-08-17 | End: 2023-10-13 | Stop reason: SDUPTHER

## 2023-08-17 NOTE — PROCEDURES
Large Joint Aspiration/Injection: R greater trochanteric bursa    Date/Time: 8/17/2023 1:45 PM    Performed by: Wyatt Franklin PA  Authorized by: Wyatt Franklin PA    Consent Done?:  Yes (Written)  Indications:  Arthritis and pain  Site marked: the procedure site was marked    Timeout: prior to procedure the correct patient, procedure, and site was verified    Prep: patient was prepped and draped in usual sterile fashion      Details:  Needle Size:  22 G  Approach:  Lateral  Location:  Hip  Site:  R greater trochanteric bursa  Medications:  2 mL BUPivacaine (PF) 0.25% (2.5 mg/ml) 0.25 % (2.5 mg/mL); 40 mg triamcinolone acetonide 40 mg/mL  Aspirate amount (mL):  0  Patient tolerance:  Patient tolerated the procedure well with no immediate complications     Bupivacaine 0.25% 25 mg/ 10 ml , 2 cc     Tolerated procedure well    No complication noted    Band-Aid was applied

## 2023-08-21 ENCOUNTER — CLINICAL SUPPORT (OUTPATIENT)
Dept: OTOLARYNGOLOGY | Facility: CLINIC | Age: 43
End: 2023-08-21
Payer: COMMERCIAL

## 2023-08-21 DIAGNOSIS — J30.89 OTHER ALLERGIC RHINITIS: ICD-10-CM

## 2023-08-21 DIAGNOSIS — J31.0 CHRONIC RHINITIS: ICD-10-CM

## 2023-08-21 DIAGNOSIS — J30.89 ALLERGIC RHINITIS DUE TO INSECT: ICD-10-CM

## 2023-08-21 DIAGNOSIS — J30.1 ALLERGIC RHINITIS DUE TO GRASS POLLEN: ICD-10-CM

## 2023-08-21 DIAGNOSIS — H65.23 CHRONIC SEROUS OTITIS MEDIA OF BOTH EARS: Primary | ICD-10-CM

## 2023-08-21 DIAGNOSIS — J30.1 ALLERGIC REACTION TO GRASS POLLEN: ICD-10-CM

## 2023-08-21 DIAGNOSIS — J30.5 ALLERGIC RHINITIS DUE TO FOOD: ICD-10-CM

## 2023-08-21 DIAGNOSIS — J30.1 SEASONAL ALLERGIC RHINITIS DUE TO POLLEN: ICD-10-CM

## 2023-08-21 PROCEDURE — 95115 IMMUNOTHERAPY ONE INJECTION: CPT | Mod: PBBFAC | Performed by: OTOLARYNGOLOGY

## 2023-08-21 PROCEDURE — 95165 PR PROFES SVC,IMMUNOTHER,SINGLE/MULT AGS: ICD-10-PCS | Mod: S$PBB,,, | Performed by: OTOLARYNGOLOGY

## 2023-08-21 PROCEDURE — 95165 ANTIGEN THERAPY SERVICES: CPT | Mod: PBBFAC | Performed by: OTOLARYNGOLOGY

## 2023-08-21 PROCEDURE — 95165 ANTIGEN THERAPY SERVICES: CPT | Mod: S$PBB,,, | Performed by: OTOLARYNGOLOGY

## 2023-08-21 NOTE — PROGRESS NOTES
Established patient with Dr. Francis. See previous note for written order. Allergy injections per Dr. Francis.   Vial test administered from reordered vial A.  10 minute vial test mm reactions  Administered left arm at 4:00 PM  3mm observed.  First dose of 0.02ml gvien.  20 minute mm reaction  4:01 PM   left arm; observation 5mm.

## 2023-08-30 ENCOUNTER — OFFICE VISIT (OUTPATIENT)
Dept: PRIMARY CARE CLINIC | Facility: CLINIC | Age: 43
End: 2023-08-30
Payer: COMMERCIAL

## 2023-08-30 VITALS
WEIGHT: 141 LBS | BODY MASS INDEX: 24.98 KG/M2 | HEIGHT: 63 IN | RESPIRATION RATE: 16 BRPM | TEMPERATURE: 98 F | SYSTOLIC BLOOD PRESSURE: 114 MMHG | HEART RATE: 60 BPM | OXYGEN SATURATION: 97 % | DIASTOLIC BLOOD PRESSURE: 80 MMHG

## 2023-08-30 DIAGNOSIS — R30.0 DYSURIA: Primary | ICD-10-CM

## 2023-08-30 DIAGNOSIS — J40 BRONCHITIS: ICD-10-CM

## 2023-08-30 PROBLEM — J20.9 ACUTE BRONCHITIS: Status: RESOLVED | Noted: 2021-03-24 | Resolved: 2023-08-30

## 2023-08-30 PROBLEM — G43.701 CHRONIC MIGRAINE WITHOUT AURA WITH STATUS MIGRAINOSUS, NOT INTRACTABLE: Status: ACTIVE | Noted: 2017-06-21

## 2023-08-30 PROBLEM — F32.A DEPRESSIVE DISORDER: Status: ACTIVE | Noted: 2023-08-30

## 2023-08-30 PROBLEM — M47.817 LUMBOSACRAL SPONDYLOSIS WITHOUT MYELOPATHY: Chronic | Status: RESOLVED | Noted: 2022-07-27 | Resolved: 2023-08-30

## 2023-08-30 PROBLEM — Z86.69 HISTORY OF MIGRAINE: Status: ACTIVE | Noted: 2023-08-30

## 2023-08-30 PROBLEM — G35 HISTORY OF MULTIPLE SCLEROSIS: Status: RESOLVED | Noted: 2023-08-30 | Resolved: 2023-08-30

## 2023-08-30 PROBLEM — G89.4 CHRONIC PAIN DISORDER: Status: ACTIVE | Noted: 2023-08-30

## 2023-08-30 PROBLEM — F41.9 ANXIETY: Status: ACTIVE | Noted: 2023-08-30

## 2023-08-30 PROBLEM — J45.909 ASTHMA: Status: ACTIVE | Noted: 2023-08-30

## 2023-08-30 PROBLEM — Z86.69 HISTORY OF MIGRAINE: Status: RESOLVED | Noted: 2023-08-30 | Resolved: 2023-08-30

## 2023-08-30 PROBLEM — G35 HISTORY OF MULTIPLE SCLEROSIS: Status: ACTIVE | Noted: 2023-08-30

## 2023-08-30 PROBLEM — H66.93 CHRONIC OTITIS MEDIA OF BOTH EARS: Status: RESOLVED | Noted: 2023-02-17 | Resolved: 2023-08-30

## 2023-08-30 PROCEDURE — 3008F PR BODY MASS INDEX (BMI) DOCUMENTED: ICD-10-PCS | Mod: CPTII,,, | Performed by: NURSE PRACTITIONER

## 2023-08-30 PROCEDURE — 3074F SYST BP LT 130 MM HG: CPT | Mod: CPTII,,, | Performed by: NURSE PRACTITIONER

## 2023-08-30 PROCEDURE — 1159F PR MEDICATION LIST DOCUMENTED IN MEDICAL RECORD: ICD-10-PCS | Mod: CPTII,,, | Performed by: NURSE PRACTITIONER

## 2023-08-30 PROCEDURE — 99213 OFFICE O/P EST LOW 20 MIN: CPT | Mod: ,,, | Performed by: NURSE PRACTITIONER

## 2023-08-30 PROCEDURE — 99213 PR OFFICE/OUTPT VISIT, EST, LEVL III, 20-29 MIN: ICD-10-PCS | Mod: ,,, | Performed by: NURSE PRACTITIONER

## 2023-08-30 PROCEDURE — 1160F PR REVIEW ALL MEDS BY PRESCRIBER/CLIN PHARMACIST DOCUMENTED: ICD-10-PCS | Mod: CPTII,,, | Performed by: NURSE PRACTITIONER

## 2023-08-30 PROCEDURE — 3079F DIAST BP 80-89 MM HG: CPT | Mod: CPTII,,, | Performed by: NURSE PRACTITIONER

## 2023-08-30 PROCEDURE — 3074F PR MOST RECENT SYSTOLIC BLOOD PRESSURE < 130 MM HG: ICD-10-PCS | Mod: CPTII,,, | Performed by: NURSE PRACTITIONER

## 2023-08-30 PROCEDURE — 1159F MED LIST DOCD IN RCRD: CPT | Mod: CPTII,,, | Performed by: NURSE PRACTITIONER

## 2023-08-30 PROCEDURE — 3008F BODY MASS INDEX DOCD: CPT | Mod: CPTII,,, | Performed by: NURSE PRACTITIONER

## 2023-08-30 PROCEDURE — 1160F RVW MEDS BY RX/DR IN RCRD: CPT | Mod: CPTII,,, | Performed by: NURSE PRACTITIONER

## 2023-08-30 PROCEDURE — 3079F PR MOST RECENT DIASTOLIC BLOOD PRESSURE 80-89 MM HG: ICD-10-PCS | Mod: CPTII,,, | Performed by: NURSE PRACTITIONER

## 2023-08-30 RX ORDER — CEPHALEXIN 500 MG/1
500 CAPSULE ORAL EVERY 6 HOURS
Qty: 40 CAPSULE | Refills: 0 | Status: SHIPPED | OUTPATIENT
Start: 2023-08-30 | End: 2023-09-07 | Stop reason: ALTCHOICE

## 2023-08-30 RX ORDER — BUPROPION HYDROCHLORIDE 150 MG/1
150 TABLET ORAL EVERY MORNING
COMMUNITY

## 2023-08-30 RX ORDER — ARIPIPRAZOLE 20 MG/1
TABLET ORAL
COMMUNITY
Start: 2023-04-25 | End: 2023-10-21

## 2023-08-30 RX ORDER — ALBUTEROL SULFATE 90 UG/1
2 AEROSOL, METERED RESPIRATORY (INHALATION) EVERY 6 HOURS PRN
Qty: 18 G | Refills: 0 | Status: SHIPPED | OUTPATIENT
Start: 2023-08-30 | End: 2024-08-29

## 2023-08-30 NOTE — PROGRESS NOTES
Subjective     Patient ID: Pili Love is a 42 y.o. female.    Chief Complaint: Cough (Coughing since she had cvd 4 months ago.  ) and Urinary Tract Infection    Pt presents with cough x 4 months and UTI symptoms x several days.       Review of Systems   Constitutional:  Negative for activity change, appetite change, chills, fatigue and fever.   HENT:  Negative for nasal congestion, ear discharge, nosebleeds, postnasal drip, rhinorrhea, sinus pressure/congestion, sneezing, sore throat and tinnitus.    Eyes:  Negative for pain, discharge, redness and itching.   Respiratory:  Positive for cough. Negative for choking, chest tightness, shortness of breath and wheezing.    Cardiovascular:  Negative for chest pain.   Gastrointestinal:  Negative for abdominal distention, abdominal pain, blood in stool, change in bowel habit, constipation, diarrhea, nausea, vomiting and change in bowel habit.   Genitourinary:  Positive for dysuria. Negative for decreased urine volume, flank pain, frequency, menstrual irregularity and menstrual problem.   Musculoskeletal:  Negative for back pain and gait problem.   Integumentary:  Negative for wound, breast mass and breast discharge.   Allergic/Immunologic: Negative for immunocompromised state.   Neurological:  Negative for dizziness, light-headedness and headaches.   Psychiatric/Behavioral:  Negative for agitation, behavioral problems and hallucinations.    Breast: Negative for mass         Objective     Physical Exam  Vitals and nursing note reviewed.   Constitutional:       Appearance: Normal appearance.   Cardiovascular:      Rate and Rhythm: Normal rate and regular rhythm.      Heart sounds: Normal heart sounds.   Pulmonary:      Effort: Pulmonary effort is normal.      Breath sounds: Normal breath sounds.   Musculoskeletal:         General: Normal range of motion.   Neurological:      Mental Status: She is alert and oriented to person, place, and time.   Psychiatric:         Mood  and Affect: Mood normal.         Behavior: Behavior normal.            Assessment and Plan     1. Dysuria  -     Urinalysis, Reflex to Urine Culture; Future; Expected date: 08/30/2023  -     cephALEXin (KEFLEX) 500 MG capsule; Take 1 capsule (500 mg total) by mouth every 6 (six) hours. for 10 days  Dispense: 40 capsule; Refill: 0    2. Bronchitis  -     albuterol (VENTOLIN HFA) 90 mcg/actuation inhaler; Inhale 2 puffs into the lungs every 6 (six) hours as needed for Wheezing. Rescue  Dispense: 18 g; Refill: 0        Instructed pt she will have to see her PCP, neuro, and pain treatment for refills on regular medications. Will call pt with the UA results.         Follow up if symptoms worsen or fail to improve.

## 2023-08-31 ENCOUNTER — CLINICAL SUPPORT (OUTPATIENT)
Dept: OTOLARYNGOLOGY | Facility: CLINIC | Age: 43
End: 2023-08-31
Payer: COMMERCIAL

## 2023-08-31 DIAGNOSIS — J30.5 ALLERGIC RHINITIS DUE TO FOOD: ICD-10-CM

## 2023-08-31 DIAGNOSIS — J30.1 ALLERGIC REACTION TO GRASS POLLEN: ICD-10-CM

## 2023-08-31 DIAGNOSIS — J30.1 ALLERGIC RHINITIS DUE TO GRASS POLLEN: ICD-10-CM

## 2023-08-31 DIAGNOSIS — J30.1 SEASONAL ALLERGIC RHINITIS DUE TO POLLEN: Primary | ICD-10-CM

## 2023-08-31 DIAGNOSIS — J30.89 OTHER ALLERGIC RHINITIS: ICD-10-CM

## 2023-08-31 DIAGNOSIS — J30.89 ALLERGIC RHINITIS DUE TO INSECT: ICD-10-CM

## 2023-08-31 DIAGNOSIS — J31.0 CHRONIC RHINITIS: ICD-10-CM

## 2023-08-31 PROCEDURE — 95115 IMMUNOTHERAPY ONE INJECTION: CPT | Mod: PBBFAC | Performed by: OTOLARYNGOLOGY

## 2023-08-31 NOTE — PROGRESS NOTES
Established patient with Dr. Francis. See previous note for written order. Allergy injections per Dr. Francis.   20 minute mm reaction  3:12 PM   right arm; observation 5mm

## 2023-09-07 ENCOUNTER — CLINICAL SUPPORT (OUTPATIENT)
Dept: OTOLARYNGOLOGY | Facility: CLINIC | Age: 43
End: 2023-09-07
Payer: COMMERCIAL

## 2023-09-07 DIAGNOSIS — J30.89 OTHER ALLERGIC RHINITIS: ICD-10-CM

## 2023-09-07 DIAGNOSIS — J30.1 SEASONAL ALLERGIC RHINITIS DUE TO POLLEN: Primary | ICD-10-CM

## 2023-09-07 DIAGNOSIS — J30.89 ALLERGIC RHINITIS DUE TO INSECT: ICD-10-CM

## 2023-09-07 DIAGNOSIS — J31.0 CHRONIC RHINITIS: ICD-10-CM

## 2023-09-07 DIAGNOSIS — J30.1 ALLERGIC REACTION TO GRASS POLLEN: ICD-10-CM

## 2023-09-07 DIAGNOSIS — J30.5 ALLERGIC RHINITIS DUE TO FOOD: ICD-10-CM

## 2023-09-07 DIAGNOSIS — J30.1 ALLERGIC RHINITIS DUE TO GRASS POLLEN: ICD-10-CM

## 2023-09-07 DIAGNOSIS — N30.00 ACUTE CYSTITIS WITHOUT HEMATURIA: Primary | ICD-10-CM

## 2023-09-07 PROCEDURE — 95115 IMMUNOTHERAPY ONE INJECTION: CPT | Mod: PBBFAC | Performed by: OTOLARYNGOLOGY

## 2023-09-07 RX ORDER — NITROFURANTOIN 25; 75 MG/1; MG/1
100 CAPSULE ORAL 2 TIMES DAILY
Qty: 20 CAPSULE | Refills: 0 | Status: SHIPPED | OUTPATIENT
Start: 2023-09-07 | End: 2023-09-17

## 2023-09-07 NOTE — PROGRESS NOTES
Established patient with Dr. Francis. See previous note for written order. Allergy injections per Dr. Francis.   20 minute mm reaction  3:00 PM   right arm; observation 3mm

## 2023-09-14 ENCOUNTER — OFFICE VISIT (OUTPATIENT)
Dept: OTOLARYNGOLOGY | Facility: CLINIC | Age: 43
End: 2023-09-14
Payer: COMMERCIAL

## 2023-09-14 ENCOUNTER — CLINICAL SUPPORT (OUTPATIENT)
Dept: OTOLARYNGOLOGY | Facility: CLINIC | Age: 43
End: 2023-09-14
Payer: COMMERCIAL

## 2023-09-14 VITALS — BODY MASS INDEX: 24.98 KG/M2 | WEIGHT: 141 LBS | HEIGHT: 63 IN

## 2023-09-14 DIAGNOSIS — J30.89 ALLERGIC RHINITIS DUE TO INSECT: ICD-10-CM

## 2023-09-14 DIAGNOSIS — J30.89 OTHER ALLERGIC RHINITIS: ICD-10-CM

## 2023-09-14 DIAGNOSIS — H65.23 CHRONIC SEROUS OTITIS MEDIA OF BOTH EARS: ICD-10-CM

## 2023-09-14 DIAGNOSIS — J30.1 ALLERGIC REACTION TO GRASS POLLEN: ICD-10-CM

## 2023-09-14 DIAGNOSIS — J30.5 ALLERGIC RHINITIS DUE TO FOOD: ICD-10-CM

## 2023-09-14 DIAGNOSIS — J31.0 CHRONIC RHINITIS: ICD-10-CM

## 2023-09-14 DIAGNOSIS — J30.1 SEASONAL ALLERGIC RHINITIS DUE TO POLLEN: Primary | ICD-10-CM

## 2023-09-14 DIAGNOSIS — J30.1 ALLERGIC RHINITIS DUE TO GRASS POLLEN: ICD-10-CM

## 2023-09-14 PROCEDURE — 99214 PR OFFICE/OUTPT VISIT, EST, LEVL IV, 30-39 MIN: ICD-10-PCS | Mod: S$PBB,,, | Performed by: OTOLARYNGOLOGY

## 2023-09-14 PROCEDURE — 1160F PR REVIEW ALL MEDS BY PRESCRIBER/CLIN PHARMACIST DOCUMENTED: ICD-10-PCS | Mod: CPTII,,, | Performed by: OTOLARYNGOLOGY

## 2023-09-14 PROCEDURE — 3008F PR BODY MASS INDEX (BMI) DOCUMENTED: ICD-10-PCS | Mod: CPTII,,, | Performed by: OTOLARYNGOLOGY

## 2023-09-14 PROCEDURE — 95115 IMMUNOTHERAPY ONE INJECTION: CPT | Mod: PBBFAC | Performed by: OTOLARYNGOLOGY

## 2023-09-14 PROCEDURE — 1160F RVW MEDS BY RX/DR IN RCRD: CPT | Mod: CPTII,,, | Performed by: OTOLARYNGOLOGY

## 2023-09-14 PROCEDURE — 1159F MED LIST DOCD IN RCRD: CPT | Mod: CPTII,,, | Performed by: OTOLARYNGOLOGY

## 2023-09-14 PROCEDURE — 3008F BODY MASS INDEX DOCD: CPT | Mod: CPTII,,, | Performed by: OTOLARYNGOLOGY

## 2023-09-14 PROCEDURE — 99214 OFFICE O/P EST MOD 30 MIN: CPT | Mod: S$PBB,,, | Performed by: OTOLARYNGOLOGY

## 2023-09-14 PROCEDURE — 99214 OFFICE O/P EST MOD 30 MIN: CPT | Mod: PBBFAC | Performed by: OTOLARYNGOLOGY

## 2023-09-14 PROCEDURE — 1159F PR MEDICATION LIST DOCUMENTED IN MEDICAL RECORD: ICD-10-PCS | Mod: CPTII,,, | Performed by: OTOLARYNGOLOGY

## 2023-09-14 NOTE — PROGRESS NOTES
Subjective:       Patient ID: Pili Love is a 42 y.o. female.    Chief Complaint: Otitis Media (Follow-up on bilateral PE tubes. ) and Other (6 month follow-up on allergies. Pt is taking weekly allergy shots, without any problems.)    Otitis Media:    Associated symptoms: Ear pain.      Review of Systems   HENT:  Positive for ear discharge and ear pain.    All other systems reviewed and are negative.      Objective:      Physical Exam  General: NAD  Head: Normocephalic, atraumatic, no facial asymmetry/normal strength,  Ears: Both auricules normal in appearance, w/o deformities tympanic membranes left tube coming out right in place some drainage external auditory canals normal  Nose: External nose w/o deformities normal turbinates no drainage or inflammation  Oral Cavity: Lips, gums, floor of mouth, tongue hard palate, and buccal mucosa without mass/lesion  Oropharynx: Mucosa pink and moist, soft palate, posterior pharynx and oropharyngeal wall without mass/lesion  Neck: Supple, symmetric, trachea midline, no palpable mass/lesion, no palpable cervical lymphadenopathy  Skin: Warm and dry, no concerning lesions  Respiratory: Respirations even, unlabored   Assessment:       1. Seasonal allergic rhinitis due to pollen    2. Chronic serous otitis media of both ears        Plan:       Drops prn drainage   Continue allergy treatment   with shots   F/u 3 months

## 2023-09-14 NOTE — PROGRESS NOTES
Established patient with Dr. Francis. See previous note for written order. Allergy injections per Dr. Francis.   20 minute mm reaction  2:50 PM   left arm; observation 3mm

## 2023-09-21 ENCOUNTER — CLINICAL SUPPORT (OUTPATIENT)
Dept: OTOLARYNGOLOGY | Facility: CLINIC | Age: 43
End: 2023-09-21
Payer: COMMERCIAL

## 2023-09-21 DIAGNOSIS — H65.23 CHRONIC SEROUS OTITIS MEDIA OF BOTH EARS: ICD-10-CM

## 2023-09-21 DIAGNOSIS — J30.1 SEASONAL ALLERGIC RHINITIS DUE TO POLLEN: Primary | ICD-10-CM

## 2023-09-21 DIAGNOSIS — J30.1 ALLERGIC RHINITIS DUE TO GRASS POLLEN: ICD-10-CM

## 2023-09-21 DIAGNOSIS — J30.89 OTHER ALLERGIC RHINITIS: ICD-10-CM

## 2023-09-21 DIAGNOSIS — J30.1 ALLERGIC REACTION TO GRASS POLLEN: ICD-10-CM

## 2023-09-21 DIAGNOSIS — J30.5 ALLERGIC RHINITIS DUE TO FOOD: ICD-10-CM

## 2023-09-21 DIAGNOSIS — J31.0 CHRONIC RHINITIS: ICD-10-CM

## 2023-09-21 DIAGNOSIS — J30.89 ALLERGIC RHINITIS DUE TO INSECT: ICD-10-CM

## 2023-09-21 PROCEDURE — 95115 IMMUNOTHERAPY ONE INJECTION: CPT | Mod: PBBFAC | Performed by: OTOLARYNGOLOGY

## 2023-09-21 NOTE — PROGRESS NOTES
Established patient with Dr. Francis. See previous note for written order. Allergy injections per Dr. Francis.   20 minute mm reaction  3:26 PM   right arm; observation 4mm

## 2023-09-28 ENCOUNTER — CLINICAL SUPPORT (OUTPATIENT)
Dept: OTOLARYNGOLOGY | Facility: CLINIC | Age: 43
End: 2023-09-28
Payer: COMMERCIAL

## 2023-09-28 DIAGNOSIS — J30.89 OTHER ALLERGIC RHINITIS: ICD-10-CM

## 2023-09-28 DIAGNOSIS — J30.1 ALLERGIC REACTION TO GRASS POLLEN: ICD-10-CM

## 2023-09-28 DIAGNOSIS — H65.23 CHRONIC SEROUS OTITIS MEDIA OF BOTH EARS: ICD-10-CM

## 2023-09-28 DIAGNOSIS — J31.0 CHRONIC RHINITIS: ICD-10-CM

## 2023-09-28 DIAGNOSIS — J30.1 SEASONAL ALLERGIC RHINITIS DUE TO POLLEN: Primary | ICD-10-CM

## 2023-09-28 DIAGNOSIS — J30.5 ALLERGIC RHINITIS DUE TO FOOD: ICD-10-CM

## 2023-09-28 DIAGNOSIS — J30.89 ALLERGIC RHINITIS DUE TO INSECT: ICD-10-CM

## 2023-09-28 DIAGNOSIS — J30.1 ALLERGIC RHINITIS DUE TO GRASS POLLEN: ICD-10-CM

## 2023-09-28 PROCEDURE — 95115 IMMUNOTHERAPY ONE INJECTION: CPT | Mod: PBBFAC | Performed by: OTOLARYNGOLOGY

## 2023-09-28 NOTE — PROGRESS NOTES
Established patient with Dr. Francis. See previous note for written order. Allergy injections per Dr. Francis.   20 minute mm reaction  4:41 PM   right arm; observation 4mm

## 2023-10-12 ENCOUNTER — CLINICAL SUPPORT (OUTPATIENT)
Dept: OTOLARYNGOLOGY | Facility: CLINIC | Age: 43
End: 2023-10-12
Payer: COMMERCIAL

## 2023-10-12 DIAGNOSIS — J30.1 SEASONAL ALLERGIC RHINITIS DUE TO POLLEN: Primary | ICD-10-CM

## 2023-10-12 DIAGNOSIS — J30.5 ALLERGIC RHINITIS DUE TO FOOD: ICD-10-CM

## 2023-10-12 DIAGNOSIS — J30.1 ALLERGIC RHINITIS DUE TO GRASS POLLEN: ICD-10-CM

## 2023-10-12 DIAGNOSIS — J30.89 OTHER ALLERGIC RHINITIS: ICD-10-CM

## 2023-10-12 DIAGNOSIS — J30.1 ALLERGIC REACTION TO GRASS POLLEN: ICD-10-CM

## 2023-10-12 DIAGNOSIS — J31.0 CHRONIC RHINITIS: ICD-10-CM

## 2023-10-12 DIAGNOSIS — J30.89 ALLERGIC RHINITIS DUE TO INSECT: ICD-10-CM

## 2023-10-12 PROCEDURE — 95115 IMMUNOTHERAPY ONE INJECTION: CPT | Mod: PBBFAC | Performed by: OTOLARYNGOLOGY

## 2023-10-12 NOTE — PROGRESS NOTES
Established patient with Dr. Francis. See previous note for written order. Allergy injections per Dr. Francis.   20 minute mm reaction  3:37 PM   right arm; observation 5mm

## 2023-10-13 NOTE — PROGRESS NOTES
Subjective:         Patient ID: Pili Love is a 43 y.o. female.    Chief Complaint: Knee Pain, Neck Pain, and Low-back Pain        Pain  This is a chronic problem. The current episode started more than 1 year ago. The problem occurs daily. The problem has been waxing and waning. Associated symptoms include arthralgias, myalgias and neck pain. Pertinent negatives include no anorexia, chest pain, chills, coughing, diaphoresis, fever, sore throat, vertigo or vomiting.     Review of Systems   Constitutional:  Negative for activity change, appetite change, chills, diaphoresis and fever.   HENT:  Negative for drooling, ear discharge, ear pain, facial swelling, nosebleeds, sore throat, trouble swallowing, voice change and goiter.    Eyes:  Negative for photophobia, pain, discharge, redness and visual disturbance.   Respiratory:  Negative for apnea, cough, choking, chest tightness, shortness of breath, wheezing and stridor.    Cardiovascular:  Negative for chest pain, palpitations and leg swelling.   Gastrointestinal:  Negative for abdominal distention, anorexia, diarrhea, rectal pain, vomiting and fecal incontinence.   Endocrine: Negative for cold intolerance, heat intolerance, polydipsia, polyphagia and polyuria.   Genitourinary:  Negative for flank pain, frequency and hot flashes.   Musculoskeletal:  Positive for arthralgias, back pain, myalgias and neck pain.   Integumentary:  Negative for color change and pallor.   Allergic/Immunologic: Negative for immunocompromised state.   Neurological:  Negative for dizziness, vertigo, seizures, syncope, facial asymmetry, speech difficulty, light-headedness, memory loss and coordination difficulties.   Hematological:  Negative for adenopathy. Does not bruise/bleed easily.   Psychiatric/Behavioral:  Negative for agitation, behavioral problems, confusion, decreased concentration, dysphoric mood, hallucinations, self-injury and suicidal ideas. The patient is not nervous/anxious  and is not hyperactive.            Past Medical History:   Diagnosis Date    Acute bronchitis 3/24/2021    Anxiety     Asthma     Chronic otitis media of both ears 2023    Depression     Endometriosis, unspecified     History of migraine 2023    History of multiple sclerosis 2023    Lumbosacral spondylosis without myelopathy 2022    Migraine     Multiple sclerosis     Dr Antonio     Past Surgical History:   Procedure Laterality Date     bilateral cervical paraspinous muscle trigger point injection Bilateral [id number]    D Shows  x 4     SECTION      FOOT SURGERY Left     left toe fusion    HYSTERECTOMY      INJECTION OF ANESTHETIC AGENT AROUND MEDIAL BRANCH NERVES INNERVATING LUMBAR FACET JOINT Bilateral 2022    Procedure: BLOCK, NERVE, FACET JOINT, LUMBAR, MEDIAL BRANCH;  Surgeon: Twyla Gaston MD;  Location: [facility];  Service: Pain Management;  Laterality: Bilateral;    INJECTION OF ANESTHETIC AGENT AROUND MEDIAL BRANCH NERVES INNERVATING LUMBAR FACET JOINT Bilateral 11/10/2022    Procedure: Block-nerve-medial branch-lumbar, bilateral L4 through S1;  Surgeon: Twyla Gaston MD;  Location: [facility];  Service: Pain Management;  Laterality: Bilateral;  patient will have to be tested    KNEE CARTILAGE SURGERY Right     LUMBAR PUNCTURE      Dr Trejo    MYRINGOTOMY WITH INSERTION OF VENTILATION TUBE Bilateral 2023    Procedure: MYRINGOTOMY, WITH TYMPANOSTOMY TUBE INSERTION;  Surgeon: Gagan Francis MD;  Location: [facility];  Service: ENT;  Laterality: Bilateral;    RADIOFREQUENCY ABLATION OF LUMBAR MEDIAL BRANCH NERVE AT SINGLE LEVEL Right 12/15/2022    Procedure: Radiofrequency Ablation, Nerve, Spinal, Lumbar, Medial Branch, Level L4-S1;  Surgeon: Twyla Gaston MD;  [facility];  Service: Pain Management;  Laterality: Right;  cong left after right side is done    RADIOFREQUENCY ABLATION OF LUMBAR MEDIAL BRANCH NERVE AT SINGLE  "LEVEL Left 1/5/2023    Procedure: RADIOFREQUENCY ABLATION, NERVE, SPINAL, LUMBAR, MEDIAL BRANCH, 1 LEVEL;  Surgeon: Twyla Gaston MD;  Location: [facility];  Service: Pain Management;  Laterality: Left;  Left L4-S1 RFTC. Pt had right on 12/15    Right C3-C7 FI Right 04/17/2018    Dr Trejo    TUBAL LIGATION      VAGINAL DELIVERY       Social History     Socioeconomic History    Marital status: Single   Tobacco Use    Smoking status: Never    Smokeless tobacco: Never   Substance and Sexual Activity    Alcohol use: Never    Drug use: Never    Sexual activity: Yes     Birth control/protection: See Surgical Hx     Social Determinants of Health     Physical Activity: Inactive (8/30/2023)    Exercise Vital Sign     Days of Exercise per Week: 0 days     Minutes of Exercise per Session: 0 min   Stress: No Stress Concern Present (8/30/2023)    [employer] [place] [employer] - Occupational Stress Questionnaire     Feeling of Stress : Not at all     Family History   Problem Relation Age of Onset    Hypertension Paternal Grandfather     Heart attack Paternal Grandfather     Cancer Maternal Grandfather     Hypertension Father     Asthma Father      Review of patient's allergies indicates:   Allergen Reactions    Cockroach     Grass pollen-bermuda, standard     Grass pollen-veronica, standard     Latex, natural rubber     Peanuts [peanut (legumes)] Other (See Comments)     Allergy testing    Penicillins     Wheat containing prod Other (See Comments)     Allergy testing        Objective:  Vitals:    10/17/23 1413 10/17/23 1501   BP: 114/72 111/78   Pulse: 90 84   Resp: 18    Weight: 64.9 kg (143 lb)    Height: 5' 3" (1.6 m)    PainSc:   6          Physical Exam  Vitals and nursing note reviewed. Exam conducted with a chaperone present.   Constitutional:       General: She is awake. She is not in acute distress.     Appearance: Normal appearance. She is not ill-appearing or diaphoretic.   HENT:      Head: " Normocephalic and atraumatic.      Nose: Nose normal.      Mouth/Throat:      Mouth: Mucous membranes are moist.      Pharynx: Oropharynx is clear.   Eyes:      Conjunctiva/sclera: Conjunctivae normal.      Pupils: Pupils are equal, round, and reactive to light.   Cardiovascular:      Rate and Rhythm: Normal rate.   Pulmonary:      Effort: Pulmonary effort is normal. No respiratory distress.   Abdominal:      Palpations: Abdomen is soft.      Tenderness: There is no guarding.   Musculoskeletal:         General: Normal range of motion.      Cervical back: Normal range of motion and neck supple. Tenderness present. No rigidity.      Thoracic back: Tenderness present.      Lumbar back: Tenderness present.   Skin:     General: Skin is warm and dry.      Coloration: Skin is not jaundiced or pale.   Neurological:      General: No focal deficit present.      Mental Status: She is alert and oriented to person, place, and time. Mental status is at baseline.      Cranial Nerves: No cranial nerve deficit (II-XII).   Psychiatric:         Mood and Affect: Mood normal.         Behavior: Behavior normal. Behavior is cooperative.         Thought Content: Thought content normal.         FL Fluoro for Pain Management  See OP Notes for results.     IMPRESSION: See OP Notes for results.     This procedure was auto-finalized by: Virtual Radiologist         Lab Visit on 08/30/2023   Component Date Value Ref Range Status    Color, UA 09/05/2023 Light Orange (A)  Colorless, Straw, Yellow, Light Yellow, Dark Yellow Final    Clarity, UA 09/05/2023 Turbid  Clear Final    pH, UA 09/05/2023 5.5  5.0 to 8.0 pH Units Final    Leukocytes, UA 09/05/2023 Moderate (A)  Negative Final    Nitrites, UA 09/05/2023 Positive (A)  Negative Final    Protein, UA 09/05/2023 Negative  Negative Final    Glucose, UA 09/05/2023 Normal  Normal mg/dL Final    Ketones, UA 09/05/2023 Negative  Negative mg/dL Final    Urobilinogen, UA 09/05/2023 Normal  0.2, 1.0,  Normal mg/dL Final    Bilirubin, UA 09/05/2023 Negative  Negative Final    Blood, UA 09/05/2023 Negative  Negative Final    Specific Gravity, UA 09/05/2023 1.007  <=1.030 Final    WBC, UA 09/05/2023 28 (H)  <=5 /hpf Final    RBC, UA 09/05/2023 4 (H)  <=3 /hpf Final    Bacteria, UA 09/05/2023 Many (A)  None Seen /hpf Final    Squamous Epithelial Cells, UA 09/05/2023 Occasional (A)  None Seen /HPF Final    WBC Clumps 09/05/2023 Few (A)  None Seen /hpf Final    Mucous 09/05/2023 Occasional (A)  None Seen /LPF Final    Culture, Urine 09/05/2023 >100,000 Escherichia coli (A)   Final    Culture, Urine 09/05/2023 >100,000 Enterococcus faecalis (A)   Final   Office Visit on 08/17/2023   Component Date Value Ref Range Status    POC Amphetamines 08/17/2023 Negative  Negative, Inconclusive Final    POC Barbiturates 08/17/2023 Negative  Negative, Inconclusive Final    POC Benzodiazepines 08/17/2023 Negative  Negative, Inconclusive Final    POC Cocaine 08/17/2023 Negative  Negative, Inconclusive Final    POC THC 08/17/2023 Negative  Negative, Inconclusive Final    POC Methadone 08/17/2023 Negative  Negative, Inconclusive Final    POC Methamphetamine 08/17/2023 Negative  Negative, Inconclusive Final    POC Opiates 08/17/2023 Presumptive Positive (A)  Negative, Inconclusive Final    POC Oxycodone 08/17/2023 Negative  Negative, Inconclusive Final    POC Phencyclidine 08/17/2023 Negative  Negative, Inconclusive Final    POC Methylenedioxymethamphetamine * 08/17/2023 Negative  Negative, Inconclusive Final    POC Tricyclic Antidepressants 08/17/2023 Negative  Negative, Inconclusive Final    POC Buprenorphine 08/17/2023 Negative   Final     Acceptable 08/17/2023 Yes   Final    POC Temperature (Urine) 08/17/2023 90   Final   Office Visit on 06/06/2023   Component Date Value Ref Range Status    Case Report 06/06/2023    Final                    Value:Pap Cytology                                      Case: [id number]                                    Authorizing Provider:  Yanet Wang CNM     Collected:           06/06/2023 03:56 PM          Ordering Location:     [facility]       Received:            06/06/2023 03:56 PM                                 Clinic - Obstetrics and                                                                             Gynecology                                                                   First Screen:          FAIZA Siddiqui(ASCP)                                                    Specimen:    Liquid-Based Pap Test, Screening, Vagina                                                   Interpretation 06/06/2023 Negative              Final    General Categorization 06/06/2023 Negative for intraepithelial lesion or malignancy   Final    Specimen Adequacy 06/06/2023 Satisfactory for evaluation  Scant cellularity   Final    Clinical Information 06/06/2023    Final                    Value:This result contains rich text formatting which cannot be displayed here.    Disclaimer 06/06/2023    Final                    Value:This result contains rich text formatting which cannot be displayed here.    Candida Species 06/06/2023 Positive (A)  Negative, Invalid Final    Gardnerella 06/06/2023 Positive (A)  Negative, Invalid Final    Trichomonas 06/06/2023 Negative  Negative, Invalid Final    Chlamydia by PCR 06/06/2023 Negative  Negative, Invalid Final    N. gonorrhoeae (GC) by PCR 06/06/2023 Negative  Negative, Invalid Final    HPV 16 06/06/2023 Negative  Negative, Invalid Final    HPV 18 06/06/2023 Negative  Negative, Invalid Final    HPV Other 06/06/2023 Negative  Negative, Invalid Final   Office Visit on 05/04/2023   Component Date Value Ref Range Status    POC Amphetamines 05/04/2023 Negative  Negative, Inconclusive Final    POC Barbiturates 05/04/2023 Negative  Negative, Inconclusive Final    POC Benzodiazepines 05/04/2023 Negative  Negative, Inconclusive Final    POC Cocaine 05/04/2023  Negative  Negative, Inconclusive Final    POC THC 05/04/2023 Negative  Negative, Inconclusive Final    POC Methadone 05/04/2023 Negative  Negative, Inconclusive Final    POC Methamphetamine 05/04/2023 Negative  Negative, Inconclusive Final    POC Opiates 05/04/2023 Negative  Negative, Inconclusive Final    POC Oxycodone 05/04/2023 Negative  Negative, Inconclusive Final    POC Phencyclidine 05/04/2023 Negative  Negative, Inconclusive Final    POC Methylenedioxymethamphetamine * 05/04/2023 Negative  Negative, Inconclusive Final    POC Tricyclic Antidepressants 05/04/2023 Negative  Negative, Inconclusive Final    POC Buprenorphine 05/04/2023 Negative   Final     Acceptable 05/04/2023 Yes   Final    POC Temperature (Urine) 05/04/2023 90   Final    pH, UA 05/04/2023 6.0  5.0 to 8.0 pH Units Final    Creatinine, Urine 05/04/2023 36  28 - 219 mg/dL Final    6-Acetylmorphine 05/04/2023 Negative  10 ng/mL Final    7-Aminoclonazepam 05/04/2023 Negative  Negative 25 ng/mL Final    a-Hydroxyalprazolam 05/04/2023 Negative  Negative 25 ng/mL Final    Acetyl Fentanyl 05/04/2023 Negative  2.5 ng/mL Final    Acetyl Norfentanyl Oxalate 05/04/2023 Negative  5 ng/mL Final    Benzoylecgonine 05/04/2023 Negative  100 ng/mL Final    Buprenorphine 05/04/2023 Negative  25 ng/mL Final    Codeine 05/04/2023 Negative  25 ng/mL Final    EDDP 05/04/2023 Negative  25 ng/mL Final    Fentanyl 05/04/2023 Negative  2.5 ng/mL Final    Hydrocodone 05/04/2023 >250.0 (H)  <25.0 25 ng/mL Final    Hydromorphone 05/04/2023 163.9 (H)  <25.0 25 ng/mL Final    Lorazepam 05/04/2023 Negative  25 ng/mL Final    Morphine 05/04/2023 Negative  25 ng/mL Final    Norbuprenorphine 05/04/2023 Negative  25 ng/mL Final    Nordiazepam 05/04/2023 Negative  25 ng/mL Final    Norfentanyl Oxalate 05/04/2023 Negative  5 ng/mL Final    Norhydrocodone 05/04/2023 >500.0 (H)  <50.0 50 ng/mL Final    Noroxycodone HCL 05/04/2023 Negative  50 ng/mL Final    Oxazepam  05/04/2023 Negative  25 ng/mL Final    Oxymorphone 05/04/2023 Negative  25 ng/mL Final    Tapentadol 05/04/2023 Negative  25 ng/mL Final    Temazepam 05/04/2023 Negative  25 ng/mL Final    THC-COOH 05/04/2023 Negative  25 ng/mL Final    Tramadol 05/04/2023 Negative  100 ng/mL Final    Amphetamine, Urine 05/04/2023 Negative  Negative Final    Methamphetamines, Urine 05/04/2023 Negative  Negative Final    Methadone, Urine 05/04/2023 Negative  Negative 25 ng/mL Final    Oxycodone, Urine 05/04/2023 Negative  Negative 25 ng/mL Final    Specific Gravity, UA 05/04/2023 1.005  <=1.030 Final         Orders Placed This Encounter   Procedures    Large Joint Aspiration/Injection: R knee     This order was created via procedure documentation    X-Ray Knee 3 View Right     Standing Status:   Future     Standing Expiration Date:   10/17/2024     Order Specific Question:   May the Radiologist modify the order per protocol to meet the clinical needs of the patient?     Answer:   Yes     Order Specific Question:   Release to patient     Answer:   Immediate           Requested Prescriptions     Signed Prescriptions Disp Refills    tiZANidine 4 mg Cap 60 capsule 1     Sig: Take 1 capsule by mouth every 12 (twelve) hours.    baclofen (LIORESAL) 10 MG tablet 90 tablet 1     Sig: Take 1 tablet (10 mg total) by mouth 3 (three) times daily.    traMADoL (ULTRAM) 50 mg tablet 120 tablet 1     Sig: Take 1 tablet (50 mg total) by mouth every 6 (six) hours as needed for Pain.       Assessment:     1. Chronic pain of right knee    2. Cervical radiculopathy    3. Multiple sclerosis    4. Lumbosacral spondylosis without myelopathy         A's of Opioid Risk Assessment  Activity:Patient can perform ADL.   Analgesia:Patients pain is partially controlled by current medication. Patient has tried OTC medications such as Tylenol and Ibuprofen with out relief.   Adverse Effects: Patient denies constipation or sedation.  Aberrant Behavior:  reviewed  with no aberrant drug seeking/taking behavior.  Overdose reversal drug naloxone discussed    Drug screen reviewed      X-ray lumbar spine [facility] May 2021 multiple level degenerative changes no fracture noted    MRI ARM lumbar spine degenerative changes multiple levels will place report under media      Plan:      October 19, 2023 patient requesting to hold Norco and use tramadol for now due to nationwide shortage    Tramadol 50 mg 1 p.o. q.6 hours        Narcan January 2023    Definitive drug screen May 4, 2023 consistent hydrocodone and metabolite    Follows Neurology multiple sclerosis [facility]     Complaint right knee pain ongoing for more than 3 months requesting right knee injection    Right knee injection today    X-ray right knee today    Continues complaint cervical spine discomfort myalgia type pain considering cervical trigger point injections    Continue current medication    Continue home exercise program as directed    Follow-up 2 months     Dr. Gaston, January 2024    Bring original prescription medication bottles/container/box with labels to each visit

## 2023-10-17 ENCOUNTER — OFFICE VISIT (OUTPATIENT)
Dept: PAIN MEDICINE | Facility: CLINIC | Age: 43
End: 2023-10-17
Payer: COMMERCIAL

## 2023-10-17 VITALS
HEART RATE: 84 BPM | BODY MASS INDEX: 25.34 KG/M2 | DIASTOLIC BLOOD PRESSURE: 78 MMHG | RESPIRATION RATE: 18 BRPM | SYSTOLIC BLOOD PRESSURE: 111 MMHG | HEIGHT: 63 IN | WEIGHT: 143 LBS

## 2023-10-17 DIAGNOSIS — G89.29 CHRONIC PAIN OF RIGHT KNEE: Primary | Chronic | ICD-10-CM

## 2023-10-17 DIAGNOSIS — G35 MULTIPLE SCLEROSIS: Chronic | ICD-10-CM

## 2023-10-17 DIAGNOSIS — M25.561 CHRONIC PAIN OF RIGHT KNEE: Primary | Chronic | ICD-10-CM

## 2023-10-17 DIAGNOSIS — M54.12 CERVICAL RADICULOPATHY: Chronic | ICD-10-CM

## 2023-10-17 DIAGNOSIS — M47.817 LUMBOSACRAL SPONDYLOSIS WITHOUT MYELOPATHY: Chronic | ICD-10-CM

## 2023-10-17 PROCEDURE — 3078F PR MOST RECENT DIASTOLIC BLOOD PRESSURE < 80 MM HG: ICD-10-PCS | Mod: CPTII,,, | Performed by: PHYSICIAN ASSISTANT

## 2023-10-17 PROCEDURE — 3008F BODY MASS INDEX DOCD: CPT | Mod: CPTII,,, | Performed by: PHYSICIAN ASSISTANT

## 2023-10-17 PROCEDURE — 99214 OFFICE O/P EST MOD 30 MIN: CPT | Mod: 25,S$PBB,, | Performed by: PHYSICIAN ASSISTANT

## 2023-10-17 PROCEDURE — 3008F PR BODY MASS INDEX (BMI) DOCUMENTED: ICD-10-PCS | Mod: CPTII,,, | Performed by: PHYSICIAN ASSISTANT

## 2023-10-17 PROCEDURE — 3074F PR MOST RECENT SYSTOLIC BLOOD PRESSURE < 130 MM HG: ICD-10-PCS | Mod: CPTII,,, | Performed by: PHYSICIAN ASSISTANT

## 2023-10-17 PROCEDURE — 20610 LARGE JOINT ASPIRATION/INJECTION: R KNEE: ICD-10-PCS | Mod: S$PBB,RT,, | Performed by: PHYSICIAN ASSISTANT

## 2023-10-17 PROCEDURE — 99215 OFFICE O/P EST HI 40 MIN: CPT | Mod: PBBFAC,25 | Performed by: PHYSICIAN ASSISTANT

## 2023-10-17 PROCEDURE — 3074F SYST BP LT 130 MM HG: CPT | Mod: CPTII,,, | Performed by: PHYSICIAN ASSISTANT

## 2023-10-17 PROCEDURE — 99214 PR OFFICE/OUTPT VISIT, EST, LEVL IV, 30-39 MIN: ICD-10-PCS | Mod: 25,S$PBB,, | Performed by: PHYSICIAN ASSISTANT

## 2023-10-17 PROCEDURE — 99999PBSHW PR PBB SHADOW TECHNICAL ONLY FILED TO HB: Mod: PBBFAC,,,

## 2023-10-17 PROCEDURE — 20610 DRAIN/INJ JOINT/BURSA W/O US: CPT | Mod: PBBFAC | Performed by: PHYSICIAN ASSISTANT

## 2023-10-17 PROCEDURE — 3078F DIAST BP <80 MM HG: CPT | Mod: CPTII,,, | Performed by: PHYSICIAN ASSISTANT

## 2023-10-17 PROCEDURE — 1159F PR MEDICATION LIST DOCUMENTED IN MEDICAL RECORD: ICD-10-PCS | Mod: CPTII,,, | Performed by: PHYSICIAN ASSISTANT

## 2023-10-17 PROCEDURE — 1159F MED LIST DOCD IN RCRD: CPT | Mod: CPTII,,, | Performed by: PHYSICIAN ASSISTANT

## 2023-10-17 PROCEDURE — 99999PBSHW PR PBB SHADOW TECHNICAL ONLY FILED TO HB: ICD-10-PCS | Mod: PBBFAC,,,

## 2023-10-17 RX ORDER — BUPIVACAINE HYDROCHLORIDE 2.5 MG/ML
2 INJECTION, SOLUTION EPIDURAL; INFILTRATION; INTRACAUDAL
Status: DISCONTINUED | OUTPATIENT
Start: 2023-10-17 | End: 2023-10-17 | Stop reason: HOSPADM

## 2023-10-17 RX ORDER — HYDROCODONE BITARTRATE AND ACETAMINOPHEN 10; 325 MG/1; MG/1
1 TABLET ORAL EVERY 8 HOURS
Qty: 90 TABLET | Refills: 0 | Status: SHIPPED | OUTPATIENT
Start: 2023-11-16 | End: 2023-10-19

## 2023-10-17 RX ORDER — BACLOFEN 10 MG/1
10 TABLET ORAL 3 TIMES DAILY
Qty: 90 TABLET | Refills: 1 | Status: SHIPPED | OUTPATIENT
Start: 2023-10-17 | End: 2023-12-12 | Stop reason: SDUPTHER

## 2023-10-17 RX ORDER — HYDROCODONE BITARTRATE AND ACETAMINOPHEN 10; 325 MG/1; MG/1
1 TABLET ORAL EVERY 8 HOURS
Qty: 90 TABLET | Refills: 0 | Status: SHIPPED | OUTPATIENT
Start: 2023-10-17 | End: 2023-10-19

## 2023-10-17 RX ORDER — TIZANIDINE HYDROCHLORIDE 4 MG/1
1 CAPSULE, GELATIN COATED ORAL EVERY 12 HOURS
Qty: 60 CAPSULE | Refills: 1 | Status: SHIPPED | OUTPATIENT
Start: 2023-10-17 | End: 2023-12-12 | Stop reason: SDUPTHER

## 2023-10-17 RX ORDER — TRIAMCINOLONE ACETONIDE 40 MG/ML
40 INJECTION, SUSPENSION INTRA-ARTICULAR; INTRAMUSCULAR
Status: DISCONTINUED | OUTPATIENT
Start: 2023-10-17 | End: 2023-10-17 | Stop reason: HOSPADM

## 2023-10-17 RX ADMIN — BUPIVACAINE HYDROCHLORIDE 2 ML: 2.5 INJECTION, SOLUTION EPIDURAL; INFILTRATION; INTRACAUDAL at 01:10

## 2023-10-17 RX ADMIN — TRIAMCINOLONE ACETONIDE 40 MG: 40 INJECTION, SUSPENSION INTRA-ARTICULAR; INTRAMUSCULAR at 01:10

## 2023-10-17 NOTE — PROCEDURES
Large Joint Aspiration/Injection: R knee    Date/Time: 10/17/2023 1:45 PM    Performed by: Wyatt Franklin PA  Authorized by: Wyatt Franklin PA    Consent Done?:  Yes (Written)  Indications:  Arthritis and pain  Site marked: the procedure site was marked    Timeout: prior to procedure the correct patient, procedure, and site was verified    Prep: patient was prepped and draped in usual sterile fashion      Details:  Needle Size:  22 G  Approach:  Lateral  Location:  Knee  Site:  R knee  Medications:  2 mL BUPivacaine (PF) 0.25% (2.5 mg/ml) 0.25 % (2.5 mg/mL); 40 mg triamcinolone acetonide 40 mg/mL  Aspirate amount (mL):  0  Patient tolerance:  Patient tolerated the procedure well with no immediate complications     Bupivacaine 0.25% 25 mg/ 10 ml , 2 cc     Tolerated procedure well    No complication noted    Band-Aid was applied

## 2023-10-18 ENCOUNTER — TELEPHONE (OUTPATIENT)
Dept: PAIN MEDICINE | Facility: CLINIC | Age: 43
End: 2023-10-18
Payer: COMMERCIAL

## 2023-10-18 NOTE — TELEPHONE ENCOUNTER
----- Message from Deanna Rebollar sent at 10/18/2023  1:07 PM CDT -----  Regarding: Patient Prescription  Patient called and stated she has called every pharmacy in [place] and had no luck finding her prescription. She was trying to see can she get prescribe something else to be able to get some pain relief. She stated you can give her a call back at [phone]    ROXY WALLIS   10/18/2023   3:17 PM   Attempted to call no answer, left message.

## 2023-10-19 ENCOUNTER — CLINICAL SUPPORT (OUTPATIENT)
Dept: OTOLARYNGOLOGY | Facility: CLINIC | Age: 43
End: 2023-10-19
Payer: COMMERCIAL

## 2023-10-19 DIAGNOSIS — J30.5 ALLERGIC RHINITIS DUE TO FOOD: ICD-10-CM

## 2023-10-19 DIAGNOSIS — J30.1 SEASONAL ALLERGIC RHINITIS DUE TO POLLEN: Primary | ICD-10-CM

## 2023-10-19 DIAGNOSIS — J30.89 OTHER ALLERGIC RHINITIS: ICD-10-CM

## 2023-10-19 DIAGNOSIS — J30.1 ALLERGIC RHINITIS DUE TO GRASS POLLEN: ICD-10-CM

## 2023-10-19 DIAGNOSIS — J31.0 CHRONIC RHINITIS: ICD-10-CM

## 2023-10-19 DIAGNOSIS — J30.1 ALLERGIC REACTION TO GRASS POLLEN: ICD-10-CM

## 2023-10-19 DIAGNOSIS — J30.89 ALLERGIC RHINITIS DUE TO INSECT: ICD-10-CM

## 2023-10-19 PROCEDURE — 95115 IMMUNOTHERAPY ONE INJECTION: CPT | Mod: PBBFAC | Performed by: OTOLARYNGOLOGY

## 2023-10-19 RX ORDER — TRAMADOL HYDROCHLORIDE 50 MG/1
50 TABLET ORAL EVERY 6 HOURS PRN
Qty: 120 TABLET | Refills: 1 | Status: SHIPPED | OUTPATIENT
Start: 2023-10-19 | End: 2023-10-19

## 2023-10-19 RX ORDER — TRAMADOL HYDROCHLORIDE 50 MG/1
50 TABLET ORAL EVERY 6 HOURS PRN
Qty: 120 TABLET | Refills: 1 | Status: SHIPPED | OUTPATIENT
Start: 2023-10-19 | End: 2024-02-08

## 2023-10-19 NOTE — PROGRESS NOTES
Established patient with Dr. Francis. See previous note for written order. Allergy injections per Dr. Francis.   20 minute mm reaction  3:06 PM   left arm; observation 4mm

## 2023-11-16 ENCOUNTER — CLINICAL SUPPORT (OUTPATIENT)
Dept: OTOLARYNGOLOGY | Facility: CLINIC | Age: 43
End: 2023-11-16
Payer: COMMERCIAL

## 2023-11-16 DIAGNOSIS — J31.0 CHRONIC RHINITIS: ICD-10-CM

## 2023-11-16 DIAGNOSIS — J30.1 ALLERGIC RHINITIS DUE TO GRASS POLLEN: ICD-10-CM

## 2023-11-16 DIAGNOSIS — J30.5 ALLERGIC RHINITIS DUE TO FOOD: ICD-10-CM

## 2023-11-16 DIAGNOSIS — J30.89 OTHER ALLERGIC RHINITIS: ICD-10-CM

## 2023-11-16 DIAGNOSIS — J30.1 ALLERGIC REACTION TO GRASS POLLEN: ICD-10-CM

## 2023-11-16 DIAGNOSIS — J30.1 SEASONAL ALLERGIC RHINITIS DUE TO POLLEN: Primary | ICD-10-CM

## 2023-11-16 DIAGNOSIS — J30.89 ALLERGIC RHINITIS DUE TO INSECT: ICD-10-CM

## 2023-11-16 PROCEDURE — 95165 ANTIGEN THERAPY SERVICES: CPT | Mod: S$PBB,,, | Performed by: OTOLARYNGOLOGY

## 2023-11-16 PROCEDURE — 95165 ANTIGEN THERAPY SERVICES: CPT | Mod: PBBFAC | Performed by: OTOLARYNGOLOGY

## 2023-11-16 PROCEDURE — 95115 IMMUNOTHERAPY ONE INJECTION: CPT | Mod: PBBFAC | Performed by: OTOLARYNGOLOGY

## 2023-11-16 PROCEDURE — 95165 PR PROFES SVC,IMMUNOTHER,SINGLE/MULT AGS: ICD-10-PCS | Mod: S$PBB,,, | Performed by: OTOLARYNGOLOGY

## 2023-11-16 NOTE — PROGRESS NOTES
Established patient with Dr. Francis. See previous note for written order. Allergy injections per Dr. Francis.   Vial test administered from new vial B.  10 minute vial test mm reactions  Administered right arm at 3:47 PM  4mm observed.  First dose of 0.02ml given.  20 minute mm reaction  3:47 PM   right arm; observation 4mm.

## 2023-11-30 ENCOUNTER — CLINICAL SUPPORT (OUTPATIENT)
Dept: OTOLARYNGOLOGY | Facility: CLINIC | Age: 43
End: 2023-11-30
Payer: COMMERCIAL

## 2023-11-30 DIAGNOSIS — J30.1 ALLERGIC RHINITIS DUE TO GRASS POLLEN: ICD-10-CM

## 2023-11-30 DIAGNOSIS — J30.5 ALLERGIC RHINITIS DUE TO FOOD: ICD-10-CM

## 2023-11-30 DIAGNOSIS — J30.1 SEASONAL ALLERGIC RHINITIS DUE TO POLLEN: Primary | ICD-10-CM

## 2023-11-30 DIAGNOSIS — J30.89 ALLERGIC RHINITIS DUE TO INSECT: ICD-10-CM

## 2023-11-30 DIAGNOSIS — J30.1 ALLERGIC REACTION TO GRASS POLLEN: ICD-10-CM

## 2023-11-30 DIAGNOSIS — J31.0 CHRONIC RHINITIS: ICD-10-CM

## 2023-11-30 DIAGNOSIS — J30.89 OTHER ALLERGIC RHINITIS: ICD-10-CM

## 2023-11-30 PROCEDURE — 95115 IMMUNOTHERAPY ONE INJECTION: CPT | Mod: PBBFAC | Performed by: OTOLARYNGOLOGY

## 2023-11-30 NOTE — PROGRESS NOTES
Established patient with Dr. Francis. See previous note for written order. Allergy injections per Dr. Francis.   20 minute mm reaction  4:19 PM   right arm; observation 5mm

## 2023-12-12 NOTE — PROGRESS NOTES
Subjective:         Patient ID: Pili Love is a 43 y.o. female.    Chief Complaint: Low-back Pain, Leg Pain, and Neck Pain        Pain  This is a chronic problem. The current episode started more than 1 year ago. The problem occurs daily. The problem has been unchanged. Associated symptoms include arthralgias, myalgias and neck pain. Pertinent negatives include no anorexia, chest pain, chills, coughing, diaphoresis, fever, sore throat, vertigo or vomiting.     Review of Systems   Constitutional:  Negative for activity change, appetite change, chills, diaphoresis and fever.   HENT:  Negative for drooling, ear discharge, ear pain, facial swelling, nosebleeds, sore throat, trouble swallowing, voice change and goiter.    Eyes:  Negative for photophobia, pain, discharge, redness and visual disturbance.   Respiratory:  Negative for apnea, cough, choking, chest tightness, shortness of breath, wheezing and stridor.    Cardiovascular:  Negative for chest pain, palpitations and leg swelling.   Gastrointestinal:  Negative for abdominal distention, anorexia, diarrhea, rectal pain, vomiting and fecal incontinence.   Endocrine: Negative for cold intolerance, heat intolerance, polydipsia, polyphagia and polyuria.   Genitourinary:  Negative for flank pain, frequency and hot flashes.   Musculoskeletal:  Positive for arthralgias, back pain, myalgias and neck pain.   Integumentary:  Negative for color change and pallor.   Allergic/Immunologic: Negative for immunocompromised state.   Neurological:  Negative for dizziness, vertigo, seizures, syncope, facial asymmetry, speech difficulty, light-headedness, memory loss and coordination difficulties.   Hematological:  Negative for adenopathy. Does not bruise/bleed easily.   Psychiatric/Behavioral:  Negative for agitation, behavioral problems, confusion, decreased concentration, dysphoric mood, hallucinations, self-injury and suicidal ideas. The patient is not nervous/anxious and is  not hyperactive.            Past Medical History:   Diagnosis Date    Acute bronchitis 3/24/2021    Anxiety     Asthma     Chronic otitis media of both ears 2023    Depression     Endometriosis, unspecified     History of migraine 2023    History of multiple sclerosis 2023    Lumbosacral spondylosis without myelopathy 2022    Migraine     Multiple sclerosis     Dr Antonio     Past Surgical History:   Procedure Laterality Date     bilateral cervical paraspinous muscle trigger point injection Bilateral [id number]    D Shows  x 4     SECTION      FOOT SURGERY Left     left toe fusion    HYSTERECTOMY      INJECTION OF ANESTHETIC AGENT AROUND MEDIAL BRANCH NERVES INNERVATING LUMBAR FACET JOINT Bilateral 2022    Procedure: BLOCK, NERVE, FACET JOINT, LUMBAR, MEDIAL BRANCH;  Surgeon: Twyla Gaston MD;  Location: [facility];  Service: Pain Management;  Laterality: Bilateral;    INJECTION OF ANESTHETIC AGENT AROUND MEDIAL BRANCH NERVES INNERVATING LUMBAR FACET JOINT Bilateral 11/10/2022    Procedure: Block-nerve-medial branch-lumbar, bilateral L4 through S1;  Surgeon: Twyla Gaston MD;  Location: [facility];  Service: Pain Management;  Laterality: Bilateral;  patient will have to be tested    KNEE CARTILAGE SURGERY Right     LUMBAR PUNCTURE      Dr Trejo    MYRINGOTOMY WITH INSERTION OF VENTILATION TUBE Bilateral 2023    Procedure: MYRINGOTOMY, WITH TYMPANOSTOMY TUBE INSERTION;  Surgeon: Gagan Francis MD;  Location: [facility];  Service: ENT;  Laterality: Bilateral;    RADIOFREQUENCY ABLATION OF LUMBAR MEDIAL BRANCH NERVE AT SINGLE LEVEL Right 12/15/2022    Procedure: Radiofrequency Ablation, Nerve, Spinal, Lumbar, Medial Branch, Level L4-S1;  Surgeon: Twyla Gaston MD;  [facility]: [facility];  Service: Pain Management;  Laterality: Right;  cong left after right side is done    RADIOFREQUENCY ABLATION OF LUMBAR MEDIAL BRANCH NERVE AT SINGLE LEVEL  "Left 1/5/2023    Procedure: RADIOFREQUENCY ABLATION, NERVE, SPINAL, LUMBAR, MEDIAL BRANCH, 1 LEVEL;  Surgeon: Twyla Gaston MD;  Location: [facility];  Service: Pain Management;  Laterality: Left;  Left L4-S1 RFTC. Pt had right on 12/15    Right C3-C7 FI Right 04/17/2018    Dr Trejo    TUBAL LIGATION      VAGINAL DELIVERY       Social History     Socioeconomic History    Marital status: Single   Tobacco Use    Smoking status: Never    Smokeless tobacco: Never   Substance and Sexual Activity    Alcohol use: Never    Drug use: Never    Sexual activity: Yes     Birth control/protection: See Surgical Hx     Social Determinants of Health     Physical Activity: Inactive (8/30/2023)    Exercise Vital Sign     Days of Exercise per Week: 0 days     Minutes of Exercise per Session: 0 min   Stress: No Stress Concern Present (8/30/2023)    [employer] - Occupational Stress Questionnaire     Feeling of Stress : Not at all     Family History   Problem Relation Age of Onset    Hypertension Paternal Grandfather     Heart attack Paternal Grandfather     Cancer Maternal Grandfather     Hypertension Father     Asthma Father      Review of patient's allergies indicates:   Allergen Reactions    Cockroach     Grass pollen-bermuda, standard     Grass pollen-veronica, standard     Latex, natural rubber     Peanuts [peanut (legumes)] Other (See Comments)     Allergy testing    Penicillins     Wheat containing prod Other (See Comments)     Allergy testing        Objective:  Vitals:    12/14/23 1046   BP: 115/88   Pulse: 107   Resp: 18   Weight: 59.4 kg (131 lb)   Height: 5' 3" (1.6 m)   PainSc:   8         Physical Exam  Vitals and nursing note reviewed. Exam conducted with a chaperone present.   Constitutional:       General: She is awake. She is not in acute distress.     Appearance: Normal appearance. She is not ill-appearing or diaphoretic.   HENT:      Head: Normocephalic and atraumatic.      Nose: " Nose normal.      Mouth/Throat:      Mouth: Mucous membranes are moist.      Pharynx: Oropharynx is clear.   Eyes:      Conjunctiva/sclera: Conjunctivae normal.      Pupils: Pupils are equal, round, and reactive to light.   Cardiovascular:      Rate and Rhythm: Normal rate.   Pulmonary:      Effort: Pulmonary effort is normal. No respiratory distress.   Abdominal:      Palpations: Abdomen is soft.      Tenderness: There is no guarding.   Musculoskeletal:         General: Normal range of motion.      Cervical back: Normal range of motion and neck supple. Tenderness present. No rigidity.      Thoracic back: Tenderness present.      Lumbar back: Tenderness present.   Skin:     General: Skin is warm and dry.      Coloration: Skin is not jaundiced or pale.   Neurological:      General: No focal deficit present.      Mental Status: She is alert and oriented to person, place, and time. Mental status is at baseline.      Cranial Nerves: No cranial nerve deficit (II-XII).   Psychiatric:         Mood and Affect: Mood normal.         Behavior: Behavior normal. Behavior is cooperative.         Thought Content: Thought content normal.           [place] Fluoro for Pain Management  See OP Notes for results.     IMPRESSION: See OP Notes for results.     This procedure was auto-finalized by: Virtual Radiologist         Lab Visit on 08/30/2023   Component Date Value Ref Range Status    Color, UA 09/05/2023 Light Orange (A)  Colorless, Straw, Yellow, Light Yellow, Dark Yellow Final    Clarity, UA 09/05/2023 Turbid  Clear Final    pH, UA 09/05/2023 5.5  5.0 to 8.0 pH Units Final    Leukocytes, UA 09/05/2023 Moderate (A)  Negative Final    Nitrites, UA 09/05/2023 Positive (A)  Negative Final    Protein, UA 09/05/2023 Negative  Negative Final    Glucose, UA 09/05/2023 Normal  Normal mg/dL Final    Ketones, UA 09/05/2023 Negative  Negative mg/dL Final    Urobilinogen, UA 09/05/2023 Normal  0.2, 1.0, Normal mg/dL Final    Bilirubin, UA  09/05/2023 Negative  Negative Final    Blood, UA 09/05/2023 Negative  Negative Final    Specific Gravity, UA 09/05/2023 1.007  <=1.030 Final    WBC, UA 09/05/2023 28 (H)  <=5 /hpf Final    RBC, UA 09/05/2023 4 (H)  <=3 /hpf Final    Bacteria, UA 09/05/2023 Many (A)  None Seen /hpf Final    Squamous Epithelial Cells, UA 09/05/2023 Occasional (A)  None Seen /HPF Final    WBC Clumps 09/05/2023 Few (A)  None Seen /hpf Final    Mucous 09/05/2023 Occasional (A)  None Seen /LPF Final    Culture, Urine 09/05/2023 >100,000 Escherichia coli (A)   Final    Culture, Urine 09/05/2023 >100,000 Enterococcus faecalis (A)   Final   Office Visit on 08/17/2023   Component Date Value Ref Range Status    POC Amphetamines 08/17/2023 Negative  Negative, Inconclusive Final    POC Barbiturates 08/17/2023 Negative  Negative, Inconclusive Final    POC Benzodiazepines 08/17/2023 Negative  Negative, Inconclusive Final    POC Cocaine 08/17/2023 Negative  Negative, Inconclusive Final    POC THC 08/17/2023 Negative  Negative, Inconclusive Final    POC Methadone 08/17/2023 Negative  Negative, Inconclusive Final    POC Methamphetamine 08/17/2023 Negative  Negative, Inconclusive Final    POC Opiates 08/17/2023 Presumptive Positive (A)  Negative, Inconclusive Final    POC Oxycodone 08/17/2023 Negative  Negative, Inconclusive Final    POC Phencyclidine 08/17/2023 Negative  Negative, Inconclusive Final    POC Methylenedioxymethamphetamine * 08/17/2023 Negative  Negative, Inconclusive Final    POC Tricyclic Antidepressants 08/17/2023 Negative  Negative, Inconclusive Final    POC Buprenorphine 08/17/2023 Negative   Final     Acceptable 08/17/2023 Yes   Final    POC Temperature (Urine) 08/17/2023 90   Final         Orders Placed This Encounter   Procedures    POCT Urine Drug Screen Presump     Interpretive Information:     Negative:  No drug detected at the cut off level.   Positive:  This result represents presumptive positive for the    tested drug, other substances may yield a positive response other   than the analyte of interest. This result should be utilized for   diagnostic purpose only. Confirmation testing will be performed upon physician request only.              Requested Prescriptions     Signed Prescriptions Disp Refills    baclofen (LIORESAL) 10 MG tablet 90 tablet 1     Sig: Take 1 tablet (10 mg total) by mouth 3 (three) times daily.    tiZANidine 4 mg Cap 60 capsule 1     Sig: Take 1 capsule by mouth every 12 (twelve) hours.    HYDROcodone-acetaminophen (NORCO)  mg per tablet 90 tablet 0     Sig: Take 1 tablet by mouth every 8 (eight) hours.    HYDROcodone-acetaminophen (NORCO)  mg per tablet 90 tablet 0     Sig: Take 1 tablet by mouth every 8 (eight) hours.       Assessment:     1. Multiple sclerosis    2. Cervical radiculopathy    3. Lumbosacral spondylosis without myelopathy    4. Chronic pain of right knee    5. Encounter for long-term (current) use of other medications         A's of Opioid Risk Assessment  Activity:Patient can perform ADL.   Analgesia:Patients pain is partially controlled by current medication. Patient has tried OTC medications such as Tylenol and Ibuprofen with out relief.   Adverse Effects: Patient denies constipation or sedation.  Aberrant Behavior:  reviewed with no aberrant drug seeking/taking behavior.  Overdose reversal drug naloxone discussed    Drug screen reviewed      X-[facility] May 2021 multiple level degenerative changes no fracture noted    MRI [facility] lumbar spine degenerative changes multiple levels will place report under media      Plan:      Narcan January 2023      Follows Neurology multiple sclerosis [place]     Requesting resume [place] she states her pharmacy now has supply of Norco    Continues complaint cervical spine discomfort myalgia type pain considering cervical trigger point injections    Would like to continue conservative management this  time    Continue current medication    Continue home exercise program as directed    Follow-up 2 months     Dr. Gaston, January 2024    Bring original prescription medication bottles/container/box with labels to each visit

## 2023-12-14 ENCOUNTER — OFFICE VISIT (OUTPATIENT)
Dept: PAIN MEDICINE | Facility: CLINIC | Age: 43
End: 2023-12-14
Payer: COMMERCIAL

## 2023-12-14 VITALS
HEIGHT: 63 IN | SYSTOLIC BLOOD PRESSURE: 115 MMHG | DIASTOLIC BLOOD PRESSURE: 88 MMHG | BODY MASS INDEX: 23.21 KG/M2 | RESPIRATION RATE: 18 BRPM | HEART RATE: 107 BPM | WEIGHT: 131 LBS

## 2023-12-14 DIAGNOSIS — Z79.899 ENCOUNTER FOR LONG-TERM (CURRENT) USE OF OTHER MEDICATIONS: ICD-10-CM

## 2023-12-14 DIAGNOSIS — M25.561 CHRONIC PAIN OF RIGHT KNEE: Chronic | ICD-10-CM

## 2023-12-14 DIAGNOSIS — M47.817 LUMBOSACRAL SPONDYLOSIS WITHOUT MYELOPATHY: Chronic | ICD-10-CM

## 2023-12-14 DIAGNOSIS — G89.29 CHRONIC PAIN OF RIGHT KNEE: Chronic | ICD-10-CM

## 2023-12-14 DIAGNOSIS — G35 MULTIPLE SCLEROSIS: Primary | Chronic | ICD-10-CM

## 2023-12-14 DIAGNOSIS — M54.12 CERVICAL RADICULOPATHY: Chronic | ICD-10-CM

## 2023-12-14 LAB

## 2023-12-14 PROCEDURE — 1159F MED LIST DOCD IN RCRD: CPT | Mod: CPTII,,, | Performed by: PHYSICIAN ASSISTANT

## 2023-12-14 PROCEDURE — 99214 OFFICE O/P EST MOD 30 MIN: CPT | Mod: S$PBB,,, | Performed by: PHYSICIAN ASSISTANT

## 2023-12-14 PROCEDURE — 3008F PR BODY MASS INDEX (BMI) DOCUMENTED: ICD-10-PCS | Mod: CPTII,,, | Performed by: PHYSICIAN ASSISTANT

## 2023-12-14 PROCEDURE — 3079F PR MOST RECENT DIASTOLIC BLOOD PRESSURE 80-89 MM HG: ICD-10-PCS | Mod: CPTII,,, | Performed by: PHYSICIAN ASSISTANT

## 2023-12-14 PROCEDURE — 3074F PR MOST RECENT SYSTOLIC BLOOD PRESSURE < 130 MM HG: ICD-10-PCS | Mod: CPTII,,, | Performed by: PHYSICIAN ASSISTANT

## 2023-12-14 PROCEDURE — 3079F DIAST BP 80-89 MM HG: CPT | Mod: CPTII,,, | Performed by: PHYSICIAN ASSISTANT

## 2023-12-14 PROCEDURE — 99999PBSHW POCT URINE DRUG SCREEN PRESUMP: ICD-10-PCS | Mod: PBBFAC,,,

## 2023-12-14 PROCEDURE — 99215 OFFICE O/P EST HI 40 MIN: CPT | Mod: PBBFAC | Performed by: PHYSICIAN ASSISTANT

## 2023-12-14 PROCEDURE — 80305 DRUG TEST PRSMV DIR OPT OBS: CPT | Mod: PBBFAC | Performed by: PHYSICIAN ASSISTANT

## 2023-12-14 PROCEDURE — 1159F PR MEDICATION LIST DOCUMENTED IN MEDICAL RECORD: ICD-10-PCS | Mod: CPTII,,, | Performed by: PHYSICIAN ASSISTANT

## 2023-12-14 PROCEDURE — 99214 PR OFFICE/OUTPT VISIT, EST, LEVL IV, 30-39 MIN: ICD-10-PCS | Mod: S$PBB,,, | Performed by: PHYSICIAN ASSISTANT

## 2023-12-14 PROCEDURE — 3008F BODY MASS INDEX DOCD: CPT | Mod: CPTII,,, | Performed by: PHYSICIAN ASSISTANT

## 2023-12-14 PROCEDURE — 99999PBSHW POCT URINE DRUG SCREEN PRESUMP: Mod: PBBFAC,,,

## 2023-12-14 PROCEDURE — 3074F SYST BP LT 130 MM HG: CPT | Mod: CPTII,,, | Performed by: PHYSICIAN ASSISTANT

## 2023-12-14 RX ORDER — TRAMADOL HYDROCHLORIDE 50 MG/1
50 TABLET ORAL EVERY 6 HOURS PRN
Qty: 120 TABLET | Refills: 1 | Status: CANCELLED | OUTPATIENT
Start: 2023-12-14

## 2023-12-14 RX ORDER — TIZANIDINE HYDROCHLORIDE 4 MG/1
1 CAPSULE, GELATIN COATED ORAL EVERY 12 HOURS
Qty: 60 CAPSULE | Refills: 1 | Status: SHIPPED | OUTPATIENT
Start: 2023-12-14 | End: 2024-03-20 | Stop reason: SDUPTHER

## 2023-12-14 RX ORDER — HYDROCODONE BITARTRATE AND ACETAMINOPHEN 10; 325 MG/1; MG/1
1 TABLET ORAL EVERY 8 HOURS
Qty: 90 TABLET | Refills: 0 | Status: SHIPPED | OUTPATIENT
Start: 2024-01-13 | End: 2024-03-20 | Stop reason: SDUPTHER

## 2023-12-14 RX ORDER — BACLOFEN 10 MG/1
10 TABLET ORAL 3 TIMES DAILY
Qty: 90 TABLET | Refills: 1 | Status: SHIPPED | OUTPATIENT
Start: 2023-12-14 | End: 2024-03-20 | Stop reason: SDUPTHER

## 2023-12-14 RX ORDER — HYDROCODONE BITARTRATE AND ACETAMINOPHEN 10; 325 MG/1; MG/1
1 TABLET ORAL EVERY 8 HOURS
Qty: 90 TABLET | Refills: 0 | Status: SHIPPED | OUTPATIENT
Start: 2023-12-14 | End: 2024-01-13

## 2024-01-11 DIAGNOSIS — M47.817 LUMBOSACRAL SPONDYLOSIS WITHOUT MYELOPATHY: Chronic | ICD-10-CM

## 2024-01-11 DIAGNOSIS — M54.12 CERVICAL RADICULOPATHY: Chronic | ICD-10-CM

## 2024-01-11 DIAGNOSIS — G35 MULTIPLE SCLEROSIS: Chronic | ICD-10-CM

## 2024-01-11 RX ORDER — AMITRIPTYLINE HYDROCHLORIDE 25 MG/1
25 TABLET, FILM COATED ORAL NIGHTLY
Qty: 30 TABLET | Refills: 0 | OUTPATIENT
Start: 2024-01-11

## 2024-03-12 ENCOUNTER — HOSPITAL ENCOUNTER (OUTPATIENT)
Dept: RADIOLOGY | Facility: HOSPITAL | Age: 44
Discharge: HOME | End: 2024-03-12
Payer: COMMERCIAL

## 2024-03-12 ENCOUNTER — OFFICE VISIT (OUTPATIENT)
Dept: ORTHOPEDICS | Facility: CLINIC | Age: 44
End: 2024-03-12
Payer: COMMERCIAL

## 2024-03-12 VITALS — HEIGHT: 63 IN | BODY MASS INDEX: 23.21 KG/M2 | WEIGHT: 131 LBS

## 2024-03-12 DIAGNOSIS — S89.91XA INJURY OF RIGHT KNEE, INITIAL ENCOUNTER: Primary | ICD-10-CM

## 2024-03-12 DIAGNOSIS — S89.91XA INJURY OF RIGHT KNEE, INITIAL ENCOUNTER: ICD-10-CM

## 2024-03-12 DIAGNOSIS — G89.29 CHRONIC PAIN OF RIGHT KNEE: Primary | ICD-10-CM

## 2024-03-12 DIAGNOSIS — M25.561 CHRONIC PAIN OF RIGHT KNEE: Primary | ICD-10-CM

## 2024-03-12 PROCEDURE — 73562 X-RAY EXAM OF KNEE 3: CPT | Mod: TC,RT

## 2024-03-12 PROCEDURE — 73562 X-RAY EXAM OF KNEE 3: CPT | Mod: 26,RT,, | Performed by: STUDENT IN AN ORGANIZED HEALTH CARE EDUCATION/TRAINING PROGRAM

## 2024-03-12 PROCEDURE — 63600175 PHARM REV CODE 636 W HCPCS

## 2024-03-12 PROCEDURE — 99203 OFFICE O/P NEW LOW 30 MIN: CPT | Mod: S$PBB,,,

## 2024-03-12 PROCEDURE — 99214 OFFICE O/P EST MOD 30 MIN: CPT | Mod: PBBFAC,25

## 2024-03-12 RX ORDER — NAPROXEN 500 MG/1
500 TABLET ORAL 2 TIMES DAILY WITH MEALS
Qty: 60 TABLET | Refills: 0 | Status: SHIPPED | OUTPATIENT
Start: 2024-03-12 | End: 2024-04-11

## 2024-03-12 NOTE — PROGRESS NOTES
CC:   Chief Complaint   Patient presents with    Right Knee - Injury          Pili Love is a 43 y.o. female seen today for follow up of Injury of the Right Knee  Patient is known to Dr. Palmer for previous right knee arthroscopy in .  Patient recently fell last Thursday on the front of her knee.  Patient states she was running down a hill when she stepped in a hole falling forward.  Patient states her knee twisted at the time of injury.  Patient reports swelling.  She reports difficulty putting weight on her knee.  Patient reports a shooting pain on the outside of her knee when walking.  She takes Norco for chronic back pain, no improvement in knee pain with Norco.  No other complaints today.      PAST MEDICAL HISTORY:   Past Medical History:   Diagnosis Date    Acute bronchitis 3/24/2021    Anxiety     Asthma     Chronic otitis media of both ears 2023    Depression     Endometriosis, unspecified     History of migraine 2023    History of multiple sclerosis 2023    Lumbosacral spondylosis without myelopathy 2022    Migraine     Multiple sclerosis     Dr Antonio        PAST SURGICAL HISTORY:   Past Surgical History:   Procedure Laterality Date     bilateral cervical paraspinous muscle trigger point injection Bilateral [id number]    D Shows  x 4     SECTION      FOOT SURGERY Left     left toe fusion    HYSTERECTOMY      INJECTION OF ANESTHETIC AGENT AROUND MEDIAL BRANCH NERVES INNERVATING LUMBAR FACET JOINT Bilateral 2022    Procedure: BLOCK, NERVE, FACET JOINT, LUMBAR, MEDIAL BRANCH;  Surgeon: Twyla Gaston MD;  Location: [facility];  Service: Pain Management;  Laterality: Bilateral;    INJECTION OF ANESTHETIC AGENT AROUND MEDIAL BRANCH NERVES INNERVATING LUMBAR FACET JOINT Bilateral 11/10/2022    Procedure: Block-nerve-medial branch-lumbar, bilateral L4 through S1;  Surgeon: Twyla Gaston MD;  Location: [facility];  Service: Pain  Management;  Laterality: Bilateral;  patient will have to be tested    KNEE CARTILAGE SURGERY Right     LUMBAR PUNCTURE  2016    Dr Trejo    MYRINGOTOMY WITH INSERTION OF VENTILATION TUBE Bilateral 2/17/2023    Procedure: MYRINGOTOMY, WITH TYMPANOSTOMY TUBE INSERTION;  Surgeon: Gagan Francis MD;  Location: [facility];  Service: ENT;  Laterality: Bilateral;    RADIOFREQUENCY ABLATION OF LUMBAR MEDIAL BRANCH NERVE AT SINGLE LEVEL Right 12/15/2022    Procedure: Radiofrequency Ablation, Nerve, Spinal, Lumbar, Medial Branch, Level L4-S1;  Surgeon: Twyla Gaston MD;  Location: [facility];  Service: Pain Management;  Laterality: Right;  cong left after right side is done    RADIOFREQUENCY ABLATION OF LUMBAR MEDIAL BRANCH NERVE AT SINGLE LEVEL Left 1/5/2023    Procedure: RADIOFREQUENCY ABLATION, NERVE, SPINAL, LUMBAR, MEDIAL BRANCH, 1 LEVEL;  Surgeon: Twyla Gaston MD;  Location: [facility];  Service: Pain Management;  Laterality: Left;  Left L4-S1 RFTC. Pt had right on 12/15    Right C3-C7 FI Right 04/17/2018    Dr Trejo    TUBAL LIGATION      VAGINAL DELIVERY          ALLERGIES:   Review of patient's allergies indicates:   Allergen Reactions    Cockroach     Grass pollen-bermuda, standard     Grass pollen-veronica, standard     Latex, natural rubber     Peanuts [peanut (legumes)] Other (See Comments)     Allergy testing    Penicillins     Wheat containing prod Other (See Comments)     Allergy testing        MEDICATIONS :    Current Outpatient Medications:     albuterol (VENTOLIN HFA) 90 mcg/actuation inhaler, Inhale 2 puffs into the lungs every 6 (six) hours as needed for Wheezing. Rescue, Disp: 18 g, Rfl: 0    ARIPiprazole (ABILIFY) 20 MG Tab, Take by mouth., Disp: , Rfl:     baclofen (LIORESAL) 10 MG tablet, Take 1 tablet (10 mg total) by mouth 3 (three) times daily., Disp: 90 tablet, Rfl: 1    buPROPion (WELLBUTRIN XL) 150 MG TB24 tablet, Take 150 mg by mouth every morning., Disp: , Rfl:      diclofenac sodium (VOLTAREN ARTHRITIS PAIN) 1 % Gel, Apply 2 g topically 2 (two) times daily. Apply 2 grams BID to knees, elbows, hips joints bilaterally as needed, Disp: 10 each, Rfl: 2    EPINEPHrine (EPIPEN) 0.3 mg/0.3 mL AtIn, Inject 0.3 mLs (0.3 mg total) into the muscle once. for 1 dose, Disp: 0.3 mL, Rfl: 0    EScitalopram oxalate (LEXAPRO) 20 MG tablet, Take 20 mg by mouth once daily., Disp: , Rfl:     gabapentin (NEURONTIN) 300 MG capsule, Take 2 capsules (600 mg total) by mouth 3 (three) times daily., Disp: 270 capsule, Rfl: 3    LIDOcaine (LIDODERM) 5 %, Place 1 patch onto the skin once daily. Remove & Discard patch within 12 hours or as directed by MD, Disp: 30 patch, Rfl: 2    naproxen (NAPROSYN) 500 MG tablet, Take 1 tablet (500 mg total) by mouth 2 (two) times daily with meals., Disp: 60 tablet, Rfl: 0    nortriptyline (PAMELOR) 75 MG Cap, Take 1 capsule (75 mg total) by mouth every evening., Disp: 90 capsule, Rfl: 3    terconazole (TERAZOL 7) 0.4 % Crea, Place 1 applicator vaginally every evening., Disp: 7 g, Rfl: 0    tiZANidine 4 mg Cap, Take 1 capsule by mouth every 12 (twelve) hours., Disp: 60 capsule, Rfl: 1     SOCIAL HISTORY:   Social History     Socioeconomic History    Marital status: Single   Tobacco Use    Smoking status: Never    Smokeless tobacco: Never   Substance and Sexual Activity    Alcohol use: Never    Drug use: Never    Sexual activity: Yes     Birth control/protection: See Surgical Hx     Social Determinants of Health     Physical Activity: Inactive (8/30/2023)    Exercise Vital Sign     Days of Exercise per Week: 0 days     Minutes of Exercise per Session: 0 min   Stress: No Stress Concern Present (8/30/2023)    [employer] - Occupational Stress Questionnaire     Feeling of Stress : Not at all        FAMILY HISTORY:   Family History   Problem Relation Age of Onset    Hypertension Paternal Grandfather     Heart attack Paternal Grandfather     Cancer  Maternal Grandfather     Hypertension Father     Asthma Father           PHYSICAL EXAM:      There were no vitals filed for this visit.  Body mass index is 23.21 kg/m².    GENERAL: Well-developed, well-nourished female . The patient is alert, oriented and cooperative.    EXTREMITIES:  Right knee was skin clean dry and intact, minimal soft tissue swelling, no joint effusion, tenderness to palpation along lateral joint line and inferior patella, full range of motion from 0-120 degrees but not without pain, knee is stable to varus valgus stress testing, positive Court's, negative anterior drawer and Lachman testing, neurovascularly intact      RADIOGRAPHIC FINDINGS:   X-Ray Knee AP LAT with Sunrise Right    Result Date: 3/12/2024  EXAMINATION: XR KNEE AP LAT WITH SUNRISE RIGHT CLINICAL HISTORY: Unspecified injury of right lower leg, initial encounter TECHNIQUE: XR KNEE AP LAT WITH SUNRISE RIGHT COMPARISON: 2015 FINDINGS: No acute fracture or dislocation. Mild tricompartmental degenerative change of the right knee No radiopaque foreign bodies.     No acute findings Mild tricompartmental degenerative change of the right knee Electronically signed by: Yoni Reis Date:    03/12/2024 Time:    10:40       Patient Active Problem List    Diagnosis Date Noted    Chronic pain of right knee 10/17/2023    Depressive disorder 08/30/2023    Chronic pain disorder 08/30/2023    Asthma 08/30/2023    Anxiety 08/30/2023    Trochanteric bursitis of right hip 06/19/2023    Polyarthralgia 01/30/2023    Neck pain 08/17/2021    Myalgia 08/17/2021    Multiple sclerosis 05/24/2021    Cervical radiculopathy 05/24/2021    Migraine     Lumbosacral radiculopathy     Chronic migraine without aura with status migrainosus, not intractable 06/21/2017     IMPRESSION AND PLAN:  Right knee pain after fall.  Personally reviewed x-rays today with no sign of acute fracture or dislocation, mild DJD changes.  Discussed all treatment options patient.   Discussed oral and topical NSAIDs for pain relief in addition to her Norco, we will prescribe naproxen today.  Discussed physical therapy for 6 weeks. Patient is requesting crutches as it is difficult to weight bear, given a pair of crutches today with weight-bearing as tolerated. Follow up after physical therapy if pain persists to discuss possible MRI.        No follow-ups on file.       Yaneth Nunez PA-C      (Subject to voice recognition error, transcription service not allowed)

## 2024-03-12 NOTE — PATIENT INSTRUCTIONS
Right knee pain after fall.  Personally reviewed x-rays today with no sign of acute fracture or dislocation, mild DJD changes.  Discussed all treatment options patient.  Discussed oral and topical NSAIDs for pain relief in addition to her Norco, we will prescribe naproxen today.  Discussed physical therapy for 6 weeks. Patient is requesting crutches as it is difficult to weight bear, given a pair of crutches today with weight-bearing as tolerated. Follow up after physical therapy if pain persists to discuss possible MRI.

## 2024-03-20 NOTE — PROGRESS NOTES
Subjective:         Patient ID: Pili Love is a 43 y.o. female.    Chief Complaint: Knee Pain and Back Pain        Pain  This is a chronic problem. The current episode started more than 1 year ago. The problem occurs daily. The problem has been waxing and waning. Associated symptoms include arthralgias, myalgias and neck pain. Pertinent negatives include no anorexia, chest pain, chills, coughing, diaphoresis, fever, sore throat, vertigo or vomiting.     Review of Systems   Constitutional:  Negative for activity change, appetite change, chills, diaphoresis and fever.   HENT:  Negative for drooling, ear discharge, ear pain, facial swelling, nosebleeds, sore throat, trouble swallowing, voice change and goiter.    Eyes:  Negative for photophobia, pain, discharge, redness and visual disturbance.   Respiratory:  Negative for apnea, cough, choking, chest tightness, shortness of breath, wheezing and stridor.    Cardiovascular:  Negative for chest pain, palpitations and leg swelling.   Gastrointestinal:  Negative for abdominal distention, anorexia, diarrhea, rectal pain, vomiting and fecal incontinence.   Endocrine: Negative for cold intolerance, heat intolerance, polydipsia, polyphagia and polyuria.   Genitourinary:  Negative for flank pain, frequency and hot flashes.   Musculoskeletal:  Positive for arthralgias, back pain, myalgias and neck pain.   Integumentary:  Negative for color change and pallor.   Allergic/Immunologic: Negative for immunocompromised state.   Neurological:  Negative for dizziness, vertigo, seizures, syncope, facial asymmetry, speech difficulty, light-headedness, memory loss and coordination difficulties.   Hematological:  Negative for adenopathy. Does not bruise/bleed easily.   Psychiatric/Behavioral:  Negative for agitation, behavioral problems, confusion, decreased concentration, dysphoric mood, hallucinations, self-injury and suicidal ideas. The patient is not nervous/anxious and is not  hyperactive.            Past Medical History:   Diagnosis Date    Acute bronchitis 3/24/2021    Anxiety     Asthma     Chronic otitis media of both ears 2023    Depression     Endometriosis, unspecified     History of migraine 2023    History of multiple sclerosis 2023    Lumbosacral spondylosis without myelopathy 2022    Migraine     Multiple sclerosis     Dr Antonio     Past Surgical History:   Procedure Laterality Date     bilateral cervical paraspinous muscle trigger point injection Bilateral [id number]    D Shows  x 4     SECTION      FOOT SURGERY Left     left toe fusion    HYSTERECTOMY      INJECTION OF ANESTHETIC AGENT AROUND MEDIAL BRANCH NERVES INNERVATING LUMBAR FACET JOINT Bilateral 2022    Procedure: BLOCK, NERVE, FACET JOINT, LUMBAR, MEDIAL BRANCH;  Surgeon: Twyla Gaston MD;  Location: [facility];  Service: Pain Management;  Laterality: Bilateral;    INJECTION OF ANESTHETIC AGENT AROUND MEDIAL BRANCH NERVES INNERVATING LUMBAR FACET JOINT Bilateral 11/10/2022    Procedure: Block-nerve-medial branch-lumbar, bilateral L4 through S1;  Surgeon: Twyla Gaston MD;  Location: [facility];  Service: Pain Management;  Laterality: Bilateral;  patient will have to be tested    KNEE CARTILAGE SURGERY Right     LUMBAR PUNCTURE      Dr Trejo    MYRINGOTOMY WITH INSERTION OF VENTILATION TUBE Bilateral 2023    Procedure: MYRINGOTOMY, WITH TYMPANOSTOMY TUBE INSERTION;  Surgeon: Gagan Francis MD;  Location: [facility];  Service: ENT;  Laterality: Bilateral;    RADIOFREQUENCY ABLATION OF LUMBAR MEDIAL BRANCH NERVE AT SINGLE LEVEL Right 12/15/2022    Procedure: Radiofrequency Ablation, Nerve, Spinal, Lumbar, Medial Branch, Level L4-S1;  Surgeon: Twyla Gaston MD;  Location: [facility];  Service: Pain Management;  Laterality: Right;  cong left after right side is done    RADIOFREQUENCY ABLATION OF LUMBAR MEDIAL BRANCH NERVE AT SINGLE LEVEL Left  "1/5/2023    Procedure: RADIOFREQUENCY ABLATION, NERVE, SPINAL, LUMBAR, MEDIAL BRANCH, 1 LEVEL;  Surgeon: Twyla Gaston MD;  Location: [facility];  Service: Pain Management;  Laterality: Left;  Left L4-S1 RFTC. Pt had right on 12/15    Right C3-C7 FI Right 04/17/2018    Dr Trejo    TUBAL LIGATION      VAGINAL DELIVERY       Social History     Socioeconomic History    Marital status: Single   Tobacco Use    Smoking status: Never    Smokeless tobacco: Never   Substance and Sexual Activity    Alcohol use: Never    Drug use: Never    Sexual activity: Yes     Birth control/protection: See Surgical Hx     Social Determinants of Health     Physical Activity: Inactive (8/30/2023)    Exercise Vital Sign     Days of Exercise per Week: 0 days     Minutes of Exercise per Session: 0 min   Stress: No Stress Concern Present (8/30/2023)    [employer] - Occupational Stress Questionnaire     Feeling of Stress : Not at all     Family History   Problem Relation Age of Onset    Hypertension Paternal Grandfather     Heart attack Paternal Grandfather     Cancer Maternal Grandfather     Hypertension Father     Asthma Father      Review of patient's allergies indicates:   Allergen Reactions    Cockroach     Grass pollen-bermuda, standard     Grass pollen-veronica, standard     Latex, natural rubber     Peanuts [peanut (legumes)] Other (See Comments)     Allergy testing    Penicillins     Wheat containing prod Other (See Comments)     Allergy testing        Objective:  Vitals:    03/21/24 1027   BP: 120/84   Pulse: 110   Resp: 18   SpO2: 98%   Weight: 63.5 kg (140 lb)   Height: 5' 3" (1.6 m)   PainSc:   8         Physical Exam  Vitals and nursing note reviewed. Exam conducted with a chaperone present.   Constitutional:       General: She is awake. She is not in acute distress.     Appearance: Normal appearance. She is not ill-appearing or diaphoretic.   HENT:      Head: Normocephalic and atraumatic.      " Nose: Nose normal.      Mouth/Throat:      Mouth: Mucous membranes are moist.      Pharynx: Oropharynx is clear.   Eyes:      Conjunctiva/sclera: Conjunctivae normal.      Pupils: Pupils are equal, round, and reactive to light.   Cardiovascular:      Rate and Rhythm: Normal rate.   Pulmonary:      Effort: Pulmonary effort is normal. No respiratory distress.   Abdominal:      Palpations: Abdomen is soft.      Tenderness: There is no guarding.   Musculoskeletal:         General: Normal range of motion.      Cervical back: Normal range of motion and neck supple. Tenderness present. No rigidity.      Thoracic back: Tenderness present.      Lumbar back: Tenderness present.   Skin:     General: Skin is warm and dry.      Coloration: Skin is not jaundiced or pale.   Neurological:      General: No focal deficit present.      Mental Status: She is alert and oriented to person, place, and time. Mental status is at baseline.      Cranial Nerves: No cranial nerve deficit (II-XII).   Psychiatric:         Mood and Affect: Mood normal.         Behavior: Behavior normal. Behavior is cooperative.         Thought Content: Thought content normal.           X-Ray Knee AP LAT with [place] Right  Narrative: EXAMINATION:  XR KNEE AP LAT WITH SUNRISE RIGHT    CLINICAL HISTORY:  Unspecified injury of right lower leg, initial encounter    TECHNIQUE:  XR KNEE AP LAT WITH SUNRISE RIGHT    COMPARISON:  2015    FINDINGS:  No acute fracture or dislocation.    Mild tricompartmental degenerative change of the right knee    No radiopaque foreign bodies.  Impression: No acute findings    Mild tricompartmental degenerative change of the right knee    Electronically signed by: Yoni Reis  Date:    03/12/2024  Time:    10:40         Office Visit on 12/14/2023   Component Date Value Ref Range Status    POC Amphetamines 12/14/2023 Negative  Negative, Inconclusive Final    POC Barbiturates 12/14/2023 Negative  Negative, Inconclusive Final    POC  Benzodiazepines 12/14/2023 Negative  Negative, Inconclusive Final    POC Cocaine 12/14/2023 Negative  Negative, Inconclusive Final    POC THC 12/14/2023 Negative  Negative, Inconclusive Final    POC Methadone 12/14/2023 Negative  Negative, Inconclusive Final    POC Methamphetamine 12/14/2023 Negative  Negative, Inconclusive Final    POC Opiates 12/14/2023 Negative  Negative, Inconclusive Final    POC Oxycodone 12/14/2023 Negative  Negative, Inconclusive Final    POC Phencyclidine 12/14/2023 Negative  Negative, Inconclusive Final    POC Methylenedioxymethamphetamine * 12/14/2023 Negative  Negative, Inconclusive Final    POC Tricyclic Antidepressants 12/14/2023 Negative  Negative, Inconclusive Final    POC Buprenorphine 12/14/2023 Negative   Final     Acceptable 12/14/2023 Yes   Final    POC Temperature (Urine) 12/14/2023 92   Final         Orders Placed This Encounter   Procedures    Large Joint Aspiration/Injection: R knee     This order was created via procedure documentation    POCT Urine Drug Screen Presump     Interpretive Information:     Negative:  No drug detected at the cut off level.   Positive:  This result represents presumptive positive for the   tested drug, other substances may yield a positive response other   than the analyte of interest. This result should be utilized for   diagnostic purpose only. Confirmation testing will be performed upon physician request only.              Requested Prescriptions     Signed Prescriptions Disp Refills    baclofen (LIORESAL) 10 MG tablet 90 tablet 1     Sig: Take 1 tablet (10 mg total) by mouth 3 (three) times daily.    tiZANidine 4 mg Cap 60 capsule 1     Sig: Take 1 capsule by mouth every 12 (twelve) hours.    HYDROcodone-acetaminophen (NORCO)  mg per tablet 90 tablet 0     Sig: Take 1 tablet by mouth every 8 (eight) hours.    HYDROcodone-acetaminophen (NORCO)  mg per tablet 90 tablet 0     Sig: Take 1 tablet by mouth every 8 (eight)  hours.       Assessment:     1. Chronic pain of right knee    2. Cervical radiculopathy    3. Multiple sclerosis    4. Lumbosacral spondylosis without myelopathy    5. Encounter for long-term (current) use of other medications         A's of Opioid Risk Assessment  Activity:Patient can perform ADL.   Analgesia:Patients pain is partially controlled by current medication. Patient has tried OTC medications such as Tylenol and Ibuprofen with out relief.   Adverse Effects: Patient denies constipation or sedation.  Aberrant Behavior:  reviewed with no aberrant drug seeking/taking behavior.  Overdose reversal drug naloxone discussed    Drug screen reviewed        X-ray lumbar spine [facility] May 2021 multiple level degenerative changes no fracture noted    MRI [facility] lumbar spine degenerative changes multiple levels will place report under media        Plan:      Narcan January 2023      Follows Neurology multiple sclerosis [place]     Follows orthopedics [facility] office note March 12, 2024 reviewed    Continues complaint cervical spine discomfort myalgia type pain     Complaint right knee pain ongoing for more than 6 months, recently aggravated after a fall at home    Last injection right knee October 17, 2023    Was seen by Orthopedics no fractures noted, follow-up as needed     X-ray right knee [facility] March 12, 2024, mild tricompartmental degenerative changes right knee, no fracture noted    Consider MRI right knee if indicated     She would like to proceed with right knee injection today    Continue current medication    Continue home exercise program as directed    Follow-up 2 months     Dr. Gaston, January 2024    Bring original prescription medication bottles/container/box with labels to each visit

## 2024-03-21 ENCOUNTER — OFFICE VISIT (OUTPATIENT)
Dept: PAIN MEDICINE | Facility: CLINIC | Age: 44
End: 2024-03-21
Payer: COMMERCIAL

## 2024-03-21 VITALS
BODY MASS INDEX: 24.8 KG/M2 | SYSTOLIC BLOOD PRESSURE: 120 MMHG | WEIGHT: 140 LBS | HEART RATE: 110 BPM | DIASTOLIC BLOOD PRESSURE: 84 MMHG | OXYGEN SATURATION: 98 % | RESPIRATION RATE: 18 BRPM | HEIGHT: 63 IN

## 2024-03-21 DIAGNOSIS — Z79.899 ENCOUNTER FOR LONG-TERM (CURRENT) USE OF OTHER MEDICATIONS: ICD-10-CM

## 2024-03-21 DIAGNOSIS — M25.561 CHRONIC PAIN OF RIGHT KNEE: Primary | ICD-10-CM

## 2024-03-21 DIAGNOSIS — M47.817 LUMBOSACRAL SPONDYLOSIS WITHOUT MYELOPATHY: Chronic | ICD-10-CM

## 2024-03-21 DIAGNOSIS — G89.29 CHRONIC PAIN OF RIGHT KNEE: Primary | ICD-10-CM

## 2024-03-21 DIAGNOSIS — M54.12 CERVICAL RADICULOPATHY: Chronic | ICD-10-CM

## 2024-03-21 DIAGNOSIS — G35 MULTIPLE SCLEROSIS: Chronic | ICD-10-CM

## 2024-03-21 PROCEDURE — 3008F BODY MASS INDEX DOCD: CPT | Mod: CPTII,,, | Performed by: PHYSICIAN ASSISTANT

## 2024-03-21 PROCEDURE — 99214 OFFICE O/P EST MOD 30 MIN: CPT | Mod: 25,S$PBB,, | Performed by: PHYSICIAN ASSISTANT

## 2024-03-21 PROCEDURE — 20610 DRAIN/INJ JOINT/BURSA W/O US: CPT | Mod: PBBFAC | Performed by: PHYSICIAN ASSISTANT

## 2024-03-21 PROCEDURE — 99999PBSHW POCT URINE DRUG SCREEN PRESUMP: Mod: PBBFAC,,,

## 2024-03-21 PROCEDURE — 1159F MED LIST DOCD IN RCRD: CPT | Mod: CPTII,,, | Performed by: PHYSICIAN ASSISTANT

## 2024-03-21 PROCEDURE — 3074F SYST BP LT 130 MM HG: CPT | Mod: CPTII,,, | Performed by: PHYSICIAN ASSISTANT

## 2024-03-21 PROCEDURE — 99215 OFFICE O/P EST HI 40 MIN: CPT | Mod: PBBFAC,25 | Performed by: PHYSICIAN ASSISTANT

## 2024-03-21 PROCEDURE — 3079F DIAST BP 80-89 MM HG: CPT | Mod: CPTII,,, | Performed by: PHYSICIAN ASSISTANT

## 2024-03-21 PROCEDURE — 80305 DRUG TEST PRSMV DIR OPT OBS: CPT | Mod: PBBFAC | Performed by: PHYSICIAN ASSISTANT

## 2024-03-21 PROCEDURE — 99999PBSHW PR PBB SHADOW TECHNICAL ONLY FILED TO HB: Mod: PBBFAC,,,

## 2024-03-21 RX ORDER — TIZANIDINE HYDROCHLORIDE 4 MG/1
1 CAPSULE, GELATIN COATED ORAL EVERY 12 HOURS
Qty: 60 CAPSULE | Refills: 1 | Status: SHIPPED | OUTPATIENT
Start: 2024-03-21 | End: 2024-05-14 | Stop reason: SDUPTHER

## 2024-03-21 RX ORDER — HYDROCODONE BITARTRATE AND ACETAMINOPHEN 10; 325 MG/1; MG/1
1 TABLET ORAL EVERY 8 HOURS
Qty: 90 TABLET | Refills: 0 | Status: SHIPPED | OUTPATIENT
Start: 2024-04-04 | End: 2024-05-14 | Stop reason: SDUPTHER

## 2024-03-21 RX ORDER — BACLOFEN 10 MG/1
10 TABLET ORAL 3 TIMES DAILY
Qty: 90 TABLET | Refills: 1 | Status: SHIPPED | OUTPATIENT
Start: 2024-03-21 | End: 2024-05-14 | Stop reason: SDUPTHER

## 2024-03-21 RX ORDER — TRIAMCINOLONE ACETONIDE 40 MG/ML
40 INJECTION, SUSPENSION INTRA-ARTICULAR; INTRAMUSCULAR
Status: DISCONTINUED | OUTPATIENT
Start: 2024-03-21 | End: 2024-03-21 | Stop reason: HOSPADM

## 2024-03-21 RX ORDER — HYDROCODONE BITARTRATE AND ACETAMINOPHEN 10; 325 MG/1; MG/1
1 TABLET ORAL EVERY 8 HOURS
Qty: 90 TABLET | Refills: 0 | Status: SHIPPED | OUTPATIENT
Start: 2024-05-04 | End: 2024-05-14 | Stop reason: SDUPTHER

## 2024-03-21 RX ORDER — BUPIVACAINE HYDROCHLORIDE 2.5 MG/ML
2 INJECTION, SOLUTION EPIDURAL; INFILTRATION; INTRACAUDAL
Status: DISCONTINUED | OUTPATIENT
Start: 2024-03-21 | End: 2024-03-21 | Stop reason: HOSPADM

## 2024-03-21 RX ADMIN — BUPIVACAINE HYDROCHLORIDE 2 ML: 2.5 INJECTION, SOLUTION EPIDURAL; INFILTRATION; INTRACAUDAL at 10:03

## 2024-03-21 RX ADMIN — TRIAMCINOLONE ACETONIDE 40 MG: 40 INJECTION, SUSPENSION INTRA-ARTICULAR; INTRAMUSCULAR at 10:03

## 2024-03-21 NOTE — PROCEDURES
Large Joint Aspiration/Injection: R knee    Date/Time: 3/21/2024 10:15 AM    Performed by: Wyatt Franklin PA  Authorized by: Wyatt Franklin PA    Consent Done?:  Yes (Written)  Indications:  Arthritis and pain  Site marked: the procedure site was marked    Timeout: prior to procedure the correct patient, procedure, and site was verified    Prep: patient was prepped and draped in usual sterile fashion      Details:  Needle Size:  22 G  Approach:  Lateral  Location:  Knee  Site:  R knee  Medications:  2 mL BUPivacaine (PF) 0.25% (2.5 mg/ml) 0.25 % (2.5 mg/mL); 40 mg triamcinolone acetonide 40 mg/mL  Aspirate amount (mL):  0  Patient tolerance:  Patient tolerated the procedure well with no immediate complications     Bupivacaine 0.25% 25 mg/ 10 ml , 2 cc     Tolerated procedure well    No complication noted    Band-Aid was applied

## 2024-03-25 ENCOUNTER — CLINICAL SUPPORT (OUTPATIENT)
Dept: REHABILITATION | Facility: HOSPITAL | Age: 44
End: 2024-03-25
Payer: COMMERCIAL

## 2024-03-25 DIAGNOSIS — G89.29 CHRONIC PAIN OF RIGHT KNEE: ICD-10-CM

## 2024-03-25 DIAGNOSIS — M25.561 ACUTE PAIN OF RIGHT KNEE: Primary | ICD-10-CM

## 2024-03-25 DIAGNOSIS — M25.561 CHRONIC PAIN OF RIGHT KNEE: ICD-10-CM

## 2024-03-25 DIAGNOSIS — R29.898 WEAKNESS OF RIGHT LEG: ICD-10-CM

## 2024-03-25 DIAGNOSIS — M25.661 DECREASED RANGE OF MOTION (ROM) OF RIGHT KNEE: ICD-10-CM

## 2024-03-25 PROCEDURE — 97162 PT EVAL MOD COMPLEX 30 MIN: CPT

## 2024-03-25 NOTE — PLAN OF CARE
[facility]   Physical Therapy Initial Evaluation      Name: Roxy Love  Clinic Number: [id number]    Therapy Diagnosis: Right Knee Pain   Physician: Yaneth Nunez PA    Physician Orders: PT Eval and Treat   Medical Diagnosis from Referral: Right Knee Pain   Evaluation Date: 3/25/2024  Authorization Period Expiration: [place] Managed   Plan of Care Expiration: 5/24/2024   Progress Note Due: As Needed   Visit # / Visits authorized: 1/ [place] Managed    FOTO: 22/100    Precautions: MS     Time In: 5:35 pm   Time Out: 6:15 pm   Total Appointment Time (timed & untimed codes): 40 minutes    Subjective     Date of onset: 3/14/2024    History of current condition - ROXY reports: She was running down a hill and stepped in a gopher hole. She fell and heard a tear/pop in the Right Knee. She had immediate pain in the knee. She did see [facility] Pozo [facility] on 3/12/2024. She ordered Outpatient Physical Therapy and patient is here today for the Evaluation. Patient reports her Knee pain ranges from 8/10 to 10/10. Knee is swollen and she has difficulty putting any weight on the Right leg at this time. She does have crutches but states her arms are hurting from using the crutches so she is here today with no assistive device. Patient will require an MRI as soon as possible but insurance is not allowing MRI at this time.       Patient was seen in the Ortho MD office on 3/12/2024. MD note states in part :   Roxy Love is a 43 y.o. female seen today for follow up of Injury of the Right Knee  Patient is known to Dr. Palmer for previous right knee arthroscopy in 1995.  Patient recently fell last Thursday on the front of her knee.  Patient states she was running down a hill when she stepped in a hole falling forward.  Patient states her knee twisted at the time of injury.  Patient reports swelling.  She reports difficulty putting weight on her knee.  Patient reports a  "shooting pain on the outside of her knee when walking.  She takes Norco for chronic back pain, no improvement in knee pain with Norco.  Right knee pain after fall. Personally reviewed x-rays today with no sign of acute fracture or dislocation, mild DJD changes. Discussed all treatment options patient. Discussed oral and topical NSAIDs for pain relief in addition to her Norco, we will prescribe naproxen today. Discussed physical therapy for 6 weeks. Patient is requesting crutches as it is difficult to weight bear, given a pair of crutches today with weight-bearing as tolerated. Follow up after physical therapy if pain persists to discuss possible MRI.    Falls: 3/14/2024    Imaging: X Ray : Knee  RADIOGRAPHIC FINDINGS:   X-Ray Knee AP LAT with [place] Right     Result Date: 3/12/2024  EXAMINATION: XR KNEE AP LAT WITH [place] RIGHT CLINICAL HISTORY: Unspecified injury of right lower leg, initial encounter TECHNIQUE: XR KNEE AP LAT WITH [place] RIGHT COMPARISON: 2015 FINDINGS: No acute fracture or dislocation. Mild tricompartmental degenerative change of the right knee No radiopaque foreign bodies.      No acute findings Mild tricompartmental degenerative change of the right knee     Prior Therapy: None   Social History:  lives with their family  Occupation: Does not work outside the home; has 2 children she cares for   Prior Level of Function: Independent and active   Current Level of Function: Hurts to put weight on the Right Knee    Pain:  Current 8/10, worst 10/10, best 8/10   Location: right knee    Description: Aching, Burning, Sharp, and Shooting  Aggravating Factors: moving the knee and weight bearing activities  Easing Factors: pain medication, ice, heating pad, and rest    Patients goals: "I want to figure out what is wrong with the knee."     Medical History:   Past Medical History:   Diagnosis Date    Acute bronchitis 3/24/2021    Anxiety     Asthma     Chronic otitis media of both ears 2/17/2023    " Depression     Endometriosis, unspecified     History of migraine 2023    History of multiple sclerosis 2023    Lumbosacral spondylosis without myelopathy 2022    Migraine     Multiple sclerosis     Dr Antonio       Surgical History:   Pili Love  has a past surgical history that includes Hysterectomy;  section; Knee cartilage surgery (Right); Right C3-C7 FI (Right, 2018);  bilateral cervical paraspinous muscle trigger point injection (Bilateral, 3937-7272); Lumbar puncture (2016); Tubal ligation; Vaginal delivery; Injection of anesthetic agent around medial branch nerves innervating lumbar facet joint (Bilateral, 2022); Injection of anesthetic agent around medial branch nerves innervating lumbar facet joint (Bilateral, 11/10/2022); Foot surgery (Left); Radiofrequency ablation of lumbar medial branch nerve at single level (Right, 12/15/2022); Radiofrequency ablation of lumbar medial branch nerve at single level (Left, 2023); and Myringotomy with insertion of ventilation tube (Bilateral, 2023).    Medications:   Pili has a current medication list which includes the following prescription(s): albuterol, aripiprazole, baclofen, bupropion, diclofenac sodium, epinephrine, escitalopram oxalate, gabapentin, [START ON 2024] hydrocodone-acetaminophen, [START ON 2024] hydrocodone-acetaminophen, lidocaine, naproxen, nortriptyline, terconazole, and tizanidine.    Allergies:   Review of patient's allergies indicates:   Allergen Reactions    Cockroach     Grass pollen-bermuda, standard     Grass pollen-veronica, standard     Latex, natural rubber     Peanuts [peanut (legumes)] Other (See Comments)     Allergy testing    Penicillins     Wheat containing prod Other (See Comments)     Allergy testing        Objective          Observation : Patient here today with Ace, hinged knee braced with patella support.     Pronation/Supination : Neutral     Comments : Swelling above the  patella at insertion of the quads; swelling along the medial joint line         Range of Motion/Strength :                  Left Extremity                                                                        Right Extremity   AROM PROM Strength  Location  AROM    PROM   Strength    Full    5/5   Hip       Full    4/5 (due to knee pain)    140    5/5   Knee    Flexion (140)  80   85  2/5     0                  Extension (0)  -40  -20     Full    5/5   Ankle     Full    5/5              Knee Special Tests : Unable to fully perform special tests due to severe pain and difficulty moving the Right Knee in to the correct positions but she was painful with Anterior Drawer, Varus Test, and Thessalys    B. Knee  Lochman's test: right Painful left Negative  Anterior drawer: right Painful left Negative  Posterior drawer: right Negative left Negative  Varus stress test: right Painful left Negative  Valgus stress test: right Negative left Negative  PFJ grind test: right Negative left Negative  Thessalys: right Positive left Negative    Functional Impairments :  Patient has difficulty with sit to stand. During standing she keeps her weight fully on the Left Lower Extremity. During ambulation, she keeps the Right Knee slightly flexed and walks on the toe of that foot. She does not flex or extend the Right Knee at all, rather the knee stays locked in approximately 20 degrees of flexion and she circumducts the leg during the swing phase to clear the toe. Ambulation is very painful and she limits walking as much as possible due to severe pain.    Limitation/Restriction for FOTO Intake Knee Survey    Therapist reviewed FOTO scores for Pili Love on 3/25/2024.   FOTO documents entered into [employer] - see Media section.    Limitation Score: 78%         Patient Education      Education provided:   - Discussed the findings from the Evaluation and Reviewed the Plan of Care.    Assessment     Pili is a 43 y.o. female referred to  outpatient Physical Therapy with a medical diagnosis of Right Knee Pain. ROXY reports: She was running down a hill and stepped in a gopher hole. She fell and heard a tear/pop in the Right Knee. She had immediate pain in the knee. She did see ALEXY Pozo at the [facility] on 3/12/2024. She ordered Outpatient Physical Therapy and patient is here today for the Evaluation. Patient reports her Knee pain ranges from 8/10 to 10/10. Knee is swollen and she has difficulty putting any weight on the Right leg at this time. She does have crutches but states her arms are hurting from using the crutches so she is here today with no assistive device. Patient will require an MRI as soon as possible but insurance is not allowing MRI at this time.     Patient presents with significantly decreased Right Knee Range of Motion and Strength. Patient has increased edema in the knee.  Swelling noted above the patella at insertion of the quads and along the medial joint line. Therapist was unable to fully perform special tests due to severe pain and difficulty moving the Right Knee in to the correct positions but she was painful with Anterior Drawer, Varus Test, and Thessalys. Patient definitely has some soft tissue damage inside the knee. Patient has difficulty with sit to stand. During standing she keeps her weight fully on the Left Lower Extremity. During ambulation, she keeps the Right Knee slightly flexed and walks on the toe of that foot. She does not flex or extend the Right Knee at all, rather the knee stays locked in approximately 20 degrees of flexion and she circumducts the leg during the swing phase to clear the toe. Ambulation is very painful and she limits walking as much as possible due to severe pain.  Patient will require Physical Therapy Intervention to address all deficits. Therapist will refer patient back to MD for further assessment and possible MRI if pain and dysfunction persist. Feel patient needs MRI as  soon as possible.       Patient prognosis is Guarded.   Patient will benefit from skilled outpatient Physical Therapy to address the deficits stated above and in the chart below, provide patient /family education, and to maximize patientt's level of independence.     Plan of care discussed with patient: Yes  Patient's spiritual, cultural and educational needs considered and patient is agreeable to the plan of care and goals as stated below:     Anticipated Barriers for therapy: Patient needs MRI as soon as possible due to likely soft tissue damage    Medical Necessity is demonstrated by the following  History  Co-morbidities and personal factors that may impact the plan of care [] LOW: no personal factors / co-morbidities  [] MODERATE: 1-2 personal factors / co-morbidities  [x] HIGH: 3+ personal factors / co-morbidities    Moderate / High Support Documentation:   Co-morbidities affecting plan of care:   Acute bronchitis   Anxiety   Asthma   Chronic otitis media of both ears   Depression   Endometriosis, unspecified   History of migraine   History of multiple sclerosis   Lumbosacral spondylosis without myelopathy   Migraine   Multiple sclerosis       Personal Factors:   no deficits     Examination  Body Structures and Functions, activity limitations and participation restrictions that may impact the plan of care [] LOW: addressing 1-2 elements  [x] MODERATE: 3+ elements  [] HIGH: 4+ elements (please support below)    Moderate / High Support Documentation: Decreased Range of Motion Right Knee, Decreased strength Right Knee, Pain with movement and with weight bearing on the Right Knee, difficulty with standing and ambulation.     Clinical Presentation [] LOW: stable  [x] MODERATE: Evolving - Will likely require additional testing at the completion of Physical Therapy to determine the exact cause of patient's pain and dysfunction    [] HIGH: Unstable     Decision Making/ Complexity Score: moderate       Goals:  Short  Term Goals: 4 weeks   1. Independent with Home Exercise Program   2. Increase Right Knee Range of Motion to 0 Degrees to 110 Degrees  3. Increase Right Knee Strength to grossly 3+/5  4. Patient will ambulate 100 feet with stance time more equal from Right to Left with complaints of pain Less than or Equal to 6/10.      Long Term Goals: 8 weeks   1. Patient will increase Right Knee Strength to grossly 4/5  2. Patient will ambulate 1000+ feet with complaints of pain Less than or Equal to 3/10.   3. Refer patient back to MD for MRI as soon as possible       Plan     Plan of care Certification: 3/25/2024 to 5/24/2024.    Outpatient Physical Therapy 2 times weekly for 8 weeks to include the following interventions: Electrical Stimulation to increase strength and decrease pain and inflammation as able, Manual Therapy, Moist Heat/ Ice, Neuromuscular Re-ed, Patient Education, Therapeutic Activities, and Therapeutic Exercise.     CACHORRO MOORE, PT, DPT       Clinical Information for Insurance Authorization     Dates of Services: 3/25/2024 to 5/24/2024.  Discharge Plan: Patient will be discharged from skilled physical therapy treatment once all goals have been met, patient has plateaued, or physician/insurance requests discontinuation of care. Patient will be discharged with a home exercise program.   Type of therapy: Rehabilitative  ICD-10 Diagnosis Code(s): M25.561  Which side is symptomatic? Right  Surgical: No  Surgical procedure: N/A  Surgery date: N/A.  Presenting symptoms/diagnoses are traumatic in nature.  Presenting symptoms are non-radiating in nature.   The rehabilitation is not related to a diagnosis of cancer.  The rehabilitation is not related to a diagnosis of lymphedema.  Patient's clinical presentation is:  Severe objective and functional deficits: consistent intense symptoms with severe loss of range of motion, strength, or ability to perform daily tasks  CPT Codes Requested:  97478 [therapeutic exercise],  84702 [neuromuscular re-education], 55267 [manual therapy], 82412 [therapeutic activities], 38528 [unattended electrical stimulation], and 65512 [vasopneumatic device]

## 2024-03-25 NOTE — PROGRESS NOTES
See Plan of Care     Sup Visit performed today with ALIS Stratton and ALIS Her.  All goals and treatment plan reviewed. Will work toward completion of all goals set.     Patient needs an MRI. She may be unable to tolerate some of the exercises. We will address pain and inflammation at end of each session.     Bike   SB  Hamstring Stretch on Stairs   Knee Flexion Stretch on Stairs     Supine :   Quad Sets  Heel Slides   Short Arc Quads   Straight Leg Raises   Hip Abduction   Bridging     Sitting :  Marching   LAQs  Hamstring Curls   Hip Abd/Add  Ankle Pumps     Standing :  Standing Marches  Squat to chair/Shallow standing knee bends  Hip Abduction   Heel Raises

## 2024-04-01 ENCOUNTER — CLINICAL SUPPORT (OUTPATIENT)
Dept: REHABILITATION | Facility: HOSPITAL | Age: 44
End: 2024-04-01
Payer: COMMERCIAL

## 2024-04-01 DIAGNOSIS — M25.661 DECREASED RANGE OF MOTION (ROM) OF RIGHT KNEE: ICD-10-CM

## 2024-04-01 DIAGNOSIS — R29.898 WEAKNESS OF RIGHT LEG: ICD-10-CM

## 2024-04-01 DIAGNOSIS — M25.561 ACUTE PAIN OF RIGHT KNEE: Primary | ICD-10-CM

## 2024-04-01 PROCEDURE — 97014 ELECTRIC STIMULATION THERAPY: CPT | Mod: CQ

## 2024-04-01 PROCEDURE — 97530 THERAPEUTIC ACTIVITIES: CPT | Mod: CQ

## 2024-04-01 PROCEDURE — 97110 THERAPEUTIC EXERCISES: CPT | Mod: CQ

## 2024-04-01 NOTE — PROGRESS NOTES
[facility]   Physical Therapy Treatment Note      Name: Pili Love  Clinic Number: [id number]  Visit Date: 4/1/2024    Therapy Diagnosis: Right Knee Pain   Physician: Yaneth Nunez PA     Physician Orders: PT Eval and Treat   Medical Diagnosis from Referral: Right Knee Pain   Evaluation Date: 3/25/2024  Authorization Period Expiration: [place] Managed   Plan of Care Expiration: 5/24/2024   Progress Note Due: As Needed   Visit # / Visits authorized: 2 / [place] Managed    PTA Visit #: 1/5   FOTO: 22/100     Precautions: MS     Time In: 2:33 pm   Time Out: 3:21 pm   Total Appointment Time (timed & untimed codes): 48 minutes    Subjective     Patient reports: the right knee is really hurting her today.  She  has not been given home exercise program yet. Will be given in future visits  Response to previous treatment: first visit since evaluation.   Functional change: n/a    Pain: 8/10  Location: right knee      Objective      Objective Measures updated at progress report unless specified.     Treatment     PILI received the treatments listed below:      therapeutic exercises to develop strength, endurance, ROM, and flexibility for 24 minutes including:   Bike x 5 minutes   SB stretch 3 x 20 second hold   Hamstring Stretch on Stairs 3 x 20 second hold (RLE)  Knee Flexion Stretch on Stairs 3 x 20 second hold (RLE)    Supine :   Quad Sets x 20  Heel Slides x 20  Short Arc Quads   Straight Leg Raises x 10 (with quad set)  Hip Abduction   Bridging     Sitting :  Marching   LAQs  Hamstring Curls   Hip Abd/Add  Ankle Pumps     manual therapy techniques: - were applied to the: - for - minutes, including:      neuromuscular re-education activities to improve: Balance, Coordination, Kinesthetic, Sense, and Proprioception for - minutes. The following activities were included:      therapeutic activities to improve functional performance for 14 minutes, including:    Standing Marches    Squat to chair/Shallow standing knee bends x 10   Hip Abduction at rail x 10 (B)  Heel Raises 2 x 10    Patient received the following supervised modalities after being cleared for contradictions: Pre-Mod Electrical Stimulation:  Patient received Pre-Mod Electrical Stimulation for pain control applied to the right knee. Pt received stimulation at a frequency of  Hz for 10 minutes. Patient tolerated treatment well without any adverse effects.      cold pack in conjunction with premod esitm for 10 minutes to right knee.    Patient Education and Home Exercises       Education provided:   - home exercise program handout given of the basic knee home exercise program 4/1/2024.     Written Home Exercises Provided: yes. Exercises were reviewed and PILI was able to demonstrate them prior to the end of the session.  PILI demonstrated good  understanding of the education provided. See Electronic Medical Record under Patient Instructions for exercises provided during therapy sessions    Assessment     Supervisory visit with CACHORRO MOORE, PT, DPT  prior to initial PTA visit.     Patient arrived today for initial visit after evaluation with reports of right knee pain. Therapist initiated Plan of Care today with focus on establishing the basic knee home exercise program. Patient was given handout of this and was able to return demonstration of each exercise correctly. Did inform Patient to try these out before she returns and we will address any problems she has with these exercises. Ended session with Premod Estim and CP to right knee to decrease pain and inflammation. Patient reported feeling better at conclusion of session today. Will continue to progress as able.       PMH from evaluation:  Pili is a 43 y.o. female referred to outpatient Physical Therapy with a medical diagnosis of Right Knee Pain. PILI reports: She was running down a hill and stepped in a gopher hole. She fell and heard a tear/pop in  the Right Knee. She had immediate pain in the knee. She did see ALEXY Pozo at the [facility] on 3/12/2024. She ordered Outpatient Physical Therapy and patient is here today for the Evaluation. Patient reports her Knee pain ranges from 8/10 to 10/10. Knee is swollen and she has difficulty putting any weight on the Right leg at this time. She does have crutches but states her arms are hurting from using the crutches so she is here today with no assistive device. Patient will require an MRI as soon as possible but insurance is not allowing MRI at this time.     Patient presents with significantly decreased Right Knee Range of Motion and Strength. Patient has increased edema in the knee.  Swelling noted above the patella at insertion of the quads and along the medial joint line. Therapist was unable to fully perform special tests due to severe pain and difficulty moving the Right Knee in to the correct positions but she was painful with Anterior Drawer, Varus Test, and Thessalys. Patient definitely has some soft tissue damage inside the knee. Patient has difficulty with sit to stand. During standing she keeps her weight fully on the Left Lower Extremity. During ambulation, she keeps the Right Knee slightly flexed and walks on the toe of that foot. She does not flex or extend the Right Knee at all, rather the knee stays locked in approximately 20 degrees of flexion and she circumducts the leg during the swing phase to clear the toe. Ambulation is very painful and she limits walking as much as possible due to severe pain.  Patient will require Physical Therapy Intervention to address all deficits. Therapist will refer patient back to MD for further assessment and possible MRI if pain and dysfunction persist. Feel patient needs MRI as soon as possible.     ROXY Is progressing well towards her goals.   Patient prognosis is Guarded.     Patient will continue to benefit from skilled outpatient physical therapy  to address the deficits listed in the problem list box on initial evaluation, provide pt/family education and to maximize pt's level of independence in the home and community environment.     Patient's spiritual, cultural and educational needs considered and pt agreeable to plan of care and goals.    Anticipated Barriers for therapy: Patient needs MRI as soon as possible due to likely soft tissue damage    Goals:  Short Term Goals: 4 weeks   1. Independent with Home Exercise Program   2. Increase Right Knee Range of Motion to 0 Degrees to 110 Degrees  3. Increase Right Knee Strength to grossly 3+/5  4. Patient will ambulate 100 feet with stance time more equal from Right to Left with complaints of pain Less than or Equal to 6/10.       Long Term Goals: 8 weeks   1. Patient will increase Right Knee Strength to grossly 4/5  2. Patient will ambulate 1000+ feet with complaints of pain Less than or Equal to 3/10.   3. Refer patient back to MD for MRI as soon as possible    Plan     Plan of care Certification: 3/25/2024 to 5/24/2024.     Outpatient Physical Therapy 2 times weekly for 8 weeks to include the following interventions: Electrical Stimulation to increase strength and decrease pain and inflammation as able, Manual Therapy, Moist Heat/ Ice, Neuromuscular Re-ed, Patient Education, Therapeutic Activities, and Therapeutic Exercise.     Tomas Persaud, PTA    4/1/2024

## 2024-04-15 ENCOUNTER — CLINICAL SUPPORT (OUTPATIENT)
Dept: REHABILITATION | Facility: HOSPITAL | Age: 44
End: 2024-04-15
Payer: COMMERCIAL

## 2024-04-15 DIAGNOSIS — M25.661 DECREASED RANGE OF MOTION (ROM) OF RIGHT KNEE: ICD-10-CM

## 2024-04-15 DIAGNOSIS — R29.898 WEAKNESS OF RIGHT LEG: ICD-10-CM

## 2024-04-15 DIAGNOSIS — M25.561 ACUTE PAIN OF RIGHT KNEE: Primary | ICD-10-CM

## 2024-04-15 PROCEDURE — 97110 THERAPEUTIC EXERCISES: CPT | Mod: CQ

## 2024-04-15 PROCEDURE — 97112 NEUROMUSCULAR REEDUCATION: CPT | Mod: CQ

## 2024-04-15 PROCEDURE — 97014 ELECTRIC STIMULATION THERAPY: CPT | Mod: CQ

## 2024-04-15 PROCEDURE — 97010 HOT OR COLD PACKS THERAPY: CPT | Mod: CQ

## 2024-04-18 ENCOUNTER — CLINICAL SUPPORT (OUTPATIENT)
Dept: REHABILITATION | Facility: HOSPITAL | Age: 44
End: 2024-04-18
Payer: COMMERCIAL

## 2024-04-18 DIAGNOSIS — M25.661 DECREASED RANGE OF MOTION (ROM) OF RIGHT KNEE: ICD-10-CM

## 2024-04-18 DIAGNOSIS — R29.898 WEAKNESS OF RIGHT LEG: ICD-10-CM

## 2024-04-18 DIAGNOSIS — M25.561 ACUTE PAIN OF RIGHT KNEE: Primary | ICD-10-CM

## 2024-04-18 PROCEDURE — 97014 ELECTRIC STIMULATION THERAPY: CPT

## 2024-04-18 PROCEDURE — 97110 THERAPEUTIC EXERCISES: CPT

## 2024-04-18 PROCEDURE — 97112 NEUROMUSCULAR REEDUCATION: CPT

## 2024-04-18 NOTE — PROGRESS NOTES
[facility]   Physical Therapy Treatment Note      Name: Pili Love  Clinic Number: [id number]  Visit Date: 4/18/2024    Therapy Diagnosis: Right Knee Pain   Physician: Yaneth Nunez PA     Physician Orders: PT Eval and Treat   Medical Diagnosis from Referral: Right Knee Pain   Evaluation Date: 3/25/2024  Authorization Period Expiration: [employer] Managed   Plan of Care Expiration: 5/24/2024   Progress Note Due: As Needed   Visit # / Visits authorized: 4 / [employer] Managed    PTA Visit #: 2/5   FOTO: 22/100     Precautions: MS     Time In: 3:18 pm   Time Out: 4:08 pm   Total Appointment Time (timed & untimed codes): 45 billed  minutes    Subjective     Patient reports: 8/10 pain upon arrival. She reports 10/10 pain by night  She  has not been given home exercise program yet. Will be given in future visits  Response to previous treatment: first visit since evaluation.   Functional change: n/a    Pain: 8/10  Location: right knee      Objective      Objective Measures updated at progress report unless specified.     Treatment     PILI received the treatments listed below:      therapeutic exercises to develop strength, endurance, ROM, and flexibility for 15 minutes including:   SB stretch 3 x 20 second hold   Hamstring Stretch on Stairs 3 x 20 second hold (RLE)  Knee Flexion Stretch on Stairs 3 x 20 second hold (RLE)    Supine :   Quad Sets x 20  Heel Slides x 20  Short Arc Quads   Straight Leg Raises x 10 (with quad set)  Hip Abduction   Bridging     Sitting :  Marching   LAQs  Hamstring Curls   Hip Abd/Add  Ankle Pumps     manual therapy techniques: - were applied to the: - for - minutes, including:      neuromuscular re-education activities to improve: Balance, Coordination, Kinesthetic, Sense, and Proprioception for 15 minutes. The following activities were included:  QS 20x5 sec hold  SLR 20x5 sec hold  Prone TERMINAL KNEE EXTENSION 10x10 sec hold  LAQ off EOB 30x        therapeutic activities to improve functional performance for 0 minutes, including:    Standing Marches   Squat to chair/Shallow standing knee bends x 10   Hip Abduction at rail x 10 (B)  Heel Raises 2 x 10    Patient received the following supervised modalities after being cleared for contradictions: Pre-Mod Electrical Stimulation:  Patient received Pre-Mod Electrical Stimulation for pain control applied to the right knee. Pt received stimulation at a frequency of  Hz for 15 minutes. Patient tolerated treatment well without any adverse effects.      Ice massage x 5 mins     Patient Education and Home Exercises       Education provided:   - home exercise program handout given of the basic knee home exercise program 4/1/2024.     Written Home Exercises Provided: yes. Exercises were reviewed and PILI was able to demonstrate them prior to the end of the session.  PILI demonstrated good  understanding of the education provided. See Electronic Medical Record under Patient Instructions for exercises provided during therapy sessions    Assessment       Patient continues to have consistent 8/10 pain in her Right knee upon arrival. She reports the pain is 10/10 by night. Right Knee continues to be swollen and dysfunctional. She is unable to perform the bike or any of the standing exercises today. Therapist is addressing the pain and swelling with ice and E stim. We will continue to progress her as able, but she needs an MRI as soon as possible. Sup Visit performed today with ALIS Stratton and ALIS Her.  All goals and treatment plan reviewed. Will work toward completion of all goals set.           PMH from evaluation:  Pili is a 43 y.o. female referred to outpatient Physical Therapy with a medical diagnosis of Right Knee Pain. PILI reports: She was running down a hill and stepped in a gopher hole. She fell and heard a tear/pop in the Right Knee. She had immediate pain in the knee.  [facility]. She ordered Outpatient Physical Therapy and patient is here today for the Evaluation. Patient reports her Knee pain ranges from 8/10 to 10/10. Knee is swollen and she has difficulty putting any weight on the Right leg at this time. She does have crutches but states her arms are hurting from using the crutches so she is here today with no assistive device. Patient will require an MRI as soon as possible but insurance is not allowing MRI at this time.     Patient presents with significantly decreased Right Knee Range of Motion and Strength. Patient has increased edema in the knee.  Swelling noted above the patella at insertion of the quads and along the medial joint line. Therapist was unable to fully perform special tests due to severe pain and difficulty moving the Right Knee in to the correct positions but she was painful with Anterior Drawer, Varus Test, and Thessalys. Patient definitely has some soft tissue damage inside the knee. Patient has difficulty with sit to stand. During standing she keeps her weight fully on the Left Lower Extremity. During ambulation, she keeps the Right Knee slightly flexed and walks on the toe of that foot. She does not flex or extend the Right Knee at all, rather the knee stays locked in approximately 20 degrees of flexion and she circumducts the leg during the swing phase to clear the toe. Ambulation is very painful and she limits walking as much as possible due to severe pain.  Patient will require Physical Therapy Intervention to address all deficits. Therapist will refer patient back to MD for further assessment and possible MRI if pain and dysfunction persist. Feel patient needs MRI as soon as possible.     ROXY Is progressing well towards her goals.   Patient prognosis is Guarded.     Patient will continue to benefit from skilled outpatient physical therapy to address the deficits listed in the problem list  box on initial evaluation, provide pt/family education and to maximize pt's level of independence in the home and community environment.     Patient's spiritual, cultural and educational needs considered and pt agreeable to plan of care and goals.    Anticipated Barriers for therapy: Patient needs MRI as soon as possible due to likely soft tissue damage    Goals:  Short Term Goals: 4 weeks   1. Independent with Home Exercise Program   2. Increase Right Knee Range of Motion to 0 Degrees to 110 Degrees  3. Increase Right Knee Strength to grossly 3+/5  4. Patient will ambulate 100 feet with stance time more equal from Right to Left with complaints of pain Less than or Equal to 6/10.       Long Term Goals: 8 weeks   1. Patient will increase Right Knee Strength to grossly 4/5  2. Patient will ambulate 1000+ feet with complaints of pain Less than or Equal to 3/10.   3. Refer patient back to MD for MRI as soon as possible    Plan     Plan of care Certification: 3/25/2024 to 5/24/2024.     Outpatient Physical Therapy 2 times weekly for 8 weeks to include the following interventions: Electrical Stimulation to increase strength and decrease pain and inflammation as able, Manual Therapy, Moist Heat/ Ice, Neuromuscular Re-ed, Patient Education, Therapeutic Activities, and Therapeutic Exercise.     CACHORRO MOORE, PT, DPT    4/18/2024

## 2024-04-25 ENCOUNTER — CLINICAL SUPPORT (OUTPATIENT)
Dept: REHABILITATION | Facility: HOSPITAL | Age: 44
End: 2024-04-25
Payer: COMMERCIAL

## 2024-04-25 DIAGNOSIS — R29.898 WEAKNESS OF RIGHT LEG: ICD-10-CM

## 2024-04-25 DIAGNOSIS — M25.661 DECREASED RANGE OF MOTION (ROM) OF RIGHT KNEE: ICD-10-CM

## 2024-04-25 DIAGNOSIS — M25.561 ACUTE PAIN OF RIGHT KNEE: Primary | ICD-10-CM

## 2024-04-25 PROCEDURE — 97112 NEUROMUSCULAR REEDUCATION: CPT | Mod: CQ

## 2024-04-25 PROCEDURE — 97016 VASOPNEUMATIC DEVICE THERAPY: CPT | Mod: CQ

## 2024-04-25 NOTE — PROGRESS NOTES
[facility]   Physical Therapy Treatment Note      Name: Pili Love  Clinic Number: [id number]  Visit Date: 4/25/2024    Therapy Diagnosis: Right Knee Pain   Physician: Yaneth Nunez PA     Physician Orders: PT Eval and Treat   Medical Diagnosis from Referral: Right Knee Pain   Evaluation Date: 3/25/2024  Authorization Period Expiration: [employer] Managed   Plan of Care Expiration: 5/24/2024   Progress Note Due: As Needed   Visit # / Visits authorized: 4 / [employer] Managed    PTA Visit #: 2/5   FOTO: 22/100     Precautions: MS     Time In: 4:06 pm   Time Out: 4:58 pm   Total Appointment Time (timed & untimed codes): 33 billed  minutes    Subjective     Patient reports: 8/10 pain upon arrival. She reports 10/10 pain by night  She  has not been given home exercise program yet. Will be given in future visits  Response to previous treatment: first visit since evaluation.   Functional change: n/a    Pain: 8/10  Location: right knee      Objective      Objective Measures updated at progress report unless specified.     Treatment     PILI received the treatments listed below:      therapeutic exercises to develop strength, endurance, ROM, and flexibility for 7 minutes including:   SB stretch 10 x 10 second hold   Hamstring Stretch on Stairs 4 x 20 second hold (RLE)  Knee Flexion Stretch on Stairs 4 x 20 second hold (RLE)    MHP with Heel Prop x 5 mins    Sitting :  Marching   LAQs  Hamstring Curls   Hip Abd/Add  Ankle Pumps     manual therapy techniques: - were applied to the: - for - minutes, including:      neuromuscular re-education activities to improve: Balance, Coordination, Kinesthetic, Sense, and Proprioception for 8 minutes. The following activities were included:  QS 20x5 sec hold  SLR 20x5 sec hold  LAQ off EOB 30x       therapeutic activities to improve functional performance for 0 minutes, including:    Standing Marches   Squat to chair/Shallow standing knee bends x 10    Hip Abduction at rail x 10 (B)  Heel Raises 2 x 10    Patient received the following supervised modalities after being cleared for contradictions: Pre-Mod Electrical Stimulation:  Patient received Pre-Mod Electrical Stimulation for pain control applied to the right knee. Pt received stimulation at a frequency of  Hz for 0 minutes. Patient tolerated treatment well without any adverse effects.      GameReady x 15 minutes end of session    Patient Education and Home Exercises       Education provided:   - home exercise program handout given of the basic knee home exercise program 4/1/2024.     Written Home Exercises Provided: yes. Exercises were reviewed and PILI was able to demonstrate them prior to the end of the session.  PILI demonstrated good  understanding of the education provided. See Electronic Medical Record under Patient Instructions for exercises provided during therapy sessions    Assessment     Continued pain in her R knee and loss of knee extension. Started session with MHP/Heel Prop with resting extension measured at -2 afterwards. Focused on light quad setting and stretching after this to help with mobility and knee function. Educated pt to keep up with her HEP diligently. Time billed reflects time spent one on one with pt.       PMH from evaluation:  Pili is a 43 y.o. female referred to outpatient Physical Therapy with a medical diagnosis of Right Knee Pain. PILI reports: She was running down a hill and stepped in a gopher hole. She fell and heard a tear/pop in the Right Knee. She had immediate pain in the knee. She [facility]. She ordered Outpatient Physical Therapy and patient is here today for the Evaluation. Patient reports her Knee pain ranges from 8/10 to 10/10. Knee is swollen and she has difficulty putting any weight on the Right leg at this time. She does have crutches but states her arms are hurting from using the  crutches so she is here today with no assistive device. Patient will require an MRI as soon as possible but insurance is not allowing MRI at this time.     Patient presents with significantly decreased Right Knee Range of Motion and Strength. Patient has increased edema in the knee.  Swelling noted above the patella at insertion of the quads and along the medial joint line. Therapist was unable to fully perform special tests due to severe pain and difficulty moving the Right Knee in to the correct positions but she was painful with Anterior Drawer, Varus Test, and Thessalys. Patient definitely has some soft tissue damage inside the knee. Patient has difficulty with sit to stand. During standing she keeps her weight fully on the Left Lower Extremity. During ambulation, she keeps the Right Knee slightly flexed and walks on the toe of that foot. She does not flex or extend the Right Knee at all, rather the knee stays locked in approximately 20 degrees of flexion and she circumducts the leg during the swing phase to clear the toe. Ambulation is very painful and she limits walking as much as possible due to severe pain.  Patient will require Physical Therapy Intervention to address all deficits. Therapist will refer patient back to MD for further assessment and possible MRI if pain and dysfunction persist. Feel patient needs MRI as soon as possible.     ROXY Is progressing well towards her goals.   Patient prognosis is Guarded.     Patient will continue to benefit from skilled outpatient physical therapy to address the deficits listed in the problem list box on initial evaluation, provide pt/family education and to maximize pt's level of independence in the home and community environment.     Patient's spiritual, cultural and educational needs considered and pt agreeable to plan of care and goals.    Anticipated Barriers for therapy: Patient needs MRI as soon as possible due to likely soft tissue  damage    Goals:  Short Term Goals: 4 weeks   1. Independent with Home Exercise Program   2. Increase Right Knee Range of Motion to 0 Degrees to 110 Degrees  3. Increase Right Knee Strength to grossly 3+/5  4. Patient will ambulate 100 feet with stance time more equal from Right to Left with complaints of pain Less than or Equal to 6/10.       Long Term Goals: 8 weeks   1. Patient will increase Right Knee Strength to grossly 4/5  2. Patient will ambulate 1000+ feet with complaints of pain Less than or Equal to 3/10.   3. Refer patient back to MD for MRI as soon as possible    Plan     Plan of care Certification: 3/25/2024 to 5/24/2024.     Outpatient Physical Therapy 2 times weekly for 8 weeks to include the following interventions: Electrical Stimulation to increase strength and decrease pain and inflammation as able, Manual Therapy, Moist Heat/ Ice, Neuromuscular Re-ed, Patient Education, Therapeutic Activities, and Therapeutic Exercise.     Warner Bowman, PTA   4/25/2024

## 2024-04-30 ENCOUNTER — TELEPHONE (OUTPATIENT)
Dept: ORTHOPEDICS | Facility: CLINIC | Age: 44
End: 2024-04-30
Payer: COMMERCIAL

## 2024-04-30 DIAGNOSIS — M25.561 ACUTE PAIN OF RIGHT KNEE: Primary | ICD-10-CM

## 2024-04-30 NOTE — TELEPHONE ENCOUNTER
----- Message from DUY Montes sent at 4/30/2024  8:27 AM CDT -----  I have ordered an MRI, place call and schedule this for her and then call her to let her know of the date of her MRI  ----- Message -----  From: Vivi Abel  Sent: 4/26/2024   9:27 AM CDT  To: Enrique Goldberg Staff    PATIENT NEED FOR YOU TO CALL HER AT THIS NUMBER [phone], Patient wanted to let you know that her Insurance Company said that she had to have Physical therapy first then the MRI she just finished 4 wk of therapy could you resubmit the order for MRI

## 2024-05-02 ENCOUNTER — CLINICAL SUPPORT (OUTPATIENT)
Dept: REHABILITATION | Facility: HOSPITAL | Age: 44
End: 2024-05-02
Payer: COMMERCIAL

## 2024-05-02 DIAGNOSIS — M25.561 ACUTE PAIN OF RIGHT KNEE: Primary | ICD-10-CM

## 2024-05-02 DIAGNOSIS — M25.661 DECREASED RANGE OF MOTION (ROM) OF RIGHT KNEE: ICD-10-CM

## 2024-05-02 DIAGNOSIS — R29.898 WEAKNESS OF RIGHT LEG: ICD-10-CM

## 2024-05-02 PROCEDURE — 97112 NEUROMUSCULAR REEDUCATION: CPT | Mod: CQ

## 2024-05-02 PROCEDURE — 97016 VASOPNEUMATIC DEVICE THERAPY: CPT | Mod: CQ

## 2024-05-02 NOTE — PROGRESS NOTES
[facility]   Physical Therapy Treatment Note      Name: Pili Love  Clinic Number: [id number]  Visit Date: 5/2/2024    Therapy Diagnosis: Right Knee Pain   Physician: Yaneth Nunez PA     Physician Orders: PT Eval and Treat   Medical Diagnosis from Referral: Right Knee Pain   Evaluation Date: 3/25/2024  Authorization Period Expiration: [employer] Managed   Plan of Care Expiration: 5/24/2024   Progress Note Due: As Needed   Visit # / Visits authorized: 6 / [employer] Managed    PTA Visit #: 4/5   FOTO: 22/100     Precautions: MS     Time In: 4:10 pm   Time Out: 5:02 pm   Total Appointment Time (timed & untimed codes): 35 billed  minutes    Subjective     Patient reports: knee is sore today but she has been working on her HEP  She  has not been given home exercise program yet. Will be given in future visits  Response to previous treatment: first visit since evaluation.   Functional change: n/a    Pain: 8/10  Location: right knee      Objective      Objective Measures updated at progress report unless specified.     0 Degrees resting extension upon arrival on 5/2/2024    Treatment     PILI received the treatments listed below:      therapeutic exercises to develop strength, endurance, ROM, and flexibility for 5 minutes including:     MHP with Heel Prop x 5 mins  Prone Quad Stretch 5x30 sec      manual therapy techniques: - were applied to the: - for - minutes, including:      neuromuscular re-education activities to improve: Balance, Coordination, Kinesthetic, Sense, and Proprioception for 15 minutes. The following activities were included:  QS 20x5 sec hold  SLR 20x5 sec hold  LAQ off EOB 30x   Prone TERMINAL KNEE EXTENSION 10x10 sec  Long Bridges 20x5 sec hold      therapeutic activities to improve functional performance for 0 minutes, including:    Standing Marches   Squat to chair/Shallow standing knee bends x 10   Hip Abduction at rail x 10 (B)  Heel Raises 2 x 10    Patient  received the following supervised modalities after being cleared for contradictions: Pre-Mod Electrical Stimulation:  Patient received Pre-Mod Electrical Stimulation for pain control applied to the right knee. Pt received stimulation at a frequency of  Hz for 0 minutes. Patient tolerated treatment well without any adverse effects.      GameReady x 15 minutes end of session    Patient Education and Home Exercises       Education provided:   - home exercise program handout given of the basic knee home exercise program 4/1/2024.     Written Home Exercises Provided: yes. Exercises were reviewed and PILI was able to demonstrate them prior to the end of the session.  PILI demonstrated good  understanding of the education provided. See Electronic Medical Record under Patient Instructions for exercises provided during therapy sessions    Assessment     Resting knee extension is neutral upon arrival which is a big improvement. Focused on quad setting exercises with some fatigue and pain noted. Pt demonstrate improved quad contraction and control. Ended session with GameReady to help with residual pain. Time billed reflects time spent one on one with pt      PMH from evaluation:  Pili is a 43 y.o. female referred to outpatient Physical Therapy with a medical diagnosis of Right Knee Pain. PILI reports: She was running down a hill and stepped in a gopher hole. She fell and heard a tear/pop in the Right Knee. She had immediate pain in the knee. She did [facility]24. She ordered Outpatient Physical Therapy and patient is here today for the Evaluation. Patient reports her Knee pain ranges from 8/10 to 10/10. Knee is swollen and she has difficulty putting any weight on the Right leg at this time. She does have crutches but states her arms are hurting from using the crutches so she is here today with no assistive device. Patient will require an MRI as soon as possible  but insurance is not allowing MRI at this time.     Patient presents with significantly decreased Right Knee Range of Motion and Strength. Patient has increased edema in the knee.  Swelling noted above the patella at insertion of the quads and along the medial joint line. Therapist was unable to fully perform special tests due to severe pain and difficulty moving the Right Knee in to the correct positions but she was painful with Anterior Drawer, Varus Test, and Thessalys. Patient definitely has some soft tissue damage inside the knee. Patient has difficulty with sit to stand. During standing she keeps her weight fully on the Left Lower Extremity. During ambulation, she keeps the Right Knee slightly flexed and walks on the toe of that foot. She does not flex or extend the Right Knee at all, rather the knee stays locked in approximately 20 degrees of flexion and she circumducts the leg during the swing phase to clear the toe. Ambulation is very painful and she limits walking as much as possible due to severe pain.  Patient will require Physical Therapy Intervention to address all deficits. Therapist will refer patient back to MD for further assessment and possible MRI if pain and dysfunction persist. Feel patient needs MRI as soon as possible.     ROXY Is progressing well towards her goals.   Patient prognosis is Guarded.     Patient will continue to benefit from skilled outpatient physical therapy to address the deficits listed in the problem list box on initial evaluation, provide pt/family education and to maximize pt's level of independence in the home and community environment.     Patient's spiritual, cultural and educational needs considered and pt agreeable to plan of care and goals.    Anticipated Barriers for therapy: Patient needs MRI as soon as possible due to likely soft tissue damage    Goals:  Short Term Goals: 4 weeks   1. Independent with Home Exercise Program   2. Increase Right Knee Range of  Motion to 0 Degrees to 110 Degrees  3. Increase Right Knee Strength to grossly 3+/5  4. Patient will ambulate 100 feet with stance time more equal from Right to Left with complaints of pain Less than or Equal to 6/10.       Long Term Goals: 8 weeks   1. Patient will increase Right Knee Strength to grossly 4/5  2. Patient will ambulate 1000+ feet with complaints of pain Less than or Equal to 3/10.   3. Refer patient back to MD for MRI as soon as possible    Plan     Plan of care Certification: 3/25/2024 to 5/24/2024.     Outpatient Physical Therapy 2 times weekly for 8 weeks to include the following interventions: Electrical Stimulation to increase strength and decrease pain and inflammation as able, Manual Therapy, Moist Heat/ Ice, Neuromuscular Re-ed, Patient Education, Therapeutic Activities, and Therapeutic Exercise.     Warner Bowman, PTA   5/2/2024

## 2024-05-09 ENCOUNTER — CLINICAL SUPPORT (OUTPATIENT)
Dept: REHABILITATION | Facility: HOSPITAL | Age: 44
End: 2024-05-09
Payer: COMMERCIAL

## 2024-05-09 DIAGNOSIS — R29.898 WEAKNESS OF RIGHT LEG: ICD-10-CM

## 2024-05-09 DIAGNOSIS — M25.561 ACUTE PAIN OF RIGHT KNEE: Primary | ICD-10-CM

## 2024-05-09 DIAGNOSIS — M25.661 DECREASED RANGE OF MOTION (ROM) OF RIGHT KNEE: ICD-10-CM

## 2024-05-09 PROCEDURE — 97016 VASOPNEUMATIC DEVICE THERAPY: CPT | Mod: CQ

## 2024-05-09 PROCEDURE — 97112 NEUROMUSCULAR REEDUCATION: CPT | Mod: CQ

## 2024-05-09 PROCEDURE — 97530 THERAPEUTIC ACTIVITIES: CPT | Mod: CQ

## 2024-05-09 NOTE — PROGRESS NOTES
[facility]   Physical Therapy Treatment Note      Name: Roxy Love  Clinic Number: [id number]  Visit Date: 5/9/2024    Therapy Diagnosis: Right Knee Pain   Physician: Yaneth Nunez PA     Physician Orders: PT Eval and Treat   Medical Diagnosis from Referral: Right Knee Pain   Evaluation Date: 3/25/2024  Authorization Period Expiration: Maryam Managed   Plan of Care Expiration: 5/24/2024   Progress Note Due: As Needed   Visit # / Visits authorized: 7 / Ambetter Managed    PTA Visit #: 5/5   FOTO: 22/100 at initial evaluation   FOTO: 25/100    Precautions: MS     Time In: 4:00 pm   Time Out: 4:48 pm   Total Appointment Time (timed & untimed codes): 48 billed  minutes    Subjective     Patient reports: continued pain in the right knee. She is ready to discharge today, Has MRI of the right knee on 5/17.   She was compliant with home exercise program.  Response to previous treatment:continued pain and dysf  Functional change: n/a    Pain: 8/10  Location: right knee      Objective      Objective Measures updated at progress report unless specified.     Treatment     ROXY received the treatments listed below:      therapeutic exercises to develop strength, endurance, ROM, and flexibility for 5 minutes including:       MHP with Heel Prop x 5 mins    manual therapy techniques: - were applied to the: - for - minutes, including:      neuromuscular re-education activities to improve: Balance, Coordination, Kinesthetic, Sense, and Proprioception for 15 minutes. The following activities were included:    QS 20x5 sec hold  SLR 20x5 sec hold  LAQ off EOB 20x     therapeutic activities to improve functional performance for 10 minutes, including:  Home exercise program review of the basic knee home exercise program. Handout issued to Patient with pictures and written instructions. Education provided for each exercise. Patient was able to return demonstration with proper form.       Patient  received the following supervised modalities after being cleared for contradictions: Pre-Mod Electrical Stimulation:  Patient received Pre-Mod Electrical Stimulation for pain control applied to the right knee. Pt received stimulation at a frequency of  Hz for 0 minutes. Patient tolerated treatment well without any adverse effects.      GameReady x 15 minutes end of session    Patient Education and Home Exercises       Education provided:   - home exercise program handout given of the basic knee home exercise program 4/1/2024.     Written Home Exercises Provided: yes. Exercises were reviewed and PILI was able to demonstrate them prior to the end of the session.  PILI demonstrated good  understanding of the education provided. See Electronic Medical Record under Patient Instructions for exercises provided during therapy sessions    Assessment       Range of Motion/Strength :         Right Extremity at evaluation 3/25/2024   Right knee ROM 5/9/2024    Hip       Full     4/5 (due to knee pain)       Knee    Flexion (140)  80   85  2/5 85 88                  Extension (0)  -40  -20   -11 -8     Ankle     Full     5/5                   Patient arrived with continued reports of pain in the right knee. She has now completed 6 weeks of therapy. Her MRI is scheduled for 5/17 and she has an appt to follow up with Dr. Palmer on June 10th. Patient has worked hard in therapy but been very limited due to the pain and dysfunction in that right knee.  Therapist reprinted home exercise program handout of the basic knee home exercise program and Patient verbalized understanding. Patient is being discharged from therapy due to plateau in progress and failure to meet all goals. Will refer Patient back to MD for further options, but feel as if Patient will require surgery depending on what MRI reveals.       PMH from evaluation:  Pili is a 43 y.o. female referred to outpatient Physical Therapy with a medical  diagnosis of Right Knee Pain. ROXY reports: She was running down a hill and stepped in a gopher hole. She fell and heard a tear/pop in the Right Knee. She had immediate pain in the knee. [facility]. She ordered Outpatient Physical Therapy and patient is here today for the Evaluation. Patient reports her Knee pain ranges from 8/10 to 10/10. Knee is swollen and she has difficulty putting any weight on the Right leg at this time. She does have crutches but states her arms are hurting from using the crutches so she is here today with no assistive device. Patient will require an MRI as soon as possible but insurance is not allowing MRI at this time.     Patient presents with significantly decreased Right Knee Range of Motion and Strength. Patient has increased edema in the knee.  Swelling noted above the patella at insertion of the quads and along the medial joint line. Therapist was unable to fully perform special tests due to severe pain and difficulty moving the Right Knee in to the correct positions but she was painful with Anterior Drawer, Varus Test, and Thessalys. Patient definitely has some soft tissue damage inside the knee. Patient has difficulty with sit to stand. During standing she keeps her weight fully on the Left Lower Extremity. During ambulation, she keeps the Right Knee slightly flexed and walks on the toe of that foot. She does not flex or extend the Right Knee at all, rather the knee stays locked in approximately 20 degrees of flexion and she circumducts the leg during the swing phase to clear the toe. Ambulation is very painful and she limits walking as much as possible due to severe pain.  Patient will require Physical Therapy Intervention to address all deficits. Therapist will refer patient back to MD for further assessment and possible MRI if pain and dysfunction persist. Feel patient needs MRI as soon as possible.     ROXY Is  progressing well towards her goals.   Patient prognosis is Guarded.     Patient will continue to benefit from skilled outpatient physical therapy to address the deficits listed in the problem list box on initial evaluation, provide pt/family education and to maximize pt's level of independence in the home and community environment.     Patient's spiritual, cultural and educational needs considered and pt agreeable to plan of care and goals.    Anticipated Barriers for therapy: Patient needs MRI as soon as possible due to likely soft tissue damage    Goals:  Short Term Goals: 4 weeks   1. Independent with Home Exercise Program   2. Increase Right Knee Range of Motion to 0 Degrees to 110 Degrees  3. Increase Right Knee Strength to grossly 3+/5  4. Patient will ambulate 100 feet with stance time more equal from Right to Left with complaints of pain Less than or Equal to 6/10.       Long Term Goals: 8 weeks   1. Patient will increase Right Knee Strength to grossly 4/5  2. Patient will ambulate 1000+ feet with complaints of pain Less than or Equal to 3/10.   3. Refer patient back to MD for MRI as soon as possible    Plan     Patient to be Discharged from Physical Therapy services following today's treatment. See Discharge Summary if needed.      Tomas Persaud, PTA   5/9/2024

## 2024-05-10 ENCOUNTER — PATIENT MESSAGE (OUTPATIENT)
Dept: ORTHOPEDICS | Facility: CLINIC | Age: 44
End: 2024-05-10
Payer: COMMERCIAL

## 2024-05-13 ENCOUNTER — TELEPHONE (OUTPATIENT)
Dept: REHABILITATION | Facility: HOSPITAL | Age: 44
End: 2024-05-13
Payer: COMMERCIAL

## 2024-05-13 PROBLEM — M25.561 ACUTE PAIN OF RIGHT KNEE: Status: RESOLVED | Noted: 2024-03-25 | Resolved: 2024-05-13

## 2024-05-13 PROBLEM — R29.898 WEAKNESS OF RIGHT LEG: Status: RESOLVED | Noted: 2024-03-25 | Resolved: 2024-05-13

## 2024-05-13 PROBLEM — M25.661 DECREASED RANGE OF MOTION (ROM) OF RIGHT KNEE: Status: RESOLVED | Noted: 2024-03-25 | Resolved: 2024-05-13

## 2024-05-13 NOTE — TELEPHONE ENCOUNTER
Therapist contacted Patient on 5/13 notifying her of missed therapy appts. Patient informed Therapist she had forgotten about her last few appts due to her  being in ICU and taking care of him. Consulted with evaluating therapist about this and informed Patient of her next therapy appt on 5/15 at 1:45pm to plan to discharge from therapy to upgraded home exercise program at that time. Patient verbalized understanding.     Tomas Persaud, PTA  5/13/2024

## 2024-05-14 NOTE — PLAN OF CARE
[facility]   Physical Therapy Discharge Summary      Name: Roxy Love  Clinic Number: [id number]  Visit Date: 5/9/2024    Therapy Diagnosis: Right Knee Pain   Physician: Yaneth Nunez PA     Physician Orders: PT Eval and Treat   Medical Diagnosis from Referral: Right Knee Pain   Evaluation Date: 3/25/2024  Authorization Period Expiration: Maryam Quezada   Plan of Care Expiration: 5/24/2024   Progress Note Due: As Needed   Visit # / Visits authorized: 7 / Ambetter Managed    PTA Visit #: 5/5   FOTO: 22/100 at initial evaluation   FOTO: 25/100    Precautions: MS     Time In: 4:00 pm   Time Out: 4:48 pm   Total Appointment Time (timed & untimed codes): 48 billed  minutes    Subjective     Patient reports: continued pain in the right knee. She is ready to discharge today, Has MRI of the right knee on 5/17.   She was compliant with home exercise program.  Response to previous treatment:continued pain and dysf  Functional change: n/a    Pain: 8/10  Location: right knee      Objective      Objective Measures updated at progress report unless specified.     Treatment     ROXY received the treatments listed below:      therapeutic exercises to develop strength, endurance, ROM, and flexibility for 5 minutes including:       MHP with Heel Prop x 5 mins    manual therapy techniques: - were applied to the: - for - minutes, including:      neuromuscular re-education activities to improve: Balance, Coordination, Kinesthetic, Sense, and Proprioception for 15 minutes. The following activities were included:    QS 20x5 sec hold  SLR 20x5 sec hold  LAQ off EOB 20x     therapeutic activities to improve functional performance for 10 minutes, including:  Home exercise program review of the basic knee home exercise program. Handout issued to Patient with pictures and written instructions. Education provided for each exercise. Patient was able to return demonstration with proper form.       Patient  received the following supervised modalities after being cleared for contradictions: Pre-Mod Electrical Stimulation:  Patient received Pre-Mod Electrical Stimulation for pain control applied to the right knee. Pt received stimulation at a frequency of  Hz for 0 minutes. Patient tolerated treatment well without any adverse effects.      GameReady x 15 minutes end of session    Patient Education and Home Exercises       Education provided:   - home exercise program handout given of the basic knee home exercise program 4/1/2024.     Written Home Exercises Provided: yes. Exercises were reviewed and PILI was able to demonstrate them prior to the end of the session.  PILI demonstrated good  understanding of the education provided. See Electronic Medical Record under Patient Instructions for exercises provided during therapy sessions    Assessment       Range of Motion/Strength :         Right Extremity at evaluation 3/25/2024   Right knee ROM 5/9/2024    Hip       Full     4/5 (due to knee pain)       Knee    Flexion (140)  80   85  2/5 85 88                  Extension (0)  -40  -20   -11 -8     Ankle     Full     5/5                   Patient arrived with continued reports of pain in the right knee. She has now completed 6 weeks of therapy. Her MRI is scheduled for 5/17 and she has an appt to follow up with Dr. Palmer on June 10th. Patient has worked hard in therapy but been very limited due to the pain and dysfunction in that right knee.  Therapist reprinted home exercise program handout of the basic knee home exercise program and Patient verbalized understanding. Patient is being discharged from therapy due to plateau in progress and failure to meet all goals. Will refer Patient back to MD for further options, but feel as if Patient will require surgery depending on what MRI reveals.       PMH from evaluation:  Pili is a 43 y.o. female referred to outpatient Physical Therapy with a medical  diagnosis of Right Knee Pain. ROXY reports: She was running down a hill and stepped in a gopher hole. She fell and heard a tear/pop in the Right Knee. She had immediate pain in the knee. She [facility]. She ordered Outpatient Physical Therapy and patient is here today for the Evaluation. Patient reports her Knee pain ranges from 8/10 to 10/10. Knee is swollen and she has difficulty putting any weight on the Right leg at this time. She does have crutches but states her arms are hurting from using the crutches so she is here today with no assistive device. Patient will require an MRI as soon as possible but insurance is not allowing MRI at this time.     Patient presents with significantly decreased Right Knee Range of Motion and Strength. Patient has increased edema in the knee.  Swelling noted above the patella at insertion of the quads and along the medial joint line. Therapist was unable to fully perform special tests due to severe pain and difficulty moving the Right Knee in to the correct positions but she was painful with Anterior Drawer, Varus Test, and Thessalys. Patient definitely has some soft tissue damage inside the knee. Patient has difficulty with sit to stand. During standing she keeps her weight fully on the Left Lower Extremity. During ambulation, she keeps the Right Knee slightly flexed and walks on the toe of that foot. She does not flex or extend the Right Knee at all, rather the knee stays locked in approximately 20 degrees of flexion and she circumducts the leg during the swing phase to clear the toe. Ambulation is very painful and she limits walking as much as possible due to severe pain.  Patient will require Physical Therapy Intervention to address all deficits. Therapist will refer patient back to MD for further assessment and possible MRI if pain and dysfunction persist. Feel patient needs MRI as soon as possible.     ROXY Is  progressing well towards her goals.   Patient prognosis is Guarded.     Patient will continue to benefit from skilled outpatient physical therapy to address the deficits listed in the problem list box on initial evaluation, provide pt/family education and to maximize pt's level of independence in the home and community environment.     Patient's spiritual, cultural and educational needs considered and pt agreeable to plan of care and goals.    Anticipated Barriers for therapy: Patient needs MRI as soon as possible due to likely soft tissue damage    Goals:  Short Term Goals: 4 weeks   1. Independent with Home Exercise Program   2. Increase Right Knee Range of Motion to 0 Degrees to 110 Degrees  3. Increase Right Knee Strength to grossly 3+/5  4. Patient will ambulate 100 feet with stance time more equal from Right to Left with complaints of pain Less than or Equal to 6/10.       Long Term Goals: 8 weeks   1. Patient will increase Right Knee Strength to grossly 4/5  2. Patient will ambulate 1000+ feet with complaints of pain Less than or Equal to 3/10.   3. Refer patient back to MD for MRI as soon as possible    Discharge reason: Patient has reached the maximum rehab potential for the present time    Plan   This patient is discharged from Physical Therapy.    Date of Last visit: 5/9/2024  Total Visits Received: 7  Cancelled Visits: 0  No Show Visits: 0    CACHORRO H TERESA, PT, DPT

## 2024-05-14 NOTE — PROGRESS NOTES
Subjective:         Patient ID: Pili Love is a 43 y.o. female.    Chief Complaint: Knee Pain, Low-back Pain, and Shoulder Pain        Pain  This is a chronic problem. The current episode started more than 1 year ago. The problem occurs daily. The problem has been unchanged. Associated symptoms include arthralgias, myalgias and neck pain. Pertinent negatives include no anorexia, chest pain, chills, coughing, diaphoresis, fever, sore throat, vertigo or vomiting.     Review of Systems   Constitutional:  Negative for activity change, appetite change, chills, diaphoresis and fever.   HENT:  Negative for drooling, ear discharge, ear pain, facial swelling, nosebleeds, sore throat, trouble swallowing, voice change and goiter.    Eyes:  Negative for photophobia, pain, discharge, redness and visual disturbance.   Respiratory:  Negative for apnea, cough, choking, chest tightness, shortness of breath, wheezing and stridor.    Cardiovascular:  Negative for chest pain, palpitations and leg swelling.   Gastrointestinal:  Negative for abdominal distention, anorexia, diarrhea, rectal pain, vomiting and fecal incontinence.   Endocrine: Negative for cold intolerance, heat intolerance, polydipsia, polyphagia and polyuria.   Genitourinary:  Negative for flank pain, frequency and hot flashes.   Musculoskeletal:  Positive for arthralgias, back pain, myalgias and neck pain.   Integumentary:  Negative for color change and pallor.   Allergic/Immunologic: Negative for immunocompromised state.   Neurological:  Negative for dizziness, vertigo, seizures, syncope, facial asymmetry, speech difficulty, light-headedness, memory loss and coordination difficulties.   Hematological:  Negative for adenopathy. Does not bruise/bleed easily.   Psychiatric/Behavioral:  Negative for agitation, behavioral problems, confusion, decreased concentration, dysphoric mood, hallucinations, self-injury and suicidal ideas. The patient is not nervous/anxious and  is not hyperactive.            Past Medical History:   Diagnosis Date    Acute bronchitis 3/24/2021    Anxiety     Asthma     Chronic otitis media of both ears 2023    Depression     Endometriosis, unspecified     History of migraine 2023    History of multiple sclerosis 2023    Lumbosacral spondylosis without myelopathy 2022    Migraine     Multiple sclerosis     Dr Antonio     Past Surgical History:   Procedure Laterality Date     bilateral cervical paraspinous muscle trigger point injection Bilateral [id number]    D Shows  x 4     SECTION      FOOT SURGERY Left     left toe fusion    HYSTERECTOMY      INJECTION OF ANESTHETIC AGENT AROUND MEDIAL BRANCH NERVES INNERVATING LUMBAR FACET JOINT Bilateral 2022    Procedure: BLOCK, NERVE, FACET JOINT, LUMBAR, MEDIAL BRANCH;  Surgeon: Twyla Gaston MD;  Location: [facility];  Service: Pain Management;  Laterality: Bilateral;    INJECTION OF ANESTHETIC AGENT AROUND MEDIAL BRANCH NERVES INNERVATING LUMBAR FACET JOINT Bilateral 11/10/2022    Procedure: Block-nerve-medial branch-lumbar, bilateral L4 through S1;  Surgeon: Twyla Gaston MD;  Location: [facility];  Service: Pain Management;  Laterality: Bilateral;  patient will have to be tested    KNEE CARTILAGE SURGERY Right     LUMBAR PUNCTURE      Dr Trejo    MYRINGOTOMY WITH INSERTION OF VENTILATION TUBE Bilateral 2023    Procedure: MYRINGOTOMY, WITH TYMPANOSTOMY TUBE INSERTION;  Surgeon: Gagan Francis MD;  Location: [facility];  Service: ENT;  Laterality: Bilateral;    RADIOFREQUENCY ABLATION OF LUMBAR MEDIAL BRANCH NERVE AT SINGLE LEVEL Right 12/15/2022    Procedure: Radiofrequency Ablation, Nerve, Spinal, Lumbar, Medial Branch, Level L4-S1;  Surgeon: Twyla Gaston MD;  Location: [facility];  Service: Pain Management;  Laterality: Right;  cong left after right side is done    RADIOFREQUENCY ABLATION OF LUMBAR MEDIAL BRANCH NERVE AT SINGLE  "LEVEL Left 1/5/2023    Procedure: RADIOFREQUENCY ABLATION, NERVE, SPINAL, LUMBAR, MEDIAL BRANCH, 1 LEVEL;  Surgeon: Twyla Gaston MD;  Location: [facility];  Service: Pain Management;  Laterality: Left;  Left L4-S1 RFTC. Pt had right on 12/15    Right C3-C7 FI Right 04/17/2018    Dr Trejo    TUBAL LIGATION      VAGINAL DELIVERY       Social History     Socioeconomic History    Marital status: Single   Tobacco Use    Smoking status: Never    Smokeless tobacco: Never   Substance and Sexual Activity    Alcohol use: Never    Drug use: Never    Sexual activity: Yes     Birth control/protection: See Surgical Hx     Social Determinants of Health     Physical Activity: Inactive (8/30/2023)    Exercise Vital Sign     Days of Exercise per Week: 0 days     Minutes of Exercise per Session: 0 min   Stress: No Stress Concern Present (8/30/2023)    [place] [employer] - Occupational Stress Questionnaire     Feeling of Stress : Not at all     Family History   Problem Relation Name Age of Onset    Hypertension Paternal Grandfather      Heart attack Paternal Grandfather      Cancer Maternal Grandfather      Hypertension Father      Asthma Father       Review of patient's allergies indicates:   Allergen Reactions    Cockroach     Grass pollen-bermuda, standard     Grass pollen-veronica, standard     Latex, natural rubber     Peanuts [peanut (legumes)] Other (See Comments)     Allergy testing    Penicillins     Wheat containing prod Other (See Comments)     Allergy testing        Objective:  Vitals:    05/21/24 1351   BP: 124/85   Pulse: 95   Resp: 18   Weight: 64.9 kg (143 lb)   Height: 5' 3" (1.6 m)   PainSc:   8           Physical Exam  Vitals and nursing note reviewed. Exam conducted with a chaperone present.   Constitutional:       General: She is awake. She is not in acute distress.     Appearance: Normal appearance. She is not ill-appearing or diaphoretic.   HENT:      Head: Normocephalic and " atraumatic.      Nose: Nose normal.      Mouth/Throat:      Mouth: Mucous membranes are moist.      Pharynx: Oropharynx is clear.   Eyes:      Conjunctiva/sclera: Conjunctivae normal.      Pupils: Pupils are equal, round, and reactive to light.   Cardiovascular:      Rate and Rhythm: Normal rate.   Pulmonary:      Effort: Pulmonary effort is normal. No respiratory distress.   Abdominal:      Palpations: Abdomen is soft.      Tenderness: There is no guarding.   Musculoskeletal:         General: Normal range of motion.      Cervical back: Normal range of motion and neck supple. Tenderness present. No rigidity.      Thoracic back: Tenderness present.      Lumbar back: Tenderness present.   Skin:     General: Skin is warm and dry.      Coloration: Skin is not jaundiced or pale.   Neurological:      General: No focal deficit present.      Mental Status: She is alert and oriented to person, place, and time. Mental status is at baseline.      Cranial Nerves: No cranial nerve deficit (II-XII).   Psychiatric:         Mood and Affect: Mood normal.         Behavior: Behavior normal. Behavior is cooperative.         Thought Content: Thought content normal.           MRI Knee Without Contrast Right  Narrative: EXAMINATION:  MRI KNEE WITHOUT CONTRAST RIGHT    CLINICAL HISTORY:  Meniscal tear, previous surgery, new symptoms; Pain in right knee    TECHNIQUE:  Axial sagittal and coronal imaging of the right knee is performed using T1, proton density, proton density fat-sat, STIR and gradient sequences.    COMPARISON:  None available    FINDINGS:  The anterior and posterior cruciate ligaments are intact without evidence of tear.    The posterior horn medial meniscus has irregular contour and slightly heterogeneous signal there is blunting of the body of the meniscus in the meniscus is slightly extruded from the joint.  There is loss of articular cartilage and small osteophytes in the medial compartment.    Lateral meniscus appears  within normal limits.    Small amount of cystic change present in the proximal tibia near the root ligament attachment of the anterior horn lateral meniscus.    The medial and lateral collateral ligament complexes are intact without evidence of abnormality.    There is pitting of articular cartilage and small amount of subchondral changes in the patella.  Otherwise the extensor mechanism is intact and appears within normal limits.    No other abnormal osseous of marrow signal or edema is seen. No focal cartilage defect is present.  Impression: Complex tear and degeneration posterior horn body medial meniscus.  Mild knee osteoarthrosis.    Electronically signed by: Rajesh Olmedo  Date:    05/17/2024  Time:    09:58         Lab Visit on 05/20/2024   Component Date Value Ref Range Status    Sodium 05/20/2024 138  136 - 145 mmol/L Final    Potassium 05/20/2024 4.4  3.5 - 5.1 mmol/L Final    Chloride 05/20/2024 108 (H)  98 - 107 mmol/L Final    CO2 05/20/2024 28  21 - 32 mmol/L Final    Anion Gap 05/20/2024 6 (L)  7 - 16 mmol/L Final    Glucose 05/20/2024 67 (L)  74 - 106 mg/dL Final    BUN 05/20/2024 12  7 - 18 mg/dL Final    Creatinine 05/20/2024 0.98  0.55 - 1.02 mg/dL Final    BUN/Creatinine Ratio 05/20/2024 12  6 - 20 Final    Calcium 05/20/2024 9.4  8.5 - 10.1 mg/dL Final    eGFR 05/20/2024 73  >=60 mL/min/1.73m2 Final    WBC 05/20/2024 8.91  4.50 - 11.00 K/uL Final    RBC 05/20/2024 4.37  4.20 - 5.40 M/uL Final    Hemoglobin 05/20/2024 14.0  12.0 - 16.0 g/dL Final    Hematocrit 05/20/2024 42.9  38.0 - 47.0 % Final    MCV 05/20/2024 98.2 (H)  80.0 - 96.0 fL Final    MCH 05/20/2024 32.0 (H)  27.0 - 31.0 pg Final    MCHC 05/20/2024 32.6  32.0 - 36.0 g/dL Final    RDW 05/20/2024 11.9  11.5 - 14.5 % Final    Platelet Count 05/20/2024 348  150 - 400 K/uL Final    MPV 05/20/2024 9.9  9.4 - 12.4 fL Final    Neutrophils % 05/20/2024 58.8  53.0 - 65.0 % Final    Lymphocytes % 05/20/2024 27.7  27.0 - 41.0 % Final     Monocytes % 05/20/2024 9.2 (H)  2.0 - 6.0 % Final    Eosinophils % 05/20/2024 3.6  1.0 - 4.0 % Final    Basophils % 05/20/2024 0.4  0.0 - 1.0 % Final    Immature Granulocytes % 05/20/2024 0.3  0.0 - 0.4 % Final    nRBC, Auto 05/20/2024 0.0  <=0.0 % Final    Neutrophils, Abs 05/20/2024 5.23  1.80 - 7.70 K/uL Final    Lymphocytes, Absolute 05/20/2024 2.47  1.00 - 4.80 K/uL Final    Monocytes, Absolute 05/20/2024 0.82 (H)  0.00 - 0.80 K/uL Final    Eosinophils, Absolute 05/20/2024 0.32  0.00 - 0.50 K/uL Final    Basophils, Absolute 05/20/2024 0.04  0.00 - 0.20 K/uL Final    Immature Granulocytes, Absolute 05/20/2024 0.03  0.00 - 0.04 K/uL Final    nRBC, Absolute 05/20/2024 0.00  <=0.00 x10e3/uL Final    Diff Type 05/20/2024 Auto   Final   Office Visit on 03/21/2024   Component Date Value Ref Range Status    POC Amphetamines 03/21/2024 Negative  Negative, Inconclusive Final    POC Barbiturates 03/21/2024 Negative  Negative, Inconclusive Final    POC Benzodiazepines 03/21/2024 Negative  Negative, Inconclusive Final    POC Cocaine 03/21/2024 Negative  Negative, Inconclusive Final    POC THC 03/21/2024 Negative  Negative, Inconclusive Final    POC Methadone 03/21/2024 Negative  Negative, Inconclusive Final    POC Methamphetamine 03/21/2024 Negative  Negative, Inconclusive Final    POC Opiates 03/21/2024 Presumptive Positive (A)  Negative, Inconclusive Final    POC Oxycodone 03/21/2024 Negative  Negative, Inconclusive Final    POC Phencyclidine 03/21/2024 Negative  Negative, Inconclusive Final    POC Methylenedioxymethamphetamine * 03/21/2024 Negative  Negative, Inconclusive Final    POC Tricyclic Antidepressants 03/21/2024 Negative  Negative, Inconclusive Final    POC Buprenorphine 03/21/2024 Negative   Final     Acceptable 03/21/2024 Yes   Final    POC Temperature (Urine) 03/21/2024 92   Final   Office Visit on 12/14/2023   Component Date Value Ref Range Status    POC Amphetamines 12/14/2023 Negative   Negative, Inconclusive Final    POC Barbiturates 12/14/2023 Negative  Negative, Inconclusive Final    POC Benzodiazepines 12/14/2023 Negative  Negative, Inconclusive Final    POC Cocaine 12/14/2023 Negative  Negative, Inconclusive Final    POC THC 12/14/2023 Negative  Negative, Inconclusive Final    POC Methadone 12/14/2023 Negative  Negative, Inconclusive Final    POC Methamphetamine 12/14/2023 Negative  Negative, Inconclusive Final    POC Opiates 12/14/2023 Negative  Negative, Inconclusive Final    POC Oxycodone 12/14/2023 Negative  Negative, Inconclusive Final    POC Phencyclidine 12/14/2023 Negative  Negative, Inconclusive Final    POC Methylenedioxymethamphetamine * 12/14/2023 Negative  Negative, Inconclusive Final    POC Tricyclic Antidepressants 12/14/2023 Negative  Negative, Inconclusive Final    POC Buprenorphine 12/14/2023 Negative   Final     Acceptable 12/14/2023 Yes   Final    POC Temperature (Urine) 12/14/2023 92   Final         No orders of the defined types were placed in this encounter.          Requested Prescriptions     Signed Prescriptions Disp Refills    baclofen (LIORESAL) 10 MG tablet 90 tablet 1     Sig: Take 1 tablet (10 mg total) by mouth 3 (three) times daily.    HYDROcodone-acetaminophen (NORCO)  mg per tablet 90 tablet 0     Sig: Take 1 tablet by mouth every 8 (eight) hours.    HYDROcodone-acetaminophen (NORCO)  mg per tablet 90 tablet 0     Sig: Take 1 tablet by mouth every 8 (eight) hours.    tiZANidine 4 mg Cap 60 capsule 1     Sig: Take 1 capsule by mouth every 12 (twelve) hours.       Assessment:     1. Lumbosacral spondylosis without myelopathy    2. Cervical radiculopathy    3. Multiple sclerosis           A's of Opioid Risk Assessment  Activity:Patient can perform ADL.   Analgesia:Patients pain is partially controlled by current medication. Patient has tried OTC medications such as Tylenol and Ibuprofen with out relief.   Adverse Effects: Patient  denies constipation or sedation.  Aberrant Behavior:  reviewed with no aberrant drug seeking/taking behavior.  Overdose reversal drug naloxone discussed    Drug screen reviewed        X-ray lumbar spine [facility] May 2021 multiple level degenerative changes no fracture noted    MRI [facility] lumbar spine degenerative changes multiple levels will place report under media    X-ray right knee [facility] March 12, 2024, mild tricompartmental degenerative changes right knee, no fracture noted        Plan:      Narcan January 2023      Follows Neurology multiple sclerosis [facility]     Follows orthopedics [facility]   Upcoming knee surgery June 13, 2024      She states she is doing well current medication is helping control her chronic joint pain neck and back pain    Would like to continue with conservative management    Continue current medication    Continue home exercise program as directed    Follow-up 2 months     Dr. Gaston, January 2024    Bring original prescription medication bottles/container/box with labels to each visit

## 2024-05-17 ENCOUNTER — HOSPITAL ENCOUNTER (OUTPATIENT)
Dept: RADIOLOGY | Facility: HOSPITAL | Age: 44
Discharge: HOME | End: 2024-05-17
Payer: COMMERCIAL

## 2024-05-17 DIAGNOSIS — M25.561 ACUTE PAIN OF RIGHT KNEE: ICD-10-CM

## 2024-05-17 PROCEDURE — 73721 MRI JNT OF LWR EXTRE W/O DYE: CPT | Mod: 26,RT,, | Performed by: RADIOLOGY

## 2024-05-17 PROCEDURE — 73721 MRI JNT OF LWR EXTRE W/O DYE: CPT | Mod: TC,RT

## 2024-05-20 ENCOUNTER — CLINICAL SUPPORT (OUTPATIENT)
Dept: CARDIOLOGY | Facility: CLINIC | Age: 44
End: 2024-05-20
Payer: COMMERCIAL

## 2024-05-20 ENCOUNTER — OFFICE VISIT (OUTPATIENT)
Dept: ORTHOPEDICS | Facility: CLINIC | Age: 44
End: 2024-05-20
Payer: COMMERCIAL

## 2024-05-20 DIAGNOSIS — Z01.818 PRE-PROCEDURAL EXAMINATION: ICD-10-CM

## 2024-05-20 DIAGNOSIS — S83.231A COMPLEX TEAR OF MEDIAL MENISCUS OF RIGHT KNEE AS CURRENT INJURY, INITIAL ENCOUNTER: Primary | ICD-10-CM

## 2024-05-20 PROCEDURE — 1159F MED LIST DOCD IN RCRD: CPT | Mod: CPTII,,, | Performed by: ORTHOPAEDIC SURGERY

## 2024-05-20 PROCEDURE — 99212 OFFICE O/P EST SF 10 MIN: CPT | Mod: PBBFAC

## 2024-05-20 PROCEDURE — 93010 ELECTROCARDIOGRAM REPORT: CPT | Mod: S$PBB,,, | Performed by: INTERNAL MEDICINE

## 2024-05-20 PROCEDURE — 93005 ELECTROCARDIOGRAM TRACING: CPT | Mod: PBBFAC | Performed by: INTERNAL MEDICINE

## 2024-05-20 PROCEDURE — 1160F RVW MEDS BY RX/DR IN RCRD: CPT | Mod: CPTII,,, | Performed by: ORTHOPAEDIC SURGERY

## 2024-05-20 PROCEDURE — 99213 OFFICE O/P EST LOW 20 MIN: CPT | Mod: PBBFAC | Performed by: ORTHOPAEDIC SURGERY

## 2024-05-20 PROCEDURE — 99214 OFFICE O/P EST MOD 30 MIN: CPT | Mod: S$PBB,,, | Performed by: ORTHOPAEDIC SURGERY

## 2024-05-20 NOTE — PATIENT INSTRUCTIONS
Your surgery is scheduled for Thursday, June 13 at [facility] in [place].    Labwork (1st floor clinic) ___x___  EKG      (2nd floor clinic)___x___      Our office will contact you the day before surgery with your arrival time.  Do not eat or drink anything after midnight the night before surgery (this includes gum, candy, chewing tobacco, etc).  Bring all medication in their original bottles.  Bathe with Hibiclens the night or morning before your surgery.  The morning of your surgery ONLY take blood pressure, heart, acid reflux, or thyroid (if you take a morning dose) medication.  Take these medications with a sip of water.   Be sure to have stopped your blood thinner medication at the appropriate time, as instructed.  Bring your C-Pap machine if you have one.  All jewelry, piercings, or false eyelashes MUST be removed prior to surgery.

## 2024-05-20 NOTE — PROGRESS NOTES
CLINIC NOTE       Chief Complaint   Patient presents with    Results     MRI     HPI     Results     Additional comments: MRI          Last edited by Glendy Zamorano MA on 5/20/2024 10:43 AM.       Pili Love is a 43 y.o. female seen today for evaluation of right knee pain.  She reports having had a right knee arthroscopy during teenage years as result of a dance injury.  She was ostensibly recovered but stepped in a gopher hole 6 weeks ago while running down the heel.  She would subsequent medial knee pain.  She has had a sense intermittent catching and giving way associated with pain.  She was used over-the-counter anti-inflammatory medications and completed a course of physical therapy without improvement.  Indicates pain localized to the medial aspect of the knee.    X-rays of the right knee 03/12/2024 shows the bones well mineralized.  Joint spaces are generally well maintained.  He was he was a hint of tiny medial joint osteophyte formation.  No evidence of fracture, dislocation or pathologic bone.    MRI examination right knee 05/17/2024 suggests complex tear of the posterior horn of the medial meniscus.  There was evidence of mild medial compartment osteoarthrosis  Past Medical History:   Diagnosis Date    Acute bronchitis 3/24/2021    Anxiety     Asthma     Chronic otitis media of both ears 2/17/2023    Depression     Endometriosis, unspecified     History of migraine 8/30/2023    History of multiple sclerosis 8/30/2023    Lumbosacral spondylosis without myelopathy 7/27/2022    Migraine     Multiple sclerosis     Dr Antonio     Family History   Problem Relation Name Age of Onset    Hypertension Paternal Grandfather      Heart attack Paternal Grandfather      Cancer Maternal Grandfather      Hypertension Father      Asthma Father       Current Outpatient Medications on File Prior to Visit   Medication Sig Dispense Refill    albuterol (VENTOLIN HFA) 90 mcg/actuation inhaler Inhale 2 puffs into the  lungs every 6 (six) hours as needed for Wheezing. Rescue 18 g 0    ARIPiprazole (ABILIFY) 20 MG Tab Take by mouth.      baclofen (LIORESAL) 10 MG tablet Take 1 tablet (10 mg total) by mouth 3 (three) times daily. 90 tablet 1    buPROPion (WELLBUTRIN XL) 150 MG TB24 tablet Take 150 mg by mouth every morning.      diclofenac sodium (VOLTAREN ARTHRITIS PAIN) 1 % Gel Apply 2 g topically 2 (two) times daily. Apply 2 grams BID to knees, elbows, hips joints bilaterally as needed 10 each 2    EPINEPHrine (EPIPEN) 0.3 mg/0.3 mL AtIn Inject 0.3 mLs (0.3 mg total) into the muscle once. for 1 dose 0.3 mL 0    EScitalopram oxalate (LEXAPRO) 20 MG tablet Take 20 mg by mouth once daily.      gabapentin (NEURONTIN) 300 MG capsule Take 2 capsules (600 mg total) by mouth 3 (three) times daily. 270 capsule 3    HYDROcodone-acetaminophen (NORCO)  mg per tablet Take 1 tablet by mouth every 8 (eight) hours. 90 tablet 0    LIDOcaine (LIDODERM) 5 % Place 1 patch onto the skin once daily. Remove & Discard patch within 12 hours or as directed by MD 30 patch 2    nortriptyline (PAMELOR) 75 MG Cap Take 1 capsule (75 mg total) by mouth every evening. 90 capsule 3    terconazole (TERAZOL 7) 0.4 % Crea Place 1 applicator vaginally every evening. 7 g 0    tiZANidine 4 mg Cap Take 1 capsule by mouth every 12 (twelve) hours. 60 capsule 1     No current facility-administered medications on file prior to visit.       ROS     There were no vitals filed for this visit.    Past Surgical History:   Procedure Laterality Date     bilateral cervical paraspinous muscle trigger point injection Bilateral [id number]    D Shows  x 4     SECTION      FOOT SURGERY Left     left toe fusion    HYSTERECTOMY      INJECTION OF ANESTHETIC AGENT AROUND MEDIAL BRANCH NERVES INNERVATING LUMBAR FACET JOINT Bilateral 2022    Procedure: BLOCK, NERVE, FACET JOINT, LUMBAR, MEDIAL BRANCH;  Surgeon: Twyla Gaston MD;  Location: [facility];  Service:  Pain Management;  Laterality: Bilateral;    INJECTION OF ANESTHETIC AGENT AROUND MEDIAL BRANCH NERVES INNERVATING LUMBAR FACET JOINT Bilateral 11/10/2022    Procedure: Block-nerve-medial branch-lumbar, bilateral L4 through S1;  Surgeon: Twyla Gaston MD;  Location: [facility];  Service: Pain Management;  Laterality: Bilateral;  patient will have to be tested    KNEE CARTILAGE SURGERY Right     LUMBAR PUNCTURE  2016    Dr Trejo    MYRINGOTOMY WITH INSERTION OF VENTILATION TUBE Bilateral 2/17/2023    Procedure: MYRINGOTOMY, WITH TYMPANOSTOMY TUBE INSERTION;  Surgeon: Gagan Francis MD;  Location: [facility];  Service: ENT;  Laterality: Bilateral;    RADIOFREQUENCY ABLATION OF LUMBAR MEDIAL BRANCH NERVE AT SINGLE LEVEL Right 12/15/2022    Procedure: Radiofrequency Ablation, Nerve, Spinal, Lumbar, Medial Branch, Level L4-S1;  Surgeon: Twyla Gaston MD;  Location: [facility];  Service: Pain Management;  Laterality: Right;  cong left after right side is done    RADIOFREQUENCY ABLATION OF LUMBAR MEDIAL BRANCH NERVE AT SINGLE LEVEL Left 1/5/2023    Procedure: RADIOFREQUENCY ABLATION, NERVE, SPINAL, LUMBAR, MEDIAL BRANCH, 1 LEVEL;  Surgeon: Twyla Gaston MD;  Location: [facility];  Service: Pain Management;  Laterality: Left;  Left L4-S1 RFTC. Pt had right on 12/15    Right C3-C7 FI Right 04/17/2018    Dr Trejo    TUBAL LIGATION      VAGINAL DELIVERY          Review of patient's allergies indicates:   Allergen Reactions    Cockroach     Grass pollen-bermuda, standard     Grass pollen-veronica, standard     Latex, natural rubber     Peanuts [peanut (legumes)] Other (See Comments)     Allergy testing    Penicillins     Wheat containing prod Other (See Comments)     Allergy testing        Ortho Exam :  Well-developed well-nourished  female no acute distress.  She is alert oriented cooperative.  Neck is supple without JVD.  Breathing is regular nonlabored.  Skin is warm dry no  lesions seen.  Exam of the right knee shows no sign of intra-articular effusion.  Knee motion is 0-130 degrees of flexion.  There is tenderness palpation over the medial joint line region.  Lachman exam is negative as is the flexion rotation drawer test.  No medial or lateral ligamentous instability appreciated.  Court test produces pain over the medial aspect of the joint without dana popping.    Radiographic Examination:    Technique:    Findings:    Impression:   See Above    Assessment and Plan  Patient Active Problem List    Diagnosis Date Noted    Chronic pain of right knee 10/17/2023    Depressive disorder 08/30/2023    Chronic pain disorder 08/30/2023    Asthma 08/30/2023    Anxiety 08/30/2023    Trochanteric bursitis of right hip 06/19/2023    Polyarthralgia 01/30/2023    Neck pain 08/17/2021    Myalgia 08/17/2021    Multiple sclerosis 05/24/2021    Cervical radiculopathy 05/24/2021    Migraine     Lumbosacral radiculopathy     Chronic migraine without aura with status migrainosus, not intractable 06/21/2017    Impression:  Acute on chronic complex degenerative medial meniscal tear-right knee   Plan: Patient is offered right knee arthroscopy.  Potential benefits risks surgery outlined to include but not limited to bleeding, infection, damage to blood vessels and nerves, need for further surgery, other risks and complications including even death the patient wished to proceed.  Limitations of arthroscopy relative to DJD were emphasized.      Reinaldo Palmer M.D.

## 2024-05-21 ENCOUNTER — OFFICE VISIT (OUTPATIENT)
Dept: PAIN MEDICINE | Facility: CLINIC | Age: 44
End: 2024-05-21
Payer: COMMERCIAL

## 2024-05-21 VITALS
WEIGHT: 143 LBS | SYSTOLIC BLOOD PRESSURE: 124 MMHG | RESPIRATION RATE: 18 BRPM | HEIGHT: 63 IN | HEART RATE: 95 BPM | BODY MASS INDEX: 25.34 KG/M2 | DIASTOLIC BLOOD PRESSURE: 85 MMHG

## 2024-05-21 DIAGNOSIS — M54.12 CERVICAL RADICULOPATHY: Chronic | ICD-10-CM

## 2024-05-21 DIAGNOSIS — M47.817 LUMBOSACRAL SPONDYLOSIS WITHOUT MYELOPATHY: Primary | Chronic | ICD-10-CM

## 2024-05-21 DIAGNOSIS — G35 MULTIPLE SCLEROSIS: Chronic | ICD-10-CM

## 2024-05-21 PROCEDURE — 3079F DIAST BP 80-89 MM HG: CPT | Mod: CPTII,,, | Performed by: PHYSICIAN ASSISTANT

## 2024-05-21 PROCEDURE — 99214 OFFICE O/P EST MOD 30 MIN: CPT | Mod: S$PBB,,, | Performed by: PHYSICIAN ASSISTANT

## 2024-05-21 PROCEDURE — 1159F MED LIST DOCD IN RCRD: CPT | Mod: CPTII,,, | Performed by: PHYSICIAN ASSISTANT

## 2024-05-21 PROCEDURE — 3074F SYST BP LT 130 MM HG: CPT | Mod: CPTII,,, | Performed by: PHYSICIAN ASSISTANT

## 2024-05-21 PROCEDURE — 99214 OFFICE O/P EST MOD 30 MIN: CPT | Mod: PBBFAC | Performed by: PHYSICIAN ASSISTANT

## 2024-05-21 PROCEDURE — 3008F BODY MASS INDEX DOCD: CPT | Mod: CPTII,,, | Performed by: PHYSICIAN ASSISTANT

## 2024-05-21 RX ORDER — BACLOFEN 10 MG/1
10 TABLET ORAL 3 TIMES DAILY
Qty: 90 TABLET | Refills: 1 | Status: SHIPPED | OUTPATIENT
Start: 2024-05-21

## 2024-05-21 RX ORDER — HYDROCODONE BITARTRATE AND ACETAMINOPHEN 10; 325 MG/1; MG/1
1 TABLET ORAL EVERY 8 HOURS
Qty: 90 TABLET | Refills: 0 | Status: SHIPPED | OUTPATIENT
Start: 2024-06-30 | End: 2024-07-30

## 2024-05-21 RX ORDER — HYDROCODONE BITARTRATE AND ACETAMINOPHEN 10; 325 MG/1; MG/1
1 TABLET ORAL EVERY 8 HOURS
Qty: 90 TABLET | Refills: 0 | Status: SHIPPED | OUTPATIENT
Start: 2024-05-31 | End: 2024-06-12 | Stop reason: SDUPTHER

## 2024-05-21 RX ORDER — TIZANIDINE HYDROCHLORIDE 4 MG/1
1 CAPSULE, GELATIN COATED ORAL EVERY 12 HOURS
Qty: 60 CAPSULE | Refills: 1 | Status: SHIPPED | OUTPATIENT
Start: 2024-05-21

## 2024-05-25 LAB
OHS QRS DURATION: 82 MS
OHS QTC CALCULATION: 442 MS

## 2024-05-28 ENCOUNTER — TELEPHONE (OUTPATIENT)
Dept: ORTHOPEDICS | Facility: CLINIC | Age: 44
End: 2024-05-28
Payer: COMMERCIAL

## 2024-05-28 NOTE — TELEPHONE ENCOUNTER
----- Message from Vivi Abel sent at 5/28/2024  2:47 PM CDT -----  Who Called: Roxy Corraleser    Patient NEED FOR YOU TO GIVE HER A call    Who Left Message for Patient:ROXY ROMERO  Does the patient know what this is regarding?:MOVE UP HER SURGERY DAY      Preferred Method of Contact: Phone Call  Patient's Preferred Phone Number on File: [phone]   Best Call Back Number, if different:  Additional Information: WANT HER SURGERY DAY MOVE UP IF ALL POSSIBLE

## 2024-05-31 ENCOUNTER — TELEPHONE (OUTPATIENT)
Dept: ORTHOPEDICS | Facility: CLINIC | Age: 44
End: 2024-05-31
Payer: COMMERCIAL

## 2024-05-31 NOTE — TELEPHONE ENCOUNTER
----- Message from Evita Salguero sent at 5/31/2024  2:37 PM CDT -----  Who Called: Pili Love    Caller is requesting assistance/information from provider's office.    Symptoms (please be specific): Pt said she need to change surgery back to 6/13 from 6/11.  Preferred Method of Contact: Phone Call  Patient's Preferred Phone Number on File: [phone]   Best Call Back Number, if different:  Additional Information:

## 2024-06-11 ENCOUNTER — OFFICE VISIT (OUTPATIENT)
Dept: OBSTETRICS AND GYNECOLOGY | Facility: CLINIC | Age: 44
End: 2024-06-11
Payer: COMMERCIAL

## 2024-06-11 VITALS
BODY MASS INDEX: 24.45 KG/M2 | DIASTOLIC BLOOD PRESSURE: 94 MMHG | RESPIRATION RATE: 17 BRPM | WEIGHT: 138 LBS | OXYGEN SATURATION: 99 % | HEART RATE: 103 BPM | SYSTOLIC BLOOD PRESSURE: 146 MMHG | HEIGHT: 63 IN

## 2024-06-11 DIAGNOSIS — R39.15 URGENCY OF URINATION: ICD-10-CM

## 2024-06-11 DIAGNOSIS — K59.09 OTHER CONSTIPATION: ICD-10-CM

## 2024-06-11 DIAGNOSIS — N39.46 MIXED INCONTINENCE URGE AND STRESS: ICD-10-CM

## 2024-06-11 DIAGNOSIS — Z12.31 ENCOUNTER FOR SCREENING MAMMOGRAM FOR MALIGNANT NEOPLASM OF BREAST: ICD-10-CM

## 2024-06-11 DIAGNOSIS — K62.3 RECTAL PROLAPSE: ICD-10-CM

## 2024-06-11 DIAGNOSIS — R23.2 HOT FLASHES: ICD-10-CM

## 2024-06-11 DIAGNOSIS — Z72.51 HIGH RISK HETEROSEXUAL BEHAVIOR: ICD-10-CM

## 2024-06-11 DIAGNOSIS — Z11.3 ENCOUNTER FOR SCREENING FOR INFECTIONS WITH PREDOMINANTLY SEXUAL MODE OF TRANSMISSION: ICD-10-CM

## 2024-06-11 DIAGNOSIS — Z01.411 ENCOUNTER FOR GYNECOLOGICAL EXAMINATION WITH ABNORMAL FINDING: Primary | ICD-10-CM

## 2024-06-11 LAB
BILIRUB SERPL-MCNC: NEGATIVE MG/DL
BLOOD URINE, POC: ABNORMAL
CANDIDA SPECIES: NEGATIVE
COLOR, POC UA: YELLOW
FSH SERPL-ACNC: 124.5 MIU/ML (ref 1.7–134.8)
GARDNERELLA: POSITIVE
GLUCOSE UR QL STRIP: NEGATIVE
KETONES UR QL STRIP: NEGATIVE
LEUKOCYTE ESTERASE URINE, POC: NEGATIVE
NITRITE, POC UA: NEGATIVE
PH, POC UA: 5.5
PROTEIN, POC: NEGATIVE
SPECIFIC GRAVITY, POC UA: 1.01
TRICHOMONAS: NEGATIVE
UROBILINOGEN, POC UA: 0.2

## 2024-06-11 PROCEDURE — 83001 ASSAY OF GONADOTROPIN (FSH): CPT | Mod: ,,, | Performed by: CLINICAL MEDICAL LABORATORY

## 2024-06-11 PROCEDURE — 3077F SYST BP >= 140 MM HG: CPT | Mod: CPTII,,, | Performed by: ADVANCED PRACTICE MIDWIFE

## 2024-06-11 PROCEDURE — 99396 PREV VISIT EST AGE 40-64: CPT | Mod: ,,, | Performed by: ADVANCED PRACTICE MIDWIFE

## 2024-06-11 PROCEDURE — 87660 TRICHOMONAS VAGIN DIR PROBE: CPT | Mod: ,,, | Performed by: CLINICAL MEDICAL LABORATORY

## 2024-06-11 PROCEDURE — 3008F BODY MASS INDEX DOCD: CPT | Mod: CPTII,,, | Performed by: ADVANCED PRACTICE MIDWIFE

## 2024-06-11 PROCEDURE — 87510 GARDNER VAG DNA DIR PROBE: CPT | Mod: ,,, | Performed by: CLINICAL MEDICAL LABORATORY

## 2024-06-11 PROCEDURE — 81003 URINALYSIS AUTO W/O SCOPE: CPT | Mod: QW,,, | Performed by: ADVANCED PRACTICE MIDWIFE

## 2024-06-11 PROCEDURE — 87480 CANDIDA DNA DIR PROBE: CPT | Mod: ,,, | Performed by: CLINICAL MEDICAL LABORATORY

## 2024-06-11 PROCEDURE — 87591 N.GONORRHOEAE DNA AMP PROB: CPT | Mod: ,,, | Performed by: CLINICAL MEDICAL LABORATORY

## 2024-06-11 PROCEDURE — 3080F DIAST BP >= 90 MM HG: CPT | Mod: CPTII,,, | Performed by: ADVANCED PRACTICE MIDWIFE

## 2024-06-11 PROCEDURE — 87491 CHLMYD TRACH DNA AMP PROBE: CPT | Mod: ,,, | Performed by: CLINICAL MEDICAL LABORATORY

## 2024-06-11 PROCEDURE — 1159F MED LIST DOCD IN RCRD: CPT | Mod: CPTII,,, | Performed by: ADVANCED PRACTICE MIDWIFE

## 2024-06-11 RX ORDER — RIZATRIPTAN BENZOATE 10 MG/1
TABLET, ORALLY DISINTEGRATING ORAL
COMMUNITY
Start: 2024-06-10

## 2024-06-11 RX ORDER — OXYBUTYNIN CHLORIDE 5 MG/1
TABLET, EXTENDED RELEASE ORAL
COMMUNITY
Start: 2024-04-11

## 2024-06-11 RX ORDER — BACLOFEN 10 MG/1
TABLET ORAL
COMMUNITY
Start: 2024-06-10 | End: 2024-06-12 | Stop reason: SDUPTHER

## 2024-06-11 RX ORDER — BUPROPION HYDROCHLORIDE 200 MG/1
TABLET, EXTENDED RELEASE ORAL
COMMUNITY
Start: 2024-05-10

## 2024-06-12 ENCOUNTER — TELEPHONE (OUTPATIENT)
Dept: OBSTETRICS AND GYNECOLOGY | Facility: CLINIC | Age: 44
End: 2024-06-12
Payer: COMMERCIAL

## 2024-06-12 DIAGNOSIS — N76.0 BACTERIAL VAGINOSIS: Primary | ICD-10-CM

## 2024-06-12 DIAGNOSIS — B96.89 BACTERIAL VAGINOSIS: Primary | ICD-10-CM

## 2024-06-12 LAB
CHLAMYDIA BY PCR: NEGATIVE
N. GONORRHOEAE (GC) BY PCR: NEGATIVE

## 2024-06-12 RX ORDER — METRONIDAZOLE 500 MG/1
500 TABLET ORAL 2 TIMES DAILY
Qty: 14 TABLET | Refills: 0 | Status: SHIPPED | OUTPATIENT
Start: 2024-06-12 | End: 2024-06-19

## 2024-06-12 NOTE — PROGRESS NOTES
Patient ID:  Pili Love is a 43 y.o. female      Chief Complaint:   Chief Complaint   Patient presents with    Annual Exam    STD CHECK     Patient is here for annual and std testing. Also wants to talk about uterus prolapse        HPI:  Pili wants a gyn exam.  She is reports that her rectum prolapse when she is having a bowel movement an she has to push it back in.  She has problems with constipation.  She has had a hysterectomy.  She reports a family history of collagenous colitis. She has MS and has weakness and nerve pain.  LMP: No LMP recorded. Patient has had a hysterectomy.    Sexually active:  yes  Contraceptive:BTL      Past Medical History:   Diagnosis Date    Acute bronchitis 3/24/2021    Anxiety     Asthma     Chronic otitis media of both ears 2023    Depression     Endometriosis, unspecified     History of migraine 2023    History of multiple sclerosis 2023    Lumbosacral spondylosis without myelopathy 2022    Migraine     Multiple sclerosis     Dr Antonio     Past Surgical History:   Procedure Laterality Date     bilateral cervical paraspinous muscle trigger point injection Bilateral [id number]    D Shows  x 4     SECTION      FOOT SURGERY Left     left toe fusion    HYSTERECTOMY      INJECTION OF ANESTHETIC AGENT AROUND MEDIAL BRANCH NERVES INNERVATING LUMBAR FACET JOINT Bilateral 2022    Procedure: BLOCK, NERVE, FACET JOINT, LUMBAR, MEDIAL BRANCH;  Surgeon: Twyla Gaston MD;  Location: [facility];  Service: Pain Management;  Laterality: Bilateral;    INJECTION OF ANESTHETIC AGENT AROUND MEDIAL BRANCH NERVES INNERVATING LUMBAR FACET JOINT Bilateral 11/10/2022    Procedure: Block-nerve-medial branch-lumbar, bilateral L4 through S1;  Surgeon: Twyla Gaston MD;  Location: [facility];  Service: Pain Management;  Laterality: Bilateral;  patient will have to be tested    KNEE CARTILAGE SURGERY Right     LUMBAR PUNCTURE      Dr Trejo     "MYRINGOTOMY WITH INSERTION OF VENTILATION TUBE Bilateral 2023    Procedure: MYRINGOTOMY, WITH TYMPANOSTOMY TUBE INSERTION;  Surgeon: Gagan Francis MD;  Location: [facility];  Service: ENT;  Laterality: Bilateral;    RADIOFREQUENCY ABLATION OF LUMBAR MEDIAL BRANCH NERVE AT SINGLE LEVEL Right 12/15/2022    Procedure: Radiofrequency Ablation, Nerve, Spinal, Lumbar, Medial Branch, Level L4-S1;  Surgeon: Twyla Gaston MD;  Location: [facility];  Service: Pain Management;  Laterality: Right;  cong left after right side is done    RADIOFREQUENCY ABLATION OF LUMBAR MEDIAL BRANCH NERVE AT SINGLE LEVEL Left 2023    Procedure: RADIOFREQUENCY ABLATION, NERVE, SPINAL, LUMBAR, MEDIAL BRANCH, 1 LEVEL;  Surgeon: Twyla Gaston MD;  Location: [facility];  Service: Pain Management;  Laterality: Left;  Left L4-S1 RFTC. Pt had right on 12/15    Right C3-C7 FI Right 2018    Dr Trejo    TUBAL LIGATION      VAGINAL DELIVERY         OB History          7    Para   6    Term                AB   1    Living   6         SAB        IAB        Ectopic        Multiple        Live Births   6                 BP (!) 146/94 (BP Location: Right arm, Patient Position: Sitting)   Pulse 103   Resp 17   Ht 5' 3" (1.6 m)   Wt 62.6 kg (138 lb)   SpO2 99%   BMI 24.45 kg/m²   Wt Readings from Last 3 Encounters:   24 62.6 kg (138 lb)   24 64.9 kg (143 lb)   24 63.5 kg (140 lb)          ROS:  Review of Systems   Constitutional:  Positive for fatigue.   HENT: Negative.     Eyes: Negative.    Respiratory: Negative.     Cardiovascular: Negative.    Gastrointestinal:  Positive for constipation.   Genitourinary:  Positive for vaginal discharge. Negative for bladder incontinence, pelvic pain and postmenopausal bleeding.   Musculoskeletal:  Positive for back pain, leg pain and myalgias.   Integumentary:  Negative for breast mass and breast tenderness.   Neurological: Negative.  "   Psychiatric/Behavioral: Negative.     Breast: negative.  Negative for mass and tenderness         PHYSICAL EXAM:  Physical Exam  Vitals and nursing note reviewed.   Constitutional:       General: She is not in acute distress.     Appearance: Normal appearance. She is normal weight. She is not ill-appearing.   HENT:      Head: Normocephalic.      Nose: Nose normal.      Mouth/Throat:      Mouth: Mucous membranes are moist.   Eyes:      Extraocular Movements: Extraocular movements intact.   Cardiovascular:      Rate and Rhythm: Normal rate and regular rhythm.      Heart sounds: Normal heart sounds.   Pulmonary:      Effort: Pulmonary effort is normal.      Breath sounds: Normal breath sounds.   Chest:      Chest wall: No mass, lacerations or deformity.   Breasts:     Right: Normal. No swelling, bleeding, inverted nipple, mass or nipple discharge.      Left: Normal. No swelling, bleeding, inverted nipple, mass or nipple discharge.   Abdominal:      General: Abdomen is flat. There is no distension.      Palpations: Abdomen is soft. There is no mass.      Tenderness: There is no abdominal tenderness. There is no guarding.      Hernia: There is no hernia in the left inguinal area or right inguinal area.   Genitourinary:     General: Normal vulva.      Exam position: Lithotomy position.      Labia:         Right: No rash or tenderness.         Left: No rash or tenderness.       Urethra: No prolapse.      Vagina: Vaginal discharge present.      Uterus: Absent.       Adnexa: Right adnexa normal and left adnexa normal.        Right: No mass or tenderness.          Left: No mass or tenderness.        Rectum: Normal. No external hemorrhoid.      Comments: No rectal prolapse seen  Musculoskeletal:         General: Normal range of motion.      Cervical back: Neck supple.   Skin:     General: Skin is warm and dry.   Neurological:      General: No focal deficit present.      Mental Status: She is alert and oriented to person,  place, and time.   Psychiatric:         Mood and Affect: Mood normal.         Behavior: Behavior normal.         Thought Content: Thought content normal.        Assessment:  Pili was seen today for annual exam and std check.    Diagnoses and all orders for this visit:    Encounter for gynecological examination with abnormal finding    Other constipation    Encounter for screening mammogram for malignant neoplasm of breast  -     Mammo Digital Screening Bilat; Future    Urgency of urination  -     POCT URINALYSIS W/O SCOPE    Mixed incontinence urge and stress  -     Ambulatory referral/consult to Gynecology; Future    Rectal prolapse  -     Ambulatory referral/consult to Gynecology; Future    Hot flashes  -     Follicle Stimulating Hormone; Future  -     Follicle Stimulating Hormone    Encounter for screening for infections with predominantly sexual mode of transmission  -     Chlamydia/GC, PCR; Future  -     Bacterial Vaginosis  -     Chlamydia/GC, PCR    High risk heterosexual behavior  -     Chlamydia/GC, PCR; Future  -     Bacterial Vaginosis  -     Chlamydia/GC, PCR          ICD-10-CM ICD-9-CM    1. Encounter for gynecological examination with abnormal finding  Z01.411 V72.31       2. Other constipation  K59.09 564.09       3. Encounter for screening mammogram for malignant neoplasm of breast  Z12.31 V76.12 Mammo Digital Screening Bilat      4. Urgency of urination  R39.15 788.63 POCT URINALYSIS W/O SCOPE      5. Mixed incontinence urge and stress  N39.46 788.33 Ambulatory referral/consult to Gynecology      6. Rectal prolapse  K62.3 569.1 Ambulatory referral/consult to Gynecology      7. Hot flashes  R23.2 782.62 Follicle Stimulating Hormone      Follicle Stimulating Hormone      8. Encounter for screening for infections with predominantly sexual mode of transmission  Z11.3 V74.5 Chlamydia/GC, PCR      Bacterial Vaginosis      Chlamydia/GC, PCR      9. High risk heterosexual behavior  Z72.51 V69.2  Chlamydia/GC, PCR      Bacterial Vaginosis      Chlamydia/GC, PCR          Plan:      No follow-ups on file.         28.2

## 2024-06-12 NOTE — TELEPHONE ENCOUNTER
----- Message from Yanet Wang CNM sent at 6/12/2024  8:53 AM CDT -----  Review with pt.as MAXIM and I sent Flagyl.  Her FSH level is at the menopausal level.  We can consider estrogen if the hot flashes or night sweats are excessive.  She has an appt with Dr. Carter about her rectal prolase.

## 2024-06-12 NOTE — TELEPHONE ENCOUNTER
Spoke with patient and let her know Dr.Purdy davalos seeing new patiints and his surgery date go out to late october.

## 2024-06-12 NOTE — TELEPHONE ENCOUNTER
Called [facility] and let nurse know to let YOLANDA know  isn't seeing new patients and she may have to refer her somewhere else and his surgery date is in October.

## 2024-06-12 NOTE — TELEPHONE ENCOUNTER
----- Message from Gissel Carpio sent at 6/11/2024  4:44 PM CDT -----  Who Called: Pili Love    Caller is requesting a sooner appointment. Caller declined first available appointment listed below. Caller will not accept being placed on the waitlist and is requesting a message be sent to doctor.    When is the first available appointment? Pt is scheduled for 8/27    Symptoms: Rectal prolapse      Preferred Method of Contact: Phone Call  Patient's Preferred Phone Number on File: [phone]   Best Call Back Number, if different:  Additional Information:  wanting to see if she can be worked in sooner

## 2024-06-13 ENCOUNTER — HOSPITAL ENCOUNTER (OUTPATIENT)
Facility: HOSPITAL | Age: 44
Discharge: HOME | End: 2024-06-13
Attending: ORTHOPAEDIC SURGERY | Admitting: ORTHOPAEDIC SURGERY
Payer: COMMERCIAL

## 2024-06-13 ENCOUNTER — ANESTHESIA EVENT (OUTPATIENT)
Dept: SURGERY | Facility: HOSPITAL | Age: 44
End: 2024-06-13
Payer: COMMERCIAL

## 2024-06-13 ENCOUNTER — ANESTHESIA (OUTPATIENT)
Dept: SURGERY | Facility: HOSPITAL | Age: 44
End: 2024-06-13
Payer: COMMERCIAL

## 2024-06-13 VITALS
HEIGHT: 63 IN | WEIGHT: 146 LBS | HEART RATE: 96 BPM | RESPIRATION RATE: 16 BRPM | TEMPERATURE: 99 F | OXYGEN SATURATION: 96 % | BODY MASS INDEX: 25.87 KG/M2 | DIASTOLIC BLOOD PRESSURE: 85 MMHG | SYSTOLIC BLOOD PRESSURE: 125 MMHG

## 2024-06-13 DIAGNOSIS — S83.241A ACUTE MEDIAL MENISCUS TEAR OF RIGHT KNEE: Primary | ICD-10-CM

## 2024-06-13 PROCEDURE — 27000655: Performed by: NURSE ANESTHETIST, CERTIFIED REGISTERED

## 2024-06-13 PROCEDURE — 37000009 HC ANESTHESIA EA ADD 15 MINS: Performed by: ORTHOPAEDIC SURGERY

## 2024-06-13 PROCEDURE — 97161 PT EVAL LOW COMPLEX 20 MIN: CPT

## 2024-06-13 PROCEDURE — 29880 ARTHRS KNE SRG MNISECTMY M&L: CPT | Mod: RT,,, | Performed by: ORTHOPAEDIC SURGERY

## 2024-06-13 PROCEDURE — 36000710: Performed by: ORTHOPAEDIC SURGERY

## 2024-06-13 PROCEDURE — 25000003 PHARM REV CODE 250: Performed by: ANESTHESIOLOGY

## 2024-06-13 PROCEDURE — 27000510 HC BLANKET BAIR HUGGER ANY SIZE: Performed by: NURSE ANESTHETIST, CERTIFIED REGISTERED

## 2024-06-13 PROCEDURE — 27000177 HC AIRWAY, LARYNGEAL MASK: Performed by: NURSE ANESTHETIST, CERTIFIED REGISTERED

## 2024-06-13 PROCEDURE — 63600175 PHARM REV CODE 636 W HCPCS: Performed by: ANESTHESIOLOGY

## 2024-06-13 PROCEDURE — 36000711: Performed by: ORTHOPAEDIC SURGERY

## 2024-06-13 PROCEDURE — 71000033 HC RECOVERY, INTIAL HOUR: Performed by: ORTHOPAEDIC SURGERY

## 2024-06-13 PROCEDURE — 71000016 HC POSTOP RECOV ADDL HR: Performed by: ORTHOPAEDIC SURGERY

## 2024-06-13 PROCEDURE — 71000015 HC POSTOP RECOV 1ST HR: Performed by: ORTHOPAEDIC SURGERY

## 2024-06-13 PROCEDURE — 25000003 PHARM REV CODE 250: Performed by: ORTHOPAEDIC SURGERY

## 2024-06-13 PROCEDURE — 27201423 OPTIME MED/SURG SUP & DEVICES STERILE SUPPLY: Performed by: ORTHOPAEDIC SURGERY

## 2024-06-13 PROCEDURE — 25000003 PHARM REV CODE 250: Performed by: NURSE ANESTHETIST, CERTIFIED REGISTERED

## 2024-06-13 PROCEDURE — 27000716 HC OXISENSOR PROBE, ANY SIZE: Performed by: NURSE ANESTHETIST, CERTIFIED REGISTERED

## 2024-06-13 PROCEDURE — 63600175 PHARM REV CODE 636 W HCPCS: Performed by: NURSE ANESTHETIST, CERTIFIED REGISTERED

## 2024-06-13 PROCEDURE — 37000008 HC ANESTHESIA 1ST 15 MINUTES: Performed by: ORTHOPAEDIC SURGERY

## 2024-06-13 RX ORDER — CLINDAMYCIN PHOSPHATE 900 MG/50ML
INJECTION, SOLUTION INTRAVENOUS
Status: DISCONTINUED | OUTPATIENT
Start: 2024-06-13 | End: 2024-06-13

## 2024-06-13 RX ORDER — SODIUM CHLORIDE 9 MG/ML
INJECTION, SOLUTION INTRAVENOUS CONTINUOUS
Status: DISCONTINUED | OUTPATIENT
Start: 2024-06-13 | End: 2024-06-13 | Stop reason: HOSPADM

## 2024-06-13 RX ORDER — HYDROCODONE BITARTRATE AND ACETAMINOPHEN 10; 325 MG/1; MG/1
1 TABLET ORAL EVERY 4 HOURS PRN
Status: DISCONTINUED | OUTPATIENT
Start: 2024-06-13 | End: 2024-06-13 | Stop reason: HOSPADM

## 2024-06-13 RX ORDER — PROMETHAZINE HYDROCHLORIDE 25 MG/1
25 TABLET ORAL EVERY 6 HOURS PRN
Status: DISCONTINUED | OUTPATIENT
Start: 2024-06-13 | End: 2024-06-13 | Stop reason: HOSPADM

## 2024-06-13 RX ORDER — DIPHENHYDRAMINE HCL 25 MG
25 CAPSULE ORAL ONCE
Status: COMPLETED | OUTPATIENT
Start: 2024-06-13 | End: 2024-06-13

## 2024-06-13 RX ORDER — HYDRALAZINE HYDROCHLORIDE 20 MG/ML
5 INJECTION INTRAMUSCULAR; INTRAVENOUS ONCE
Status: COMPLETED | OUTPATIENT
Start: 2024-06-13 | End: 2024-06-13

## 2024-06-13 RX ORDER — ONDANSETRON HYDROCHLORIDE 2 MG/ML
INJECTION, SOLUTION INTRAVENOUS
Status: DISCONTINUED | OUTPATIENT
Start: 2024-06-13 | End: 2024-06-13

## 2024-06-13 RX ORDER — SODIUM CHLORIDE, SODIUM LACTATE, POTASSIUM CHLORIDE, CALCIUM CHLORIDE 600; 310; 30; 20 MG/100ML; MG/100ML; MG/100ML; MG/100ML
125 INJECTION, SOLUTION INTRAVENOUS CONTINUOUS
Status: DISCONTINUED | OUTPATIENT
Start: 2024-06-13 | End: 2024-06-13 | Stop reason: HOSPADM

## 2024-06-13 RX ORDER — FENTANYL CITRATE 50 UG/ML
INJECTION, SOLUTION INTRAMUSCULAR; INTRAVENOUS
Status: DISCONTINUED | OUTPATIENT
Start: 2024-06-13 | End: 2024-06-13

## 2024-06-13 RX ORDER — ONDANSETRON 4 MG/1
8 TABLET, ORALLY DISINTEGRATING ORAL EVERY 8 HOURS PRN
Status: DISCONTINUED | OUTPATIENT
Start: 2024-06-13 | End: 2024-06-13 | Stop reason: HOSPADM

## 2024-06-13 RX ORDER — HYDROCODONE BITARTRATE AND ACETAMINOPHEN 5; 325 MG/1; MG/1
1 TABLET ORAL EVERY 6 HOURS PRN
Qty: 28 TABLET | Refills: 0 | Status: SHIPPED | OUTPATIENT
Start: 2024-06-13 | End: 2024-06-20

## 2024-06-13 RX ORDER — HYDROMORPHONE HYDROCHLORIDE 2 MG/ML
0.5 INJECTION, SOLUTION INTRAMUSCULAR; INTRAVENOUS; SUBCUTANEOUS EVERY 5 MIN PRN
Status: COMPLETED | OUTPATIENT
Start: 2024-06-13 | End: 2024-06-13

## 2024-06-13 RX ORDER — ACETAMINOPHEN 500 MG
1000 TABLET ORAL EVERY 6 HOURS PRN
Status: DISCONTINUED | OUTPATIENT
Start: 2024-06-13 | End: 2024-06-13 | Stop reason: HOSPADM

## 2024-06-13 RX ORDER — HYDROCODONE BITARTRATE AND ACETAMINOPHEN 5; 325 MG/1; MG/1
1 TABLET ORAL EVERY 4 HOURS PRN
Status: DISCONTINUED | OUTPATIENT
Start: 2024-06-13 | End: 2024-06-13 | Stop reason: HOSPADM

## 2024-06-13 RX ORDER — DIPHENHYDRAMINE HYDROCHLORIDE 50 MG/ML
25 INJECTION INTRAMUSCULAR; INTRAVENOUS EVERY 6 HOURS PRN
Status: DISCONTINUED | OUTPATIENT
Start: 2024-06-13 | End: 2024-06-13 | Stop reason: HOSPADM

## 2024-06-13 RX ORDER — DEXAMETHASONE SODIUM PHOSPHATE 4 MG/ML
INJECTION, SOLUTION INTRA-ARTICULAR; INTRALESIONAL; INTRAMUSCULAR; INTRAVENOUS; SOFT TISSUE
Status: DISCONTINUED | OUTPATIENT
Start: 2024-06-13 | End: 2024-06-13

## 2024-06-13 RX ORDER — ONDANSETRON HYDROCHLORIDE 2 MG/ML
4 INJECTION, SOLUTION INTRAVENOUS DAILY PRN
Status: DISCONTINUED | OUTPATIENT
Start: 2024-06-13 | End: 2024-06-13 | Stop reason: HOSPADM

## 2024-06-13 RX ORDER — MEPERIDINE HYDROCHLORIDE 25 MG/ML
25 INJECTION INTRAMUSCULAR; INTRAVENOUS; SUBCUTANEOUS EVERY 10 MIN PRN
Status: DISCONTINUED | OUTPATIENT
Start: 2024-06-13 | End: 2024-06-13 | Stop reason: HOSPADM

## 2024-06-13 RX ORDER — DIPHENHYDRAMINE HCL 25 MG
25 CAPSULE ORAL EVERY 6 HOURS PRN
Status: DISCONTINUED | OUTPATIENT
Start: 2024-06-13 | End: 2024-06-13

## 2024-06-13 RX ORDER — SODIUM CHLORIDE, SODIUM LACTATE, POTASSIUM CHLORIDE, CALCIUM CHLORIDE 600; 310; 30; 20 MG/100ML; MG/100ML; MG/100ML; MG/100ML
INJECTION, SOLUTION INTRAVENOUS CONTINUOUS
Status: DISCONTINUED | OUTPATIENT
Start: 2024-06-13 | End: 2024-06-13 | Stop reason: HOSPADM

## 2024-06-13 RX ORDER — MORPHINE SULFATE 10 MG/ML
4 INJECTION INTRAMUSCULAR; INTRAVENOUS; SUBCUTANEOUS EVERY 5 MIN PRN
Status: DISCONTINUED | OUTPATIENT
Start: 2024-06-13 | End: 2024-06-13 | Stop reason: HOSPADM

## 2024-06-13 RX ORDER — OXYCODONE HYDROCHLORIDE 5 MG/1
5 TABLET ORAL
Status: DISCONTINUED | OUTPATIENT
Start: 2024-06-13 | End: 2024-06-13 | Stop reason: HOSPADM

## 2024-06-13 RX ADMIN — DEXAMETHASONE SODIUM PHOSPHATE 8 MG: 4 INJECTION, SOLUTION INTRA-ARTICULAR; INTRALESIONAL; INTRAMUSCULAR; INTRAVENOUS; SOFT TISSUE at 04:06

## 2024-06-13 RX ADMIN — CLINDAMYCIN IN 5 PERCENT DEXTROSE 900 MG: 18 INJECTION, SOLUTION INTRAVENOUS at 04:06

## 2024-06-13 RX ADMIN — MIDAZOLAM HYDROCHLORIDE 2 MG: 5 INJECTION, SOLUTION INTRAMUSCULAR; INTRAVENOUS at 04:06

## 2024-06-13 RX ADMIN — HYDRALAZINE HYDROCHLORIDE 5 MG: 20 INJECTION INTRAMUSCULAR; INTRAVENOUS at 05:06

## 2024-06-13 RX ADMIN — ONDANSETRON 8 MG: 2 INJECTION INTRAMUSCULAR; INTRAVENOUS at 04:06

## 2024-06-13 RX ADMIN — FENTANYL CITRATE 100 MCG: 50 INJECTION INTRAMUSCULAR; INTRAVENOUS at 04:06

## 2024-06-13 RX ADMIN — DIPHENHYDRAMINE HYDROCHLORIDE 25 MG: 25 CAPSULE ORAL at 02:06

## 2024-06-13 RX ADMIN — PROPOFOL 150 MG: 10 INJECTION, EMULSION INTRAVENOUS at 04:06

## 2024-06-13 RX ADMIN — SODIUM CHLORIDE: 9 INJECTION, SOLUTION INTRAVENOUS at 02:06

## 2024-06-13 RX ADMIN — HYDROMORPHONE HYDROCHLORIDE 0.5 MG: 2 INJECTION INTRAMUSCULAR; INTRAVENOUS; SUBCUTANEOUS at 05:06

## 2024-06-13 RX ADMIN — HYDROCODONE BITARTRATE AND ACETAMINOPHEN 1 TABLET: 10; 325 TABLET ORAL at 06:06

## 2024-06-13 RX ADMIN — SODIUM CHLORIDE: 9 INJECTION, SOLUTION INTRAVENOUS at 04:06

## 2024-06-13 NOTE — PROGRESS NOTES
Report given and care released to Shy ANGULO RN. VSS- 131/92, 96% RA, HR 79, resp 16. Karime c/d/I. DP pulse +2. NADN.

## 2024-06-13 NOTE — ANESTHESIA PROCEDURE NOTES
Intubation    Date/Time: 6/13/2024 4:05 PM    Performed by: Gustavo Olson CRNA  Authorized by: Gustavo Olson CRNA    Intubation:     Induction:  Intravenous    Intubated:  Postinduction    Mask Ventilation:  Easy mask    Attempts:  1    Attempted By:  CRNA    Method of Intubation:  Direct    Difficult Airway Encountered?: No      Complications:  None    Airway Device:  Supraglottic airway/LMA    Airway Device Size:  4.0    Style/Cuff Inflation:  Cuffed (inflated to minimal occlusive pressure)    Placement Verified By:  Capnometry    Complicating Factors:  None    Findings Post-Intubation:  BS equal bilateral and atraumatic/condition of teeth unchanged

## 2024-06-13 NOTE — TRANSFER OF CARE
"Anesthesia Transfer of Care Note    Patient: Pili Love    Procedure(s) Performed: Procedure(s) (LRB):  ARTHROSCOPY, KNEE, WITH MEDIAL LATERAL MENISCECTOMY (Right)  ARTHROSCOPY, KNEE, WITH DEBRIDEMENT OF MEDIAL FEMORAL CHONDYLE (Right)    Patient location: PACU    Anesthesia Type: general    Transport from OR: Transported from OR on 6-10 L/min O2 by face mask with adequate spontaneous ventilation    Post pain: adequate analgesia    Post assessment: no apparent anesthetic complications    Post vital signs: stable    Level of consciousness: responds to stimulation, awake and sedated    Nausea/Vomiting: no nausea/vomiting    Complications: none    Transfer of care protocol was followed    Last vitals: Visit Vitals  /86 (BP Location: Right arm, Patient Position: Lying)   Pulse 80   Temp 37 °C (98.6 °F) (Oral)   Resp 13   Ht 5' 3" (1.6 m)   Wt 66.2 kg (146 lb)   SpO2 96%   Breastfeeding No   BMI 25.86 kg/m²     "

## 2024-06-13 NOTE — HPI
Chief complaint:  Right knee pain  History:Pili Love is a 43 y.o. female seen  for evaluation of right knee pain.  She reports having had a right knee arthroscopy during teenage years as result of a dance injury.  She was ostensibly recovered but stepped in a gopher hole 6 weeks ago while running down the heel.  She would subsequent medial knee pain.  She has had a sense intermittent catching and giving way associated with pain.  She was used over-the-counter anti-inflammatory medications and completed a course of physical therapy without improvement.  Indicates pain localized to the medial aspect of the knee.    X-rays of the right knee 03/12/2024 shows the bones well mineralized.  Joint spaces are generally well maintained.  He was he was a hint of tiny medial joint osteophyte formation.  No evidence of fracture, dislocation or pathologic bone.    MRI examination right knee 05/17/2024 suggests complex tear of the posterior horn of the medial meniscus.  There was evidence of mild medial compartment osteoarthrosis  Impression:  Medial meniscal tear-right knee   Plan:  Right knee arthroscopy.  Procedure was discussed in detail.  Potential benefits and risks of surgery outlined to include but not limited to bleeding, infection, damage to blood vessels and nerves, need for further surgery, other risks and complications including even death the patient wished to proceed.  Limitations relative to potential DJD were emphasized.       P

## 2024-06-13 NOTE — ANESTHESIA POSTPROCEDURE EVALUATION
Anesthesia Post Evaluation    Patient: Pili Love    Procedure(s) Performed: Procedure(s) (LRB):  ARTHROSCOPY, KNEE, WITH MEDIAL LATERAL MENISCECTOMY (Right)  ARTHROSCOPY, KNEE, WITH DEBRIDEMENT OF MEDIAL FEMORAL CHONDYLE (Right)    Final Anesthesia Type: general      Patient location during evaluation: PACU  Patient participation: Yes- Able to Participate  Level of consciousness: awake and alert  Post-procedure vital signs: reviewed and stable  Pain management: adequate  Airway patency: patent  KAYLENE mitigation strategies: Multimodal analgesia  PONV status at discharge: No PONV  Anesthetic complications: no      Cardiovascular status: blood pressure returned to baseline  Respiratory status: unassisted  Hydration status: euvolemic  Follow-up not needed.              Vitals Value Taken Time   /101 06/13/24 1738   Temp 37 °C (98.6 °F) 06/13/24 1709   Pulse 76 06/13/24 1741   Resp 14 06/13/24 1741   SpO2 92 % 06/13/24 1741   Vitals shown include unfiled device data.      No case tracking events are documented in the log.      Pain/Kizzy Score: Pain Rating Prior to Med Admin: 7 (6/13/2024  5:36 PM)  Kizzy Score: 10 (6/13/2024  5:30 PM)

## 2024-06-13 NOTE — BRIEF OP NOTE
"[facility] - Orthopedic Periop Services  Brief Operative Note    Surgery Date: 6/13/2024     Surgeons and Role:     * Reinaldo Palmer MD - Primary    Assisting Surgeon: None    Pre-op Diagnosis:  Complex tear of medial meniscus of right knee as current injury, initial encounter [S83.231A]    Post-op Diagnosis:  Post-Op Diagnosis Codes:     * Complex tear of medial meniscus of right knee as current injury, initial encounter [S83.231A]    Procedure(s) (LRB):  ARTHROSCOPY, KNEE, WITH MEDIAL LATERAL MENISCECTOMY (Right)  ARTHROSCOPY, KNEE, WITH DEBRIDEMENT OF MEDIAL FEMORAL CHONDYLE (Right)    Anesthesia: General    Description of the findings of the procedure(s): See Op Note     Estimated Blood Loss: 1 mL         Specimens:   Specimen (24h ago, onward)      None              Discharge Note    OUTCOME: Patient tolerated treatment/procedure well without complication and is now ready for discharge.    DISPOSITION: Home or Self Care    FINAL DIAGNOSIS:  Acute medial meniscus tear of right knee    FOLLOWUP: In clinic    DISCHARGE INSTRUCTIONS:    Discharge Procedure Orders   WALKER FOR HOME USE     Order Specific Question Answer Comments   Type of Walker: Adult (5'4"-6'6")    With wheels? No    Height: 5' 3" (1.6 m)    Weight: 66.2 kg (146 lb)    Length of need (1-99 months): 2    Does patient have medical equipment at home? none    Please check all that apply: Patient's condition impairs ambulation.      Diet general     Keep surgical extremity elevated     Ice to affected area   Order Comments: using barrier between ice and skin (specify duration&frequency)     Change dressing (specify)   Order Comments: Dressing change: one time per day beginning 72 hours post op.     Call MD for:  temperature >100.4     Call MD for:  persistent nausea and vomiting     Call MD for:  severe uncontrolled pain     Call MD for:  difficulty breathing, headache or visual disturbances     Call MD for:  redness, tenderness, or signs " of infection (pain, swelling, redness, odor or green/yellow discharge around incision site)     Call MD for:  hives     Call MD for:  persistent dizziness or light-headedness     Call MD for:  extreme fatigue     Remove dressing in 48 hours     Activity as tolerated     Shower on day dressing removed (No bath)     Weight bearing as tolerated

## 2024-06-13 NOTE — PLAN OF CARE
Patient is complaining of burning and itching on her right leg where she was wiped with CHG wipes.  There are multiple scattered papules on the right leg where she was wiped down.  No swelling, whelping or generalized redness.  She is scratching the area. No signs of anaphylaxis. The area was wiped down with antibacterial soap and a wet rag, then wiped again with just a wet rag.  Dr. James notified and new orders received.

## 2024-06-13 NOTE — PLAN OF CARE
Ss consulted for rw. SS spoke with pt and family, and they are agreeable to obtaining rw from [facility]. SS faxed referral and notified Cassy of referral. SS following

## 2024-06-13 NOTE — OP NOTE
[facility] - Orthopedic Periop Services  Surgery Department  Operative Note    SUMMARY     Date of Procedure: 6/13/2024     Procedure: Procedure(s) (LRB):  ARTHROSCOPY, KNEE, WITH MEDIAL LATERAL MENISCECTOMY (Right)  ARTHROSCOPY, KNEE, WITH DEBRIDEMENT OF MEDIAL FEMORAL CHONDYLE (Right)     Surgeons and Role:     * Reinaldo Palmer MD - Primary    Assisting Surgeon: None    Pre-Operative Diagnosis: Complex tear of medial meniscus of right knee as current injury, initial encounter [S83.231A]    Post-Operative Diagnosis: Post-Op Diagnosis Codes:     * Complex tear of medial meniscus of right knee as current injury, initial encounter [S83.231A]   Anesthesia: General    Technical Procedures Used:  [facility] OF ORTHOPEDIC SURGERY                OPERATIVE REPORT     NAME:  Pili Love  MRN: [id number]               DATE OF SURGERY:  06/13/2024     PREOPERATIVE DIAGNOSIS:  right knee internal derangement    POSTOPERATIVE DIAGNOSIS:  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    PROCEDURE:  Examination under anesthesia, arthroscopy with intraarticular probing, shaving medial femoral condyle, partial medial and lateral meniscectomy    PROCEDURE IN DETAIL:  The patient was taken to the operating room and placed in the supine position.  After adequate level of general anesthesia had been achieved (See Anesthesia Note) patients right knee was examined.  There was 1+ intraarticular effusion.  Knee range of motion was 0-130 degrees of flexion.  Lachman exam was negative as is the FRD.  There is no medial or ligamentous instability appreciated.  Court test produced intermittent popping felt to emanate from the lateral aspect of the joint.  Now, the rightlower extremity was scrubbed with Betadine and draped in sterile fashion.  The right lower  extremity was elevated, exsanguinated with a Edwin bandage.  A pneumatic tourniquet about the upper thigh, to pressure of 300 millimeters of Mercury.  The operation was begun by making a small stab wound about the superolateral aspect of the right patella.  A trocar was introduced into the suprapatellar pouch and the knee was distended with sterile saline.  Second and third stab wounds were made about the medial and lateral aspects of the patellar tendon for introduction of the arthroscopy camera and intraarticular probe.  Now a systematic evaluation of the knee was carried out.  Examination of the suprapatellar pouch was unremarkable.  Examination of patellofemoral joint revealed normal cartilaginous surfaces and good tracking in the midline.  Lateral remarkable with no loose bodies encountered.  Exam of the medial joint showed an area of grade 2-3/4 DJD involving weight-bearing articular surface of the medial femoral condyle.  This was lightly debrided with a shaver.  There was a complex degenerative tear involving the entire posterior medial meniscus.  The torn portion meniscus was excised as were smoothed with a shaver contoured with the radiofrequency Wand.  Examiner condylar notch revealed intact anterior and posterior she would ligaments.  Exam of the lateral joint showed normal femoral tibial cartilage.  There was some degenerative tearing of the free edge of the midbody region lateral meniscus.  It was debrided with a shaver and contoured with the radiofrequency Wand. Now, the tourniquet was let down.  Hemostasis was achieved with Bovie electrocautery.  Knee joint was irrigated copiously with antibiotic solution and arthroscope withdrawn.  The stab wounds were reapproximated with interrupted 4-0 suture.  The wounds were dressed sterilely.  The patient was taken to the recovery room in satisfactory condition.  ESTIMATED BLOOD LOSS:  5ccs.   Tourniquet time: 25 minutes.  The patient received Ancef antibiotic  intravenously prior to the procedure.      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    Attestation: I was present and scrubbed for the entire procedure.

## 2024-06-13 NOTE — DISCHARGE SUMMARY
"[facility] - Orthopedic Periop Services  Discharge Note  Short Stay    Procedure(s) (LRB):  ARTHROSCOPY, KNEE, WITH MEDIAL LATERAL MENISCECTOMY (Right)  ARTHROSCOPY, KNEE, WITH DEBRIDEMENT OF MEDIAL FEMORAL CHONDYLE (Right)      OUTCOME: Patient tolerated treatment/procedure well without complication and is now ready for discharge.    DISPOSITION: Home or Self Care    FINAL DIAGNOSIS:  Acute medial meniscus tear of right knee    FOLLOWUP: In clinic    DISCHARGE INSTRUCTIONS:    Discharge Procedure Orders   WALKER FOR HOME USE     Order Specific Question Answer Comments   Type of Walker: Adult (5'4"-6'6")    With wheels? No    Height: 5' 3" (1.6 m)    Weight: 66.2 kg (146 lb)    Length of need (1-99 months): 2    Does patient have medical equipment at home? none    Please check all that apply: Patient's condition impairs ambulation.      Diet general     Keep surgical extremity elevated     Ice to affected area   Order Comments: using barrier between ice and skin (specify duration&frequency)     Change dressing (specify)   Order Comments: Dressing change: one time per day beginning 72 hours post op.     Call MD for:  temperature >100.4     Call MD for:  persistent nausea and vomiting     Call MD for:  severe uncontrolled pain     Call MD for:  difficulty breathing, headache or visual disturbances     Call MD for:  redness, tenderness, or signs of infection (pain, swelling, redness, odor or green/yellow discharge around incision site)     Call MD for:  hives     Call MD for:  persistent dizziness or light-headedness     Call MD for:  extreme fatigue     Remove dressing in 48 hours     Activity as tolerated     Shower on day dressing removed (No bath)     Weight bearing as tolerated        TIME SPENT ON DISCHARGE: 15 minutes  "

## 2024-06-13 NOTE — PLAN OF CARE
[facility] - Orthopedic Periop Services  Initial Discharge Assessment       Primary Care Provider: Shoshana Mireles FNP-C    Admission Diagnosis: Complex tear of medial meniscus of right knee as current injury, initial encounter [S83.231A]  Acute medial meniscus tear of right knee [S83.241A]    Admission Date: 6/13/2024  Expected Discharge Date:     Transition of Care Barriers: None    Payor: [employer] / Plan: [employer] [facility] [employer] / Product Type: Commercial /     Extended Emergency Contact Information  Primary Emergency Contact: NOLAN ROMERO  Home Phone: [phone]  Mobile Phone: [phone]  Relation: Other  Preferred language: English   needed? No    Discharge Plan A: Home with family  Discharge Plan B: Home with family      [facility] [address] [place] [address] [address] [place] [address]  [address] [place] [address]  Phone: [phone] Fax: [phone]      Initial Assessment (most recent)       Adult Discharge Assessment - 06/13/24 1445          Discharge Assessment    Assessment Type Discharge Planning Assessment     Confirmed/corrected address, phone number and insurance Yes     Confirmed Demographics Correct on Facesheet     Source of Information patient;family     People in Home significant other;child(charissa), dependent;child(charissa), adult     Do you expect to return to your current living situation? Yes     Do you have help at home or someone to help you manage your care at home? Yes     Who are your caregiver(s) and their phone number(s)? Nolan Romero, mireya, [phone]     Prior to hospitilization cognitive status: Unable to Assess     Current cognitive status: Unable to Assess     Walking or Climbing Stairs Difficulty no     Dressing/Bathing Difficulty no     Readmission within 30 days? No     Patient currently being followed by outpatient case management? No     Do you currently have service(s) that help you manage your care at home? No      Do you take prescription medications? Yes     Do you have prescription coverage? Yes     Coverage [employer]     Do you have any problems affording any of your prescribed medications? No     Is the patient taking medications as prescribed? yes     Who is going to help you get home at discharge?      How do you get to doctors appointments? car, drives self;family or friend will provide     Are you on dialysis? No     Do you take coumadin? No     Discharge Plan A Home with family     Discharge Plan B Home with family     DME Needed Upon Discharge  none     Discharge Plan discussed with: Patient;Spouse/sig other     Transition of Care Barriers None                        SS spoke with pt's , Nolan. Pt lives at home with her  and their children. Pt plans to return to current living arrangements when medically ready for discharge. Pt does not currently have DME and was not being seen by [facility]. SS following for anticipated dc needs.

## 2024-06-13 NOTE — SUBJECTIVE & OBJECTIVE
Past Medical History:   Diagnosis Date    Acute bronchitis 3/24/2021    Anxiety     Asthma     Chronic otitis media of both ears 2023    Depression     Endometriosis, unspecified     History of migraine 2023    History of multiple sclerosis 2023    Lumbosacral spondylosis without myelopathy 2022    Migraine     Multiple sclerosis     Dr Antonio       Past Surgical History:   Procedure Laterality Date     bilateral cervical paraspinous muscle trigger point injection Bilateral [id number]    D Shows  x 4     SECTION      FOOT SURGERY Left     left toe fusion    HYSTERECTOMY      INJECTION OF ANESTHETIC AGENT AROUND MEDIAL BRANCH NERVES INNERVATING LUMBAR FACET JOINT Bilateral 2022    Procedure: BLOCK, NERVE, FACET JOINT, LUMBAR, MEDIAL BRANCH;  Surgeon: Twyla Gaston MD;  Location: [facility];  Service: Pain Management;  Laterality: Bilateral;    INJECTION OF ANESTHETIC AGENT AROUND MEDIAL BRANCH NERVES INNERVATING LUMBAR FACET JOINT Bilateral 11/10/2022    Procedure: Block-nerve-medial branch-lumbar, bilateral L4 through S1;  Surgeon: Twyla Gaston MD;  Location: [facility];  Service: Pain Management;  Laterality: Bilateral;  patient will have to be tested    KNEE CARTILAGE SURGERY Right     LUMBAR PUNCTURE      Dr Trejo    MYRINGOTOMY WITH INSERTION OF VENTILATION TUBE Bilateral 2023    Procedure: MYRINGOTOMY, WITH TYMPANOSTOMY TUBE INSERTION;  Surgeon: Gagan Francis MD;  Location: [facility];  Service: ENT;  Laterality: Bilateral;    RADIOFREQUENCY ABLATION OF LUMBAR MEDIAL BRANCH NERVE AT SINGLE LEVEL Right 12/15/2022    Procedure: Radiofrequency Ablation, Nerve, Spinal, Lumbar, Medial Branch, Level L4-S1;  Surgeon: Twyla Gaston MD;  Location: [facility];  Service: Pain Management;  Laterality: Right;  cong left after right side is done    RADIOFREQUENCY ABLATION OF LUMBAR MEDIAL BRANCH NERVE AT SINGLE LEVEL Left 2023    Procedure:  RADIOFREQUENCY ABLATION, NERVE, SPINAL, LUMBAR, MEDIAL BRANCH, 1 LEVEL;  Surgeon: Twyla Gaston MD;  Location: [facility];  Service: Pain Management;  Laterality: Left;  Left L4-S1 RFTC. Pt had right on 12/15    Right C3-C7 FI Right 04/17/2018    Dr Trejo    TUBAL LIGATION      VAGINAL DELIVERY         Review of patient's allergies indicates:   Allergen Reactions    Cockroach     Grass pollen-bermuda, standard     Grass pollen-veronica, standard     Latex, natural rubber     Peanuts [peanut (legumes)] Other (See Comments)     Allergy testing    Penicillins     Wheat containing prod Other (See Comments)     Allergy testing       No current facility-administered medications for this encounter.     Current Outpatient Medications   Medication Sig    albuterol (VENTOLIN HFA) 90 mcg/actuation inhaler Inhale 2 puffs into the lungs every 6 (six) hours as needed for Wheezing. Rescue    ARIPiprazole (ABILIFY) 20 MG Tab Take by mouth.    baclofen (LIORESAL) 10 MG tablet Take 1 tablet (10 mg total) by mouth 3 (three) times daily.    buPROPion (WELLBUTRIN SR) 200 MG SR12     diclofenac sodium (VOLTAREN ARTHRITIS PAIN) 1 % Gel Apply 2 g topically 2 (two) times daily. Apply 2 grams BID to knees, elbows, hips joints bilaterally as needed    EPINEPHrine (EPIPEN) 0.3 mg/0.3 mL AtIn Inject 0.3 mLs (0.3 mg total) into the muscle once. for 1 dose    EScitalopram oxalate (LEXAPRO) 20 MG tablet Take 20 mg by mouth once daily.    gabapentin (NEURONTIN) 300 MG capsule Take 2 capsules (600 mg total) by mouth 3 (three) times daily.    [START ON 6/30/2024] HYDROcodone-acetaminophen (NORCO)  mg per tablet Take 1 tablet by mouth every 8 (eight) hours.    MAXALT-MLT 10 mg disintegrating tablet Place under the tongue.    metroNIDAZOLE (FLAGYL) 500 MG tablet Take 1 tablet (500 mg total) by mouth 2 (two) times a day. for 7 days    nortriptyline (PAMELOR) 75 MG Cap Take 1 capsule (75 mg total) by mouth every evening.    oxybutynin  (DITROPAN-XL) 5 MG TR24     terconazole (TERAZOL 7) 0.4 % Crea Place 1 applicator vaginally every evening.    tiZANidine 4 mg Cap Take 1 capsule by mouth every 12 (twelve) hours.     Family History       Problem Relation (Age of Onset)    Asthma Father    Cancer Maternal Grandfather    Heart attack Paternal Grandfather    Hypertension Paternal Grandfather, Father          Tobacco Use    Smoking status: Never    Smokeless tobacco: Never   Substance and Sexual Activity    Alcohol use: Never    Drug use: Never    Sexual activity: Yes     Birth control/protection: See Surgical Hx     Review of Systems   Constitutional: Negative.     Objective:     Vital Signs (Most Recent):    Vital Signs (24h Range):  BP: ()/()   Arterial Line BP: ()/()            There is no height or weight on file to calculate BMI.    No intake or output data in the 24 hours ending 06/13/24 1116     General    Vitals reviewed.  Constitutional: She is oriented to person, place, and time. She appears well-developed and well-nourished.   HENT:   Head: Normocephalic and atraumatic.   Eyes: Conjunctivae and EOM are normal.   Neck: Neck supple.   Cardiovascular:  Normal rate, regular rhythm and normal heart sounds.            Pulmonary/Chest: Effort normal and breath sounds normal.   Abdominal: Soft. Bowel sounds are normal.   Neurological: She is alert and oriented to person, place, and time.   Psychiatric: She has a normal mood and affect. Her behavior is normal.     General Musculoskeletal Exam   Gait: antalgic       Right Knee Exam     Inspection   Effusion: present    Tenderness   The patient is tender to palpation of the medial joint line.    Range of Motion   The patient has normal right knee ROM.    Tests   Meniscus   Court:  Medial - positive   Ligament Examination   Lachman: normal (-1 to 2mm)   PCL-Posterior Drawer: normal (0 to 2mm)     MCL - Valgus: normal (0 to 2mm)  LCL - Varus: normal  Pivot Shift: normal (Equal)    Other   Sensation:  normal    Left Knee Exam   Left knee exam is normal.    Vascular Exam     Right Pulses  Dorsalis Pedis:      2+  Posterior Tibial:      2+           Significant Labs: All pertinent labs within the past 24 hours have been reviewed.    Significant Imaging: I have reviewed all pertinent imaging results/findings.

## 2024-06-13 NOTE — PT/OT/SLP EVAL
Physical Therapy Evaluation    Patient Name:  Pili Love   MRN:  [id number]    Recommendations:     Discharge Recommendations: No Therapy Indicated   Discharge Equipment Recommendations: walker, rolling   Barriers to discharge: None    Assessment:     Pili Love is a 43 y.o. female admitted with a medical diagnosis of Acute medial meniscus tear of right knee.  She presents with the following impairments/functional limitations: orthopedic precautions, gait instability, impaired functional mobility Patient with good understanding of post op education.    Rehab Prognosis: Good; patient would benefit from acute skilled PT services to address these deficits and reach maximum level of function.    Recent Surgery: Procedure(s) (LRB):  ARTHROSCOPY, KNEE, WITH MENISCECTOMY (Right) Day of Surgery    Plan:     During this hospitalization, patient to be seen 1 x/week to address the identified rehab impairments via gait training, therapeutic activities and progress toward the following goals:    Plan of Care Expires:  06/13/24    Subjective     Chief Complaint: itching right le  Patient/Family Comments/goals: Plan is knee scope today. Patient prefers walker  Pain/Comfort:  Pain Rating 1: 2/10  Location - Side 1: Right  Location 1: knee  Pain Addressed 1: Cessation of Activity, Pre-medicate for activity    Patients cultural, spiritual, Judaism conflicts given the current situation: no    Living Environment:  Lives with family  Prior to admission, patients level of function was independent.  Equipment used at home: none.  DME owned (not currently used): none.  Upon discharge, patient will have assistance from family.    Objective:     Communicated with nurse prior to session.  Patient found supine with    upon PT entry to room.    General Precautions: Standard,    Orthopedic Precautions:RLE partial weight bearing   Braces:    Respiratory Status: Room air    Exams:  na    Functional Mobility:  Gait: ambulated 8 feet  with rolling walker pwb      AM-PAC 6 CLICK MOBILITY  Total Score:24       Treatment & Education:  Ascope HEP  Pwb with walker    Patient left supine with all lines intact.    GOALS:   Multidisciplinary Problems       Physical Therapy Goals       Not on file                    History:     Past Medical History:   Diagnosis Date    Acute bronchitis 3/24/2021    Anxiety     Asthma     Chronic otitis media of both ears 2023    Depression     Endometriosis, unspecified     History of migraine 2023    History of multiple sclerosis 2023    Lumbosacral spondylosis without myelopathy 2022    Migraine     Multiple sclerosis     Dr Antonio       Past Surgical History:   Procedure Laterality Date     bilateral cervical paraspinous muscle trigger point injection Bilateral [id number]    D Shows  x 4     SECTION      FOOT SURGERY Left     left toe fusion    HYSTERECTOMY      INJECTION OF ANESTHETIC AGENT AROUND MEDIAL BRANCH NERVES INNERVATING LUMBAR FACET JOINT Bilateral 2022    Procedure: BLOCK, NERVE, FACET JOINT, LUMBAR, MEDIAL BRANCH;  Surgeon: Twyla Gaston MD;  Location: [facility];  Service: Pain Management;  Laterality: Bilateral;    INJECTION OF ANESTHETIC AGENT AROUND MEDIAL BRANCH NERVES INNERVATING LUMBAR FACET JOINT Bilateral 11/10/2022    Procedure: Block-nerve-medial branch-lumbar, bilateral L4 through S1;  Surgeon: Twyla Gaston MD;  Location: [facility];  Service: Pain Management;  Laterality: Bilateral;  patient will have to be tested    KNEE CARTILAGE SURGERY Right     LUMBAR PUNCTURE      Dr Trejo    MYRINGOTOMY WITH INSERTION OF VENTILATION TUBE Bilateral 2023    Procedure: MYRINGOTOMY, WITH TYMPANOSTOMY TUBE INSERTION;  Surgeon: Gagan Francis MD;  Location: [facility];  Service: ENT;  Laterality: Bilateral;    RADIOFREQUENCY ABLATION OF LUMBAR MEDIAL BRANCH NERVE AT SINGLE LEVEL Right 12/15/2022    Procedure: Radiofrequency Ablation,  Nerve, Spinal, Lumbar, Medial Branch, Level L4-S1;  Surgeon: Twyla Gaston MD;  [facility]: [facility];  Service: Pain Management;  Laterality: Right;  cong left after right side is done    RADIOFREQUENCY ABLATION OF LUMBAR MEDIAL BRANCH NERVE AT SINGLE LEVEL Left 1/5/2023    Procedure: RADIOFREQUENCY ABLATION, NERVE, SPINAL, LUMBAR, MEDIAL BRANCH, 1 LEVEL;  Surgeon: Twyla Gaston MD;  Location: [facility];  Service: Pain Management;  Laterality: Left;  Left L4-S1 RFTC. Pt had right on 12/15    Right C3-C7 FI Right 04/17/2018    Dr Trejo    TUBAL LIGATION      VAGINAL DELIVERY         Time Tracking:     PT Received On: 06/13/24  PT Start Time: 1410     PT Stop Time: 1430  PT Total Time (min): 20 min     Billable Minutes: Evaluation 20 06/13/2024

## 2024-06-13 NOTE — ANESTHESIA PREPROCEDURE EVALUATION
06/13/2024  Pili Love is a 43 y.o., female.      Pre-op Assessment    I have reviewed the Patient Summary Reports.     I have reviewed the Nursing Notes. I have reviewed the NPO Status.   I have reviewed the Medications.     Review of Systems  Anesthesia Hx:  No problems with previous Anesthesia                Social:  Non-Smoker, No Alcohol Use       Hematology/Oncology:  Hematology Normal   Oncology Normal                                   EENT/Dental:  EENT/Dental Normal           Cardiovascular:  Cardiovascular Normal                                            Pulmonary:    Asthma                    Renal/:  Renal/ Normal                 Hepatic/GI:  Hepatic/GI Normal                 Musculoskeletal:  Arthritis   Polyarthralgia    MULTIPLE SCLEROSIS            Neurological:      Headaches     Multiple Sclerosis      Chronic Pain Syndrome                         Endocrine:  Endocrine Normal            Dermatological:  Skin Normal    Psych:   anxiety                 Physical Exam  General: Well nourished    Airway:  Mallampati: II / II  Mouth Opening: Normal  TM Distance: > 6 cm  Tongue: Normal  Neck ROM: Normal ROM    Chest/Lungs:  Clear to auscultation, Normal Respiratory Rate    Heart:  Rate: Normal  Rhythm: Regular Rhythm        Anesthesia Plan  Type of Anesthesia, risks & benefits discussed:    Anesthesia Type: Gen Supraglottic Airway  Intra-op Monitoring Plan: Standard ASA Monitors  Post Op Pain Control Plan: multimodal analgesia  Induction:  IV  Informed Consent: Informed consent signed with the Patient and all parties understand the risks and agree with anesthesia plan.  All questions answered.   ASA Score: 2  Day of Surgery Review of History & Physical: H&P Update referred to the surgeon/provider.I have interviewed and examined the patient. I have reviewed the patient's H&P dated: There  are no significant changes.     Ready For Surgery From Anesthesia Perspective.     .

## 2024-06-13 NOTE — H&P
[facility] - Orthopedic Periop Services  Orthopedics  H&P    Patient Name: Pili Love  MRN: [id number]  Admission Date: (Not on file)  Primary Care Provider: Carole Alcala NP    Patient information was obtained from patient and ER records.     Subjective:     Principal Problem:Acute medial meniscus tear of right knee    Chief Complaint: No chief complaint on file.       HPI: Chief complaint:  Right knee pain  History:Pili Love is a 43 y.o. female seen  for evaluation of right knee pain.  She reports having had a right knee arthroscopy during teenage years as result of a dance injury.  She was ostensibly recovered but stepped in a gopher hole 6 weeks ago while running down the heel.  She would subsequent medial knee pain.  She has had a sense intermittent catching and giving way associated with pain.  She was used over-the-counter anti-inflammatory medications and completed a course of physical therapy without improvement.  Indicates pain localized to the medial aspect of the knee.    X-rays of the right knee 03/12/2024 shows the bones well mineralized.  Joint spaces are generally well maintained.  He was he was a hint of tiny medial joint osteophyte formation.  No evidence of fracture, dislocation or pathologic bone.    MRI examination right knee 05/17/2024 suggests complex tear of the posterior horn of the medial meniscus.  There was evidence of mild medial compartment osteoarthrosis  Impression:  Medial meniscal tear-right knee   Plan:  Right knee arthroscopy.  Procedure was discussed in detail.  Potential benefits and risks of surgery outlined to include but not limited to bleeding, infection, damage to blood vessels and nerves, need for further surgery, other risks and complications including even death the patient wished to proceed.  Limitations relative to potential DJD were emphasized.       P       Past Medical History:   Diagnosis Date    Acute bronchitis 3/24/2021    Anxiety     Asthma      Chronic otitis media of both ears 2023    Depression     Endometriosis, unspecified     History of migraine 2023    History of multiple sclerosis 2023    Lumbosacral spondylosis without myelopathy 2022    Migraine     Multiple sclerosis     Dr Antonio       Past Surgical History:   Procedure Laterality Date     bilateral cervical paraspinous muscle trigger point injection Bilateral [id number]    D Shows  x 4     SECTION      FOOT SURGERY Left     left toe fusion    HYSTERECTOMY      INJECTION OF ANESTHETIC AGENT AROUND MEDIAL BRANCH NERVES INNERVATING LUMBAR FACET JOINT Bilateral 2022    Procedure: BLOCK, NERVE, FACET JOINT, LUMBAR, MEDIAL BRANCH;  Surgeon: Twyla Gaston MD;  Location: [facility];  Service: Pain Management;  Laterality: Bilateral;    INJECTION OF ANESTHETIC AGENT AROUND MEDIAL BRANCH NERVES INNERVATING LUMBAR FACET JOINT Bilateral 11/10/2022    Procedure: Block-nerve-medial branch-lumbar, bilateral L4 through S1;  Surgeon: Twyla Gaston MD;  Location: [facility];  Service: Pain Management;  Laterality: Bilateral;  patient will have to be tested    KNEE CARTILAGE SURGERY Right     LUMBAR PUNCTURE      Dr Trejo    MYRINGOTOMY WITH INSERTION OF VENTILATION TUBE Bilateral 2023    Procedure: MYRINGOTOMY, WITH TYMPANOSTOMY TUBE INSERTION;  Surgeon: Gagan Francis MD;  Location: [facility];  Service: ENT;  Laterality: Bilateral;    RADIOFREQUENCY ABLATION OF LUMBAR MEDIAL BRANCH NERVE AT SINGLE LEVEL Right 12/15/2022    Procedure: Radiofrequency Ablation, Nerve, Spinal, Lumbar, Medial Branch, Level L4-S1;  Surgeon: Twyla Gaston MD;  Location: [facility];  Service: Pain Management;  Laterality: Right;  cong left after right side is done    RADIOFREQUENCY ABLATION OF LUMBAR MEDIAL BRANCH NERVE AT SINGLE LEVEL Left 2023    Procedure: RADIOFREQUENCY ABLATION, NERVE, SPINAL, LUMBAR, MEDIAL BRANCH, 1 LEVEL;  Surgeon: Twyla VEGA  MD Alek;  Location: [facility];  Service: Pain Management;  Laterality: Left;  Left L4-S1 RFTC. Pt had right on 12/15    Right C3-C7 FI Right 04/17/2018    Dr Trejo    TUBAL LIGATION      VAGINAL DELIVERY         Review of patient's allergies indicates:   Allergen Reactions    Cockroach     Grass pollen-bermuda, standard     Grass pollen-veronica, standard     Latex, natural rubber     Peanuts [peanut (legumes)] Other (See Comments)     Allergy testing    Penicillins     Wheat containing prod Other (See Comments)     Allergy testing       No current facility-administered medications for this encounter.     Current Outpatient Medications   Medication Sig    albuterol (VENTOLIN HFA) 90 mcg/actuation inhaler Inhale 2 puffs into the lungs every 6 (six) hours as needed for Wheezing. Rescue    ARIPiprazole (ABILIFY) 20 MG Tab Take by mouth.    baclofen (LIORESAL) 10 MG tablet Take 1 tablet (10 mg total) by mouth 3 (three) times daily.    buPROPion (WELLBUTRIN SR) 200 MG SR12     diclofenac sodium (VOLTAREN ARTHRITIS PAIN) 1 % Gel Apply 2 g topically 2 (two) times daily. Apply 2 grams BID to knees, elbows, hips joints bilaterally as needed    EPINEPHrine (EPIPEN) 0.3 mg/0.3 mL AtIn Inject 0.3 mLs (0.3 mg total) into the muscle once. for 1 dose    EScitalopram oxalate (LEXAPRO) 20 MG tablet Take 20 mg by mouth once daily.    gabapentin (NEURONTIN) 300 MG capsule Take 2 capsules (600 mg total) by mouth 3 (three) times daily.    [START ON 6/30/2024] HYDROcodone-acetaminophen (NORCO)  mg per tablet Take 1 tablet by mouth every 8 (eight) hours.    MAXALT-MLT 10 mg disintegrating tablet Place under the tongue.    metroNIDAZOLE (FLAGYL) 500 MG tablet Take 1 tablet (500 mg total) by mouth 2 (two) times a day. for 7 days    nortriptyline (PAMELOR) 75 MG Cap Take 1 capsule (75 mg total) by mouth every evening.    oxybutynin (DITROPAN-XL) 5 MG TR24     terconazole (TERAZOL 7) 0.4 % Crea Place 1 applicator vaginally  every evening.    tiZANidine 4 mg Cap Take 1 capsule by mouth every 12 (twelve) hours.     Family History       Problem Relation (Age of Onset)    Asthma Father    Cancer Maternal Grandfather    Heart attack Paternal Grandfather    Hypertension Paternal Grandfather, Father          Tobacco Use    Smoking status: Never    Smokeless tobacco: Never   Substance and Sexual Activity    Alcohol use: Never    Drug use: Never    Sexual activity: Yes     Birth control/protection: See Surgical Hx     Review of Systems   Constitutional: Negative.     Objective:     Vital Signs (Most Recent):    Vital Signs (24h Range):  BP: ()/()   Arterial Line BP: ()/()            There is no height or weight on file to calculate BMI.    No intake or output data in the 24 hours ending 06/13/24 1116     General    Vitals reviewed.  Constitutional: She is oriented to person, place, and time. She appears well-developed and well-nourished.   HENT:   Head: Normocephalic and atraumatic.   Eyes: Conjunctivae and EOM are normal.   Neck: Neck supple.   Cardiovascular:  Normal rate, regular rhythm and normal heart sounds.            Pulmonary/Chest: Effort normal and breath sounds normal.   Abdominal: Soft. Bowel sounds are normal.   Neurological: She is alert and oriented to person, place, and time.   Psychiatric: She has a normal mood and affect. Her behavior is normal.     General Musculoskeletal Exam   Gait: antalgic       Right Knee Exam     Inspection   Effusion: present    Tenderness   The patient is tender to palpation of the medial joint line.    Range of Motion   The patient has normal right knee ROM.    Tests   Meniscus   Court:  Medial - positive   Ligament Examination   Lachman: normal (-1 to 2mm)   PCL-Posterior Drawer: normal (0 to 2mm)     MCL - Valgus: normal (0 to 2mm)  LCL - Varus: normal  Pivot Shift: normal (Equal)    Other   Sensation: normal    Left Knee Exam   Left knee exam is normal.    Vascular Exam     Right  Pulses  Dorsalis Pedis:      2+  Posterior Tibial:      2+           Significant Labs: All pertinent labs within the past 24 hours have been reviewed.    Significant Imaging: I have reviewed all pertinent imaging results/findings.  Assessment/Plan:     No notes have been filed under this hospital service.  Service: Orthopedic Surgery      Reinaldo Palmer MD  Orthopedics  [facility] - Orthopedic Periop Services

## 2024-06-13 NOTE — INTERVAL H&P NOTE
The patient has been examined and the H&P has been reviewed:    I concur with the findings and no changes have occurred since H&P was written.    Surgery risks, benefits and alternative options discussed and understood by patient/family.          Active Hospital Problems    Diagnosis  POA    *Acute medial meniscus tear of right knee [S80.695A]  Yes      Resolved Hospital Problems   No resolved problems to display.

## 2024-06-14 RX ORDER — PROPOFOL 10 MG/ML
VIAL (ML) INTRAVENOUS
Status: DISCONTINUED | OUTPATIENT
Start: 2024-06-13 | End: 2024-06-14

## 2024-06-14 RX ORDER — MIDAZOLAM HYDROCHLORIDE 5 MG/ML
INJECTION INTRAMUSCULAR; INTRAVENOUS
Status: DISCONTINUED | OUTPATIENT
Start: 2024-06-13 | End: 2024-06-14

## 2024-06-14 NOTE — PLAN OF CARE
[facility] - Orthopedic Periop Services  Discharge Final Note    Primary Care Provider: Shoshana Mireles FNP-C    Expected Discharge Date: 6/13/2024    Final Discharge Note (most recent)       Final Note - 06/14/24 0839          Final Note    Assessment Type Final Discharge Note     Anticipated Discharge Disposition Home or Self Care     What phone number can be called within the next 1-3 days to see how you are doing after discharge? [phone]        Post-Acute Status    Post-Acute Authorization HME     E Status Set-up Complete/Auth obtained     Coverage Maryam Rodas     Discharge Delays None known at this time                     Important Message from Medicare              Follow-up providers       Yaneth Nunez PA   Specialty: Orthopedic Surgery    [address] [place] [id number]   Phone: [phone]       Next Steps: Follow up in 1 week(s)              After-discharge care                Durable Medical [facility]       [facility]   Service: Durable Medical [facility]    [address] [address]   Phone: [phone]                             Pt to dc home with family. Pt's dme was delivered to her room from [facility] prior to discharge.

## 2024-06-14 NOTE — ADDENDUM NOTE
Addendum  created 06/14/24 0843 by Gustavo Olson, CRNA    Intraprocedure Meds edited, Orders acknowledged in Narrator

## 2024-06-26 ENCOUNTER — OFFICE VISIT (OUTPATIENT)
Dept: ORTHOPEDICS | Facility: CLINIC | Age: 44
End: 2024-06-26
Payer: COMMERCIAL

## 2024-06-26 DIAGNOSIS — Z98.890 STATUS POST ARTHROSCOPY OF RIGHT KNEE: Primary | ICD-10-CM

## 2024-06-26 PROCEDURE — 99212 OFFICE O/P EST SF 10 MIN: CPT | Mod: PBBFAC

## 2024-06-26 PROCEDURE — 99999 PR PBB SHADOW E&M-EST. PATIENT-LVL II: CPT | Mod: PBBFAC,,,

## 2024-06-26 NOTE — PROGRESS NOTES
Arthroscopy, Knee, With Medial Lateral Meniscectomy - Right and Arthroscopy, Knee, With Debridement Of Medial Femoral Chondyle - Right 2024    Pili Love is a 43 y.o. female seen today for post-op visit.  Patient is 2 weeks status post right knee arthroscopy with partial medial and lateral meniscectomies by Dr. Palmer.  Patient states she is doing well.  She presents today ambulating independently without crutches.  No complaints today.      PAST MEDICAL HISTORY:   Past Medical History:   Diagnosis Date    Acute bronchitis 3/24/2021    Anxiety     Asthma     Chronic otitis media of both ears 2023    Depression     Endometriosis, unspecified     History of migraine 2023    History of multiple sclerosis 2023    Lumbosacral spondylosis without myelopathy 2022    Migraine     Multiple sclerosis     Dr Antonio        PAST SURGICAL HISTORY:   Past Surgical History:   Procedure Laterality Date     bilateral cervical paraspinous muscle trigger point injection Bilateral [id number]    D Shows  x 4     SECTION      FOOT SURGERY Left     left toe fusion    HYSTERECTOMY      INJECTION OF ANESTHETIC AGENT AROUND MEDIAL BRANCH NERVES INNERVATING LUMBAR FACET JOINT Bilateral 2022    Procedure: BLOCK, NERVE, FACET JOINT, LUMBAR, MEDIAL BRANCH;  Surgeon: Twyla Gaston MD;  Location: [facility];  Service: Pain Management;  Laterality: Bilateral;    INJECTION OF ANESTHETIC AGENT AROUND MEDIAL BRANCH NERVES INNERVATING LUMBAR FACET JOINT Bilateral 11/10/2022    Procedure: Block-nerve-medial branch-lumbar, bilateral L4 through S1;  Surgeon: Twyla Gaston MD;  Location: [facility];  Service: Pain Management;  Laterality: Bilateral;  patient will have to be tested    KNEE ARTHROSCOPY W/ DEBRIDEMENT Right 2024    Procedure: ARTHROSCOPY, KNEE, WITH DEBRIDEMENT OF MEDIAL FEMORAL CHONDYLE;  Surgeon: Reinaldo Palmer MD;  Location: [facility];   Service: Orthopedics;  Laterality: Right;    KNEE ARTHROSCOPY W/ MENISCECTOMY Right 6/13/2024    Procedure: ARTHROSCOPY, KNEE, WITH MEDIAL LATERAL MENISCECTOMY;  Surgeon: Reinaldo Palmer MD;  Location: [facility] OR;  Service: Orthopedics;  Laterality: Right;    KNEE CARTILAGE SURGERY Right     LUMBAR PUNCTURE  2016    Dr Trejo    MYRINGOTOMY WITH INSERTION OF VENTILATION TUBE Bilateral 2/17/2023    Procedure: MYRINGOTOMY, WITH TYMPANOSTOMY TUBE INSERTION;  Surgeon: Gagan Francis MD;  Location: [facility] OR;  Service: ENT;  Laterality: Bilateral;    RADIOFREQUENCY ABLATION OF LUMBAR MEDIAL BRANCH NERVE AT SINGLE LEVEL Right 12/15/2022    Procedure: Radiofrequency Ablation, Nerve, Spinal, Lumbar, Medial Branch, Level L4-S1;  Surgeon: Twyla Gaston MD;  Location: [facility];  Service: Pain Management;  Laterality: Right;  cong left after right side is done    RADIOFREQUENCY ABLATION OF LUMBAR MEDIAL BRANCH NERVE AT SINGLE LEVEL Left 1/5/2023    Procedure: RADIOFREQUENCY ABLATION, NERVE, SPINAL, LUMBAR, MEDIAL BRANCH, 1 LEVEL;  Surgeon: Twyla Gaston MD;  Location: [facility];  Service: Pain Management;  Laterality: Left;  Left L4-S1 RFTC. Pt had right on 12/15    Right C3-C7 FI Right 04/17/2018    Dr Trejo    TUBAL LIGATION      VAGINAL DELIVERY          MEDICATIONS:   Current Outpatient Medications:     albuterol (VENTOLIN HFA) 90 mcg/actuation inhaler, Inhale 2 puffs into the lungs every 6 (six) hours as needed for Wheezing. Rescue, Disp: 18 g, Rfl: 0    ARIPiprazole (ABILIFY) 20 MG Tab, Take by mouth., Disp: , Rfl:     baclofen (LIORESAL) 10 MG tablet, Take 1 tablet (10 mg total) by mouth 3 (three) times daily., Disp: 90 tablet, Rfl: 1    buPROPion (WELLBUTRIN SR) 200 MG SR12, , Disp: , Rfl:     diclofenac sodium (VOLTAREN ARTHRITIS PAIN) 1 % Gel, Apply 2 g topically 2 (two) times daily. Apply 2 grams BID to knees, elbows, hips joints bilaterally as needed, Disp: 10 each, Rfl:  2    EPINEPHrine (EPIPEN) 0.3 mg/0.3 mL AtIn, Inject 0.3 mLs (0.3 mg total) into the muscle once. for 1 dose, Disp: 0.3 mL, Rfl: 0    gabapentin (NEURONTIN) 300 MG capsule, Take 2 capsules (600 mg total) by mouth 3 (three) times daily., Disp: 270 capsule, Rfl: 3    [START ON 6/30/2024] HYDROcodone-acetaminophen (NORCO)  mg per tablet, Take 1 tablet by mouth every 8 (eight) hours., Disp: 90 tablet, Rfl: 0    MAXALT-MLT 10 mg disintegrating tablet, Place under the tongue., Disp: , Rfl:     nortriptyline (PAMELOR) 75 MG Cap, Take 1 capsule (75 mg total) by mouth every evening., Disp: 90 capsule, Rfl: 3    oxybutynin (DITROPAN-XL) 5 MG TR24, , Disp: , Rfl:     tiZANidine 4 mg Cap, Take 1 capsule by mouth every 12 (twelve) hours., Disp: 60 capsule, Rfl: 1       PHYSICAL EXAM:      GENERAL: Well-developed, well-nourished female . The patient is alert, oriented and cooperative.    EXTREMITIES:  Right knee with skin clean dry and intact, minimal swelling and erythema around incision site, good range of motion of knee from 0-100 degrees, knee is stable to varus and valgus stress testing    WOUND: Incisions are clean,dry,intact without signs of infection and healing well      RADIOGRAPHIC FINDINGS:   No results found.     Patient Active Problem List    Diagnosis Date Noted    Acute medial meniscus tear of right knee 06/13/2024    Chronic pain of right knee 10/17/2023    Depressive disorder 08/30/2023    Chronic pain disorder 08/30/2023    Asthma 08/30/2023    Anxiety 08/30/2023    Trochanteric bursitis of right hip 06/19/2023    Polyarthralgia 01/30/2023    Neck pain 08/17/2021    Myalgia 08/17/2021    Multiple sclerosis 05/24/2021    Cervical radiculopathy 05/24/2021    Migraine     Lumbosacral radiculopathy     Chronic migraine without aura with status migrainosus, not intractable 06/21/2017     IMPRESSION AND PLAN: Patient is status-post Arthroscopy, Knee, With Medial Lateral Meniscectomy - Right and Arthroscopy,  Knee, With Debridement Of Medial Femoral Chondyle - Right doing well.  All incisional sutures removed today and replaced with Steri-Strips, discussed that these can get wet in the shower in in 2-3 days, let them fall off on their own.  Patient was already in physical therapy for her right knee.  Continue activities and weight-bearing as tolerated.  Follow up with Dr. Palmer in 4 weeks, sooner as needed.      No follow-ups on file.       Yaneth Nunez PA-C      (Subject to voice recognition error, transcription service not allowed)

## 2024-07-23 NOTE — PROGRESS NOTES
Subjective:         Patient ID: Pili Love is a 43 y.o. female.    Chief Complaint: Neck Pain, Shoulder Pain, Hip Pain, and Knee Pain        Pain  This is a chronic problem. The current episode started more than 1 year ago. The problem occurs daily. The problem has been waxing and waning. Associated symptoms include arthralgias, myalgias and neck pain. Pertinent negatives include no anorexia, chest pain, chills, coughing, diaphoresis, fever, sore throat, vertigo or vomiting.     Review of Systems   Constitutional:  Negative for activity change, appetite change, chills, diaphoresis and fever.   HENT:  Negative for drooling, ear discharge, ear pain, facial swelling, nosebleeds, sore throat, trouble swallowing, voice change and goiter.    Eyes:  Negative for photophobia, pain, discharge, redness and visual disturbance.   Respiratory:  Negative for apnea, cough, choking, chest tightness, shortness of breath, wheezing and stridor.    Cardiovascular:  Negative for chest pain, palpitations and leg swelling.   Gastrointestinal:  Negative for abdominal distention, anorexia, diarrhea, rectal pain, vomiting and fecal incontinence.   Endocrine: Negative for cold intolerance, heat intolerance, polydipsia, polyphagia and polyuria.   Genitourinary:  Negative for flank pain, frequency and hot flashes.   Musculoskeletal:  Positive for arthralgias, back pain, myalgias and neck pain.   Integumentary:  Negative for color change and pallor.   Allergic/Immunologic: Negative for immunocompromised state.   Neurological:  Negative for dizziness, vertigo, seizures, syncope, facial asymmetry, speech difficulty, light-headedness, memory loss and coordination difficulties.   Hematological:  Negative for adenopathy. Does not bruise/bleed easily.   Psychiatric/Behavioral:  Negative for agitation, behavioral problems, confusion, decreased concentration, dysphoric mood, hallucinations, self-injury and suicidal ideas. The patient is not  nervous/anxious and is not hyperactive.            Past Medical History:   Diagnosis Date    Acute bronchitis 3/24/2021    Anxiety     Asthma     Chronic otitis media of both ears 2023    Depression     Endometriosis, unspecified     History of migraine 2023    History of multiple sclerosis 2023    Lumbosacral spondylosis without myelopathy 2022    Migraine     Multiple sclerosis     Dr Antonio     Past Surgical History:   Procedure Laterality Date     bilateral cervical paraspinous muscle trigger point injection Bilateral [id number]    D Shows  x 4     SECTION      FOOT SURGERY Left     left toe fusion    HYSTERECTOMY      INJECTION OF ANESTHETIC AGENT AROUND MEDIAL BRANCH NERVES INNERVATING LUMBAR FACET JOINT Bilateral 2022    Procedure: BLOCK, NERVE, FACET JOINT, LUMBAR, MEDIAL BRANCH;  Surgeon: Twyla Gaston MD;  Location: [facility];  Service: Pain Management;  Laterality: Bilateral;    INJECTION OF ANESTHETIC AGENT AROUND MEDIAL BRANCH NERVES INNERVATING LUMBAR FACET JOINT Bilateral 11/10/2022    Procedure: Block-nerve-medial branch-lumbar, bilateral L4 through S1;  Surgeon: Twyla Gaston MD;  Location: [facility];  Service: Pain Management;  Laterality: Bilateral;  patient will have to be tested    KNEE ARTHROSCOPY W/ DEBRIDEMENT Right 2024    Procedure: ARTHROSCOPY, KNEE, WITH DEBRIDEMENT OF MEDIAL FEMORAL CHONDYLE;  Surgeon: Reinaldo Palmer MD;  Location: [facility] OR;  Service: Orthopedics;  Laterality: Right;    KNEE ARTHROSCOPY W/ MENISCECTOMY Right 2024    Procedure: ARTHROSCOPY, KNEE, WITH MEDIAL LATERAL MENISCECTOMY;  Surgeon: Reianldo Palmer MD;  Location: [facility] OR;  Service: Orthopedics;  Laterality: Right;    KNEE CARTILAGE SURGERY Right     LUMBAR PUNCTURE      Dr Trejo    MYRINGOTOMY WITH INSERTION OF VENTILATION TUBE Bilateral 2023    Procedure: MYRINGOTOMY, WITH TYMPANOSTOMY TUBE INSERTION;  Surgeon:  Gagan Francis MD;  Location: [facility] OR;  Service: ENT;  Laterality: Bilateral;    RADIOFREQUENCY ABLATION OF LUMBAR MEDIAL BRANCH NERVE AT SINGLE LEVEL Right 12/15/2022    Procedure: Radiofrequency Ablation, Nerve, Spinal, Lumbar, Medial Branch, Level L4-S1;  Surgeon: Twyla Gaston MD;  Location: [facility];  Service: Pain Management;  Laterality: Right;  cong left after right side is done    RADIOFREQUENCY ABLATION OF LUMBAR MEDIAL BRANCH NERVE AT SINGLE LEVEL Left 1/5/2023    Procedure: RADIOFREQUENCY ABLATION, NERVE, SPINAL, LUMBAR, MEDIAL BRANCH, 1 LEVEL;  Surgeon: Twyla Gaston MD;  Location: [facility];  Service: Pain Management;  Laterality: Left;  Left L4-S1 RFTC. Pt had right on 12/15    Right C3-C7 FI Right 04/17/2018    Dr Trejo    TUBAL LIGATION      VAGINAL DELIVERY       Social History     Socioeconomic History    Marital status: Single   Tobacco Use    Smoking status: Never    Smokeless tobacco: Current   Substance and Sexual Activity    Alcohol use: Never    Drug use: Never    Sexual activity: Yes     Birth control/protection: See Surgical Hx     Social Determinants of Health     Physical Activity: Inactive (8/30/2023)    Exercise Vital Sign     Days of Exercise per Week: 0 days     Minutes of Exercise per Session: 0 min   Stress: No Stress Concern Present (8/30/2023)    [employer] [place] [employer] - Occupational Stress Questionnaire     Feeling of Stress : Not at all     Family History   Problem Relation Name Age of Onset    Hypertension Paternal Grandfather      Heart attack Paternal Grandfather      Cancer Maternal Grandfather      Hypertension Father      Asthma Father       Review of patient's allergies indicates:   Allergen Reactions    Cockroach     Grass pollen-bermuda, standard     Grass pollen-veronica, standard     Latex, natural rubber     Peanuts [peanut (legumes)] Other (See Comments)     Allergy testing    Penicillins     Wheat containing prod  "Other (See Comments)     Allergy testing    Chlorhexidine gluconate Itching and Rash        Objective:  Vitals:    07/29/24 1359 07/29/24 1401   BP: 135/72    Pulse: 100    Resp: 18    Weight: 61.2 kg (135 lb)    Height: 5' 3" (1.6 m)    PainSc:   8   8             Physical Exam  Vitals and nursing note reviewed. Exam conducted with a chaperone present.   Constitutional:       General: She is awake. She is not in acute distress.     Appearance: Normal appearance. She is not ill-appearing or diaphoretic.   HENT:      Head: Normocephalic and atraumatic.      Nose: Nose normal.      Mouth/Throat:      Mouth: Mucous membranes are moist.      Pharynx: Oropharynx is clear.   Eyes:      Conjunctiva/sclera: Conjunctivae normal.      Pupils: Pupils are equal, round, and reactive to light.   Cardiovascular:      Rate and Rhythm: Normal rate.   Pulmonary:      Effort: Pulmonary effort is normal. No respiratory distress.   Abdominal:      Palpations: Abdomen is soft.      Tenderness: There is no guarding.   Musculoskeletal:         General: Normal range of motion.      Cervical back: Normal range of motion and neck supple. Tenderness present. No rigidity.      Thoracic back: Tenderness present.      Lumbar back: Tenderness present.   Skin:     General: Skin is warm and dry.      Coloration: Skin is not jaundiced or pale.   Neurological:      General: No focal deficit present.      Mental Status: She is alert and oriented to person, place, and time. Mental status is at baseline.      Cranial Nerves: No cranial nerve deficit (II-XII).   Psychiatric:         Mood and Affect: Mood normal.         Behavior: Behavior normal. Behavior is cooperative.         Thought Content: Thought content normal.           MRI Knee Without Contrast Right  Narrative: EXAMINATION:  MRI KNEE WITHOUT CONTRAST RIGHT    CLINICAL HISTORY:  Meniscal tear, previous surgery, new symptoms; Pain in right knee    TECHNIQUE:  Axial sagittal and coronal imaging of " the right knee is performed using T1, proton density, proton density fat-sat, STIR and gradient sequences.    COMPARISON:  None available    FINDINGS:  The anterior and posterior cruciate ligaments are intact without evidence of tear.    The posterior horn medial meniscus has irregular contour and slightly heterogeneous signal there is blunting of the body of the meniscus in the meniscus is slightly extruded from the joint.  There is loss of articular cartilage and small osteophytes in the medial compartment.    Lateral meniscus appears within normal limits.    Small amount of cystic change present in the proximal tibia near the root ligament attachment of the anterior horn lateral meniscus.    The medial and lateral collateral ligament complexes are intact without evidence of abnormality.    There is pitting of articular cartilage and small amount of subchondral changes in the patella.  Otherwise the extensor mechanism is intact and appears within normal limits.    No other abnormal osseous of marrow signal or edema is seen. No focal cartilage defect is present.  Impression: Complex tear and degeneration posterior horn body medial meniscus.  Mild knee osteoarthrosis.    Electronically signed by: aRjesh Olmedo  Date:    05/17/2024  Time:    09:58         Office Visit on 06/11/2024   Component Date Value Ref Range Status    Color, UA 06/11/2024 Yellow   Final    Spec Grav UA 06/11/2024 1.010   Final    pH, UA 06/11/2024 5.5   Final    WBC, UA 06/11/2024 negative   Final    Nitrite, UA 06/11/2024 negative   Final    Protein, POC 06/11/2024 negative   Final    Glucose, UA 06/11/2024 negative   Final    Ketones, UA 06/11/2024 negative   Final    Bilirubin, POC 06/11/2024 negative   Final    Urobilinogen, UA 06/11/2024 0.2   Final    Blood, UA 06/11/2024 Small   Final    FSH 06/11/2024 124.5  1.7 - 134.8 mIU/mL Final    Candida Species 06/11/2024 Negative  Negative, Invalid Final    Gardnerella 06/11/2024 Positive (A)   Negative, Invalid Final    Trichomonas 06/11/2024 Negative  Negative, Invalid Final    Chlamydia by PCR 06/11/2024 Negative  Negative, Invalid Final    N. gonorrhoeae (GC) by PCR 06/11/2024 Negative  Negative, Invalid Final   Clinical Support on 05/20/2024   Component Date Value Ref Range Status    QRS Duration 05/20/2024 82  ms Final    [facility] QTC Calculation 05/20/2024 442  ms Final   Lab Visit on 05/20/2024   Component Date Value Ref Range Status    Sodium 05/20/2024 138  136 - 145 mmol/L Final    Potassium 05/20/2024 4.4  3.5 - 5.1 mmol/L Final    Chloride 05/20/2024 108 (H)  98 - 107 mmol/L Final    CO2 05/20/2024 28  21 - 32 mmol/L Final    Anion Gap 05/20/2024 6 (L)  7 - 16 mmol/L Final    Glucose 05/20/2024 67 (L)  74 - 106 mg/dL Final    BUN 05/20/2024 12  7 - 18 mg/dL Final    Creatinine 05/20/2024 0.98  0.55 - 1.02 mg/dL Final    BUN/Creatinine Ratio 05/20/2024 12  6 - 20 Final    Calcium 05/20/2024 9.4  8.5 - 10.1 mg/dL Final    eGFR 05/20/2024 73  >=60 mL/min/1.73m2 Final    WBC 05/20/2024 8.91  4.50 - 11.00 K/uL Final    RBC 05/20/2024 4.37  4.20 - 5.40 M/uL Final    Hemoglobin 05/20/2024 14.0  12.0 - 16.0 g/dL Final    Hematocrit 05/20/2024 42.9  38.0 - 47.0 % Final    MCV 05/20/2024 98.2 (H)  80.0 - 96.0 fL Final    MCH 05/20/2024 32.0 (H)  27.0 - 31.0 pg Final    MCHC 05/20/2024 32.6  32.0 - 36.0 g/dL Final    RDW 05/20/2024 11.9  11.5 - 14.5 % Final    Platelet Count 05/20/2024 348  150 - 400 K/uL Final    MPV 05/20/2024 9.9  9.4 - 12.4 fL Final    Neutrophils % 05/20/2024 58.8  53.0 - 65.0 % Final    Lymphocytes % 05/20/2024 27.7  27.0 - 41.0 % Final    Monocytes % 05/20/2024 9.2 (H)  2.0 - 6.0 % Final    Eosinophils % 05/20/2024 3.6  1.0 - 4.0 % Final    Basophils % 05/20/2024 0.4  0.0 - 1.0 % Final    Immature Granulocytes % 05/20/2024 0.3  0.0 - 0.4 % Final    nRBC, Auto 05/20/2024 0.0  <=0.0 % Final    Neutrophils, Abs 05/20/2024 5.23  1.80 - 7.70 K/uL Final    Lymphocytes, Absolute 05/20/2024  2.47  1.00 - 4.80 K/uL Final    Monocytes, Absolute 05/20/2024 0.82 (H)  0.00 - 0.80 K/uL Final    Eosinophils, Absolute 05/20/2024 0.32  0.00 - 0.50 K/uL Final    Basophils, Absolute 05/20/2024 0.04  0.00 - 0.20 K/uL Final    Immature Granulocytes, Absolute 05/20/2024 0.03  0.00 - 0.04 K/uL Final    nRBC, Absolute 05/20/2024 0.00  <=0.00 x10e3/uL Final    Diff Type 05/20/2024 Auto   Final   Office Visit on 03/21/2024   Component Date Value Ref Range Status    POC Amphetamines 03/21/2024 Negative  Negative, Inconclusive Final    POC Barbiturates 03/21/2024 Negative  Negative, Inconclusive Final    POC Benzodiazepines 03/21/2024 Negative  Negative, Inconclusive Final    POC Cocaine 03/21/2024 Negative  Negative, Inconclusive Final    POC THC 03/21/2024 Negative  Negative, Inconclusive Final    POC Methadone 03/21/2024 Negative  Negative, Inconclusive Final    POC Methamphetamine 03/21/2024 Negative  Negative, Inconclusive Final    POC Opiates 03/21/2024 Presumptive Positive (A)  Negative, Inconclusive Final    POC Oxycodone 03/21/2024 Negative  Negative, Inconclusive Final    POC Phencyclidine 03/21/2024 Negative  Negative, Inconclusive Final    POC Methylenedioxymethamphetamine * 03/21/2024 Negative  Negative, Inconclusive Final    POC Tricyclic Antidepressants 03/21/2024 Negative  Negative, Inconclusive Final    POC Buprenorphine 03/21/2024 Negative   Final     Acceptable 03/21/2024 Yes   Final    POC Temperature (Urine) 03/21/2024 92   Final         Orders Placed This Encounter   Procedures    X-Ray Cervical Spine AP And Lateral     Standing Status:   Future     Standing Expiration Date:   7/29/2025     Order Specific Question:   Does the patient have a neck collar or brace on?     Answer:   No     Order Specific Question:   Is the patient pregnant?     Answer:   No     Order Specific Question:   May the Radiologist modify the order per protocol to meet the clinical needs of the patient?     Answer:    Yes     Order Specific Question:   Release to patient     Answer:   Immediate    Drug Screen Definitive 14, Urine     Standing Status:   Future     Number of Occurrences:   1     Standing Expiration Date:   9/27/2025     Order Specific Question:   Specimen Source     Answer:   Urine    POCT Urine Drug Screen Presump     Interpretive Information:     Negative:  No drug detected at the cut off level.   Positive:  This result represents presumptive positive for the   tested drug, other substances may yield a positive response other   than the analyte of interest. This result should be utilized for   diagnostic purpose only. Confirmation testing will be performed upon physician request only.              Requested Prescriptions     Signed Prescriptions Disp Refills    baclofen (LIORESAL) 10 MG tablet 90 tablet 1     Sig: Take 1 tablet (10 mg total) by mouth 3 (three) times daily.    HYDROcodone-acetaminophen (NORCO)  mg per tablet 90 tablet 0     Sig: Take 1 tablet by mouth every 8 (eight) hours.    tiZANidine 4 mg Cap 60 capsule 1     Sig: Take 1 capsule by mouth every 12 (twelve) hours.    HYDROcodone-acetaminophen (NORCO)  mg per tablet 90 tablet 0     Sig: Take 1 tablet by mouth every 8 (eight) hours.       Assessment:     1. Neck pain    2. Cervical radiculopathy    3. Multiple sclerosis    4. Lumbosacral spondylosis without myelopathy    5. Myalgia    6. Encounter for long-term (current) use of other medications             A's of Opioid Risk Assessment  Activity:Patient can perform ADL.   Analgesia:Patients pain is partially controlled by current medication. Patient has tried OTC medications such as Tylenol and Ibuprofen with out relief.   Adverse Effects: Patient denies constipation or sedation.  Aberrant Behavior:  reviewed with no aberrant drug seeking/taking behavior.  Overdose reversal drug naloxone discussed    Drug screen reviewed        X-ray lumbar spine [facility] May 2021 multiple  level degenerative changes no fracture noted    MRI [facility] lumbar spine degenerative changes multiple levels will place report under media    X-ray right knee [facility] March 12, 2024, mild tricompartmental degenerative changes right knee, no fracture noted        Plan:      Narcan January 2023      Follows Neurology multiple sclerosis [facility]     Follows orthopedics [facility]   Right knee surgery June 13, 2024    She had  lumbar L4 through S1 bilateral , right side January 5, left side December 15, 2022,   she states she would 60% relief after procedure,   she states the procedure did help increase her level of function       Complaint of cervical spine discomfort worse with flexion extension rotation cervical spine with more than 3 months musculoskeletal in nature     She is requesting cervical trigger point injections     She has had these procedures in the past she states it does help control her discomfort    Has a long history neck  muscle skeletal facet joint in nature  Pain is consistent with trigger-point pattern  Has pain with range of motion cervical spine  Because of pain which limits activity does have some deconditioning in this area due to pain  Focal tenderness paraspinous muscle in this area  Multiple multilevel band paraspinous muscle in this area  Taut response to snapping palpation in this area  Has tried over-the-counter anti-inflammatories tried home exercise program physical therapy none have alleviated the discomfort    Otherwise she states she is doing well after her right knee surgery    She would like to continue with current treatment plan    Continue current medication    Continue home exercise program as directed    Follow-up 2 months     Dr. Gaston, January 2024    Bring original prescription medication bottles/container/box with labels to each visit

## 2024-07-29 ENCOUNTER — OFFICE VISIT (OUTPATIENT)
Dept: PAIN MEDICINE | Facility: CLINIC | Age: 44
End: 2024-07-29
Payer: COMMERCIAL

## 2024-07-29 VITALS
DIASTOLIC BLOOD PRESSURE: 72 MMHG | WEIGHT: 135 LBS | HEIGHT: 63 IN | RESPIRATION RATE: 18 BRPM | BODY MASS INDEX: 23.92 KG/M2 | HEART RATE: 100 BPM | SYSTOLIC BLOOD PRESSURE: 135 MMHG

## 2024-07-29 DIAGNOSIS — Z79.899 ENCOUNTER FOR LONG-TERM (CURRENT) USE OF OTHER MEDICATIONS: ICD-10-CM

## 2024-07-29 DIAGNOSIS — M47.817 LUMBOSACRAL SPONDYLOSIS WITHOUT MYELOPATHY: Chronic | ICD-10-CM

## 2024-07-29 DIAGNOSIS — M54.12 CERVICAL RADICULOPATHY: Chronic | ICD-10-CM

## 2024-07-29 DIAGNOSIS — M54.2 NECK PAIN: Primary | Chronic | ICD-10-CM

## 2024-07-29 DIAGNOSIS — G35 MULTIPLE SCLEROSIS: Chronic | ICD-10-CM

## 2024-07-29 DIAGNOSIS — M79.10 MYALGIA: Chronic | ICD-10-CM

## 2024-07-29 LAB
CTP QC/QA: YES
POC (AMP) AMPHETAMINE: NEGATIVE
POC (BAR) BARBITURATES: NEGATIVE
POC (BUP) BUPRENORPHINE: NEGATIVE
POC (BZO) BENZODIAZEPINES: NEGATIVE
POC (COC) COCAINE: NEGATIVE
POC (MDMA) METHYLENEDIOXYMETHAMPHETAMINE 3,4: NEGATIVE
POC (MET) METHAMPHETAMINE: NEGATIVE
POC (MOP) OPIATES: ABNORMAL
POC (MTD) METHADONE: NEGATIVE
POC (OXY) OXYCODONE: ABNORMAL
POC (PCP) PHENCYCLIDINE: NEGATIVE
POC (TCA) TRICYCLIC ANTIDEPRESSANTS: NEGATIVE
POC TEMPERATURE (URINE): 90
POC THC: NEGATIVE

## 2024-07-29 PROCEDURE — 3078F DIAST BP <80 MM HG: CPT | Mod: CPTII,,, | Performed by: PHYSICIAN ASSISTANT

## 2024-07-29 PROCEDURE — 99214 OFFICE O/P EST MOD 30 MIN: CPT | Mod: S$PBB,,, | Performed by: PHYSICIAN ASSISTANT

## 2024-07-29 PROCEDURE — 99999PBSHW POCT URINE DRUG SCREEN PRESUMP: Mod: PBBFAC,,,

## 2024-07-29 PROCEDURE — 80305 DRUG TEST PRSMV DIR OPT OBS: CPT | Mod: PBBFAC | Performed by: PHYSICIAN ASSISTANT

## 2024-07-29 PROCEDURE — 99214 OFFICE O/P EST MOD 30 MIN: CPT | Mod: PBBFAC | Performed by: PHYSICIAN ASSISTANT

## 2024-07-29 PROCEDURE — 3008F BODY MASS INDEX DOCD: CPT | Mod: CPTII,,, | Performed by: PHYSICIAN ASSISTANT

## 2024-07-29 PROCEDURE — 3075F SYST BP GE 130 - 139MM HG: CPT | Mod: CPTII,,, | Performed by: PHYSICIAN ASSISTANT

## 2024-07-29 PROCEDURE — 99999 PR PBB SHADOW E&M-EST. PATIENT-LVL IV: CPT | Mod: PBBFAC,,, | Performed by: PHYSICIAN ASSISTANT

## 2024-07-29 PROCEDURE — 1159F MED LIST DOCD IN RCRD: CPT | Mod: CPTII,,, | Performed by: PHYSICIAN ASSISTANT

## 2024-07-29 RX ORDER — HYDROCODONE BITARTRATE AND ACETAMINOPHEN 10; 325 MG/1; MG/1
1 TABLET ORAL EVERY 8 HOURS
Qty: 90 TABLET | Refills: 0 | Status: SHIPPED | OUTPATIENT
Start: 2024-08-12 | End: 2024-09-11

## 2024-07-29 RX ORDER — HYDROCODONE BITARTRATE AND ACETAMINOPHEN 10; 325 MG/1; MG/1
1 TABLET ORAL EVERY 8 HOURS
Qty: 90 TABLET | Refills: 0 | Status: SHIPPED | OUTPATIENT
Start: 2024-09-11 | End: 2024-10-11

## 2024-07-29 RX ORDER — BACLOFEN 10 MG/1
10 TABLET ORAL 3 TIMES DAILY
Qty: 90 TABLET | Refills: 1 | Status: SHIPPED | OUTPATIENT
Start: 2024-07-29

## 2024-07-29 RX ORDER — TIZANIDINE HYDROCHLORIDE 4 MG/1
1 CAPSULE, GELATIN COATED ORAL EVERY 12 HOURS
Qty: 60 CAPSULE | Refills: 1 | Status: SHIPPED | OUTPATIENT
Start: 2024-07-29

## 2024-08-02 LAB
6-ACETYLMORPHINE, URINE (RUSH): NEGATIVE 10 NG/ML
7-AMINOCLONAZEPAM, URINE (RUSH): NEGATIVE 25 NG/ML
A-HYDROXYALPRAZOLAM, URINE (RUSH): NEGATIVE 25 NG/ML
AMPHET UR QL SCN: NEGATIVE
BENZOYLECGONINE, URINE (RUSH): NEGATIVE 100 NG/ML
BUPRENORPHINE UR QL SCN: NEGATIVE 25 NG/ML
CODEINE, URINE (RUSH): >250 25 NG/ML
CREAT UR-MCNC: 354 MG/DL (ref 28–219)
EDDP, URINE (RUSH): NEGATIVE 25 NG/ML
FENTANYL, URINE (RUSH): NEGATIVE 2.5 NG/ML
HYDROCODONE, URINE (RUSH): >250 25 NG/ML
HYDROMORPHONE, URINE (RUSH): 173 25 NG/ML
METHADONE UR QL SCN: NEGATIVE 25 NG/ML
METHAMPHET UR QL SCN: NEGATIVE
MORPHINE, URINE (RUSH): NEGATIVE 25 NG/ML
NORBUPRENORPHINE, URINE (RUSH): NEGATIVE 25 NG/ML
NORDIAZEPAM, URINE (RUSH): NEGATIVE 25 NG/ML
NORFENTANYL OXALATE, URINE (RUSH): NEGATIVE 5 NG/ML
NORHYDROCODONE, URINE (RUSH): >500 50 NG/ML
NOROXYCODONE HCL, URINE (RUSH): NEGATIVE 50 NG/ML
OXYCODONE UR QL SCN: NEGATIVE 25 NG/ML
OXYMORPHONE, URINE (RUSH): NEGATIVE 25 NG/ML
PH UR STRIP: 5.5 PH UNITS
SP GR UR STRIP: 1.03
TAPENTADOL, URINE (RUSH): NEGATIVE 25 NG/ML
TEMAZEPAM, URINE (RUSH): NEGATIVE 25 NG/ML
THC-COOH, URINE (RUSH): NEGATIVE 25 NG/ML
TRAMADOL, URINE (RUSH): NEGATIVE 100 NG/ML

## 2024-11-07 NOTE — PROGRESS NOTES
Subjective:         Patient ID: Pili Love is a 44 y.o. female.    Chief Complaint: Low-back Pain, Neck Pain, Leg Pain (Right >left), and Foot Pain        Pain  This is a chronic problem. The current episode started more than 1 year ago. The problem occurs daily. The problem has been unchanged. Associated symptoms include arthralgias, myalgias and neck pain. Pertinent negatives include no anorexia, chest pain, chills, coughing, diaphoresis, fever, sore throat, vertigo or vomiting.     Review of Systems   Constitutional:  Negative for activity change, appetite change, chills, diaphoresis and fever.   HENT:  Negative for drooling, ear discharge, ear pain, facial swelling, nosebleeds, sore throat, trouble swallowing, voice change and goiter.    Eyes:  Negative for photophobia, pain, discharge, redness and visual disturbance.   Respiratory:  Negative for apnea, cough, choking, chest tightness, shortness of breath, wheezing and stridor.    Cardiovascular:  Negative for chest pain, palpitations and leg swelling.   Gastrointestinal:  Negative for abdominal distention, anorexia, diarrhea, rectal pain, vomiting and fecal incontinence.   Endocrine: Negative for cold intolerance, heat intolerance, polydipsia, polyphagia and polyuria.   Genitourinary:  Negative for flank pain, frequency and hot flashes.   Musculoskeletal:  Positive for arthralgias, back pain, myalgias and neck pain.   Integumentary:  Negative for color change and pallor.   Allergic/Immunologic: Negative for immunocompromised state.   Neurological:  Negative for dizziness, vertigo, seizures, syncope, facial asymmetry, speech difficulty, light-headedness, memory loss and coordination difficulties.   Hematological:  Negative for adenopathy. Does not bruise/bleed easily.   Psychiatric/Behavioral:  Negative for agitation, behavioral problems, confusion, decreased concentration, dysphoric mood, hallucinations, self-injury and suicidal ideas. The patient is not  nervous/anxious and is not hyperactive.            Past Medical History:   Diagnosis Date    Acute bronchitis 3/24/2021    Anxiety     Asthma     Chronic otitis media of both ears 2023    Depression     Endometriosis, unspecified     History of migraine 2023    History of multiple sclerosis 2023    Lumbosacral spondylosis without myelopathy 2022    Migraine     Multiple sclerosis     Dr Antonio     Past Surgical History:   Procedure Laterality Date     bilateral cervical paraspinous muscle trigger point injection Bilateral [id number]    D Shows  x 4     SECTION      FOOT SURGERY Left     left toe fusion    HYSTERECTOMY      INJECTION OF ANESTHETIC AGENT AROUND MEDIAL BRANCH NERVES INNERVATING LUMBAR FACET JOINT Bilateral 2022    Procedure: BLOCK, NERVE, FACET JOINT, LUMBAR, MEDIAL BRANCH;  Surgeon: Twyla Gaston MD;  Location: [facility];  Service: Pain Management;  Laterality: Bilateral;    INJECTION OF ANESTHETIC AGENT AROUND MEDIAL BRANCH NERVES INNERVATING LUMBAR FACET JOINT Bilateral 11/10/2022    Procedure: Block-nerve-medial branch-lumbar, bilateral L4 through S1;  Surgeon: Twyla Gaston MD;  Location: [facility];  Service: Pain Management;  Laterality: Bilateral;  patient will have to be tested    KNEE ARTHROSCOPY W/ DEBRIDEMENT Right 2024    Procedure: ARTHROSCOPY, KNEE, WITH DEBRIDEMENT OF MEDIAL FEMORAL CHONDYLE;  Surgeon: Reinaldo Palmer MD;  Location: [facility];  Service: Orthopedics;  Laterality: Right;    KNEE ARTHROSCOPY W/ MENISCECTOMY Right 2024    Procedure: ARTHROSCOPY, KNEE, WITH MEDIAL LATERAL MENISCECTOMY;  Surgeon: Reinaldo Palmer MD;  Location: [facility];  Service: Orthopedics;  Laterality: Right;    KNEE CARTILAGE SURGERY Right     LUMBAR PUNCTURE      Dr Trejo    MYRINGOTOMY WITH INSERTION OF VENTILATION TUBE Bilateral 2023    Procedure: MYRINGOTOMY, WITH TYMPANOSTOMY  TUBE INSERTION;  Surgeon: Gagan Francis MD;  Location: [facility];  Service: ENT;  Laterality: Bilateral;    pelvic reconstractive surgery      RADIOFREQUENCY ABLATION OF LUMBAR MEDIAL BRANCH NERVE AT SINGLE LEVEL Right 12/15/2022    Procedure: Radiofrequency Ablation, Nerve, Spinal, Lumbar, Medial Branch, Level L4-S1;  Surgeon: Twyla Gaston MD;  Location: [facility];  Service: Pain Management;  Laterality: Right;  cong left after right side is done    RADIOFREQUENCY ABLATION OF LUMBAR MEDIAL BRANCH NERVE AT SINGLE LEVEL Left 01/05/2023    Procedure: RADIOFREQUENCY ABLATION, NERVE, SPINAL, LUMBAR, MEDIAL BRANCH, 1 LEVEL;  Surgeon: Twyla Gaston MD;  Location: [facility];  Service: Pain Management;  Laterality: Left;  Left L4-S1 RFTC. Pt had right on 12/15    Right C3-C7 FI Right 04/17/2018    Dr Trejo    TUBAL LIGATION      VAGINAL DELIVERY       Social History     Socioeconomic History    Marital status: Single   Tobacco Use    Smoking status: Never    Smokeless tobacco: Current   Substance and Sexual Activity    Alcohol use: Never    Drug use: Never    Sexual activity: Yes     Birth control/protection: See Surgical Hx     Social Drivers of Health     Physical Activity: Inactive (8/30/2023)    Exercise Vital Sign     Days of Exercise per Week: 0 days     Minutes of Exercise per Session: 0 min   Stress: No Stress Concern Present (8/30/2023)    [employer] [place] [employer] - Occupational Stress Questionnaire     Feeling of Stress : Not at all     Family History   Problem Relation Name Age of Onset    Hypertension Paternal Grandfather      Heart attack Paternal Grandfather      Cancer Maternal Grandfather      Hypertension Father      Asthma Father       Review of patient's allergies indicates:   Allergen Reactions    Cockroach     Grass pollen-bermuda, standard     Grass pollen-veronica, standard     Latex, natural rubber     Peanuts [peanut and  "related legumes] Other (See Comments)     Allergy testing    Penicillins     Wheat containing prod Other (See Comments)     Allergy testing    Chlorhexidine gluconate Itching and Rash        Objective:  Vitals:    11/11/24 1139 11/11/24 1142   BP: 134/84    Pulse: 100    Resp: 20    Weight: 62.6 kg (138 lb)    Height: 5' 3" (1.6 m)    PainSc:   7   8               Physical Exam  Vitals and nursing note reviewed. Exam conducted with a chaperone present.   Constitutional:       General: She is awake. She is not in acute distress.     Appearance: Normal appearance. She is not ill-appearing or diaphoretic.   HENT:      Head: Normocephalic and atraumatic.      Nose: Nose normal.      Mouth/Throat:      Mouth: Mucous membranes are moist.      Pharynx: Oropharynx is clear.   Eyes:      Conjunctiva/sclera: Conjunctivae normal.      Pupils: Pupils are equal, round, and reactive to light.   Cardiovascular:      Rate and Rhythm: Normal rate.   Pulmonary:      Effort: Pulmonary effort is normal. No respiratory distress.   Abdominal:      Palpations: Abdomen is soft.      Tenderness: There is no guarding.   Musculoskeletal:         General: Normal range of motion.      Cervical back: Normal range of motion and neck supple. Tenderness present. No rigidity.      Thoracic back: Tenderness present.      Lumbar back: Tenderness present.   Skin:     General: Skin is warm and dry.      Coloration: Skin is not jaundiced or pale.   Neurological:      General: No focal deficit present.      Mental Status: She is alert and oriented to person, place, and time. Mental status is at baseline.      Cranial Nerves: No cranial nerve deficit (II-XII).   Psychiatric:         Mood and Affect: Mood normal.         Behavior: Behavior normal. Behavior is cooperative.         Thought Content: Thought content normal.         MRI Knee Without Contrast Right  Narrative: EXAMINATION:  MRI KNEE WITHOUT CONTRAST RIGHT    CLINICAL HISTORY:  Meniscal tear, " previous surgery, new symptoms; Pain in right knee    TECHNIQUE:  Axial sagittal and coronal imaging of the right knee is performed using T1, proton density, proton density fat-sat, STIR and gradient sequences.    COMPARISON:  None available    FINDINGS:  The anterior and posterior cruciate ligaments are intact without evidence of tear.    The posterior horn medial meniscus has irregular contour and slightly heterogeneous signal there is blunting of the body of the meniscus in the meniscus is slightly extruded from the joint.  There is loss of articular cartilage and small osteophytes in the medial compartment.    Lateral meniscus appears within normal limits.    Small amount of cystic change present in the proximal tibia near the root ligament attachment of the anterior horn lateral meniscus.    The medial and lateral collateral ligament complexes are intact without evidence of abnormality.    There is pitting of articular cartilage and small amount of subchondral changes in the patella.  Otherwise the extensor mechanism is intact and appears within normal limits.    No other abnormal osseous of marrow signal or edema is seen. No focal cartilage defect is present.  Impression: Complex tear and degeneration posterior horn body medial meniscus.  Mild knee osteoarthrosis.    Electronically signed by: Rajesh Olmedo  Date:    05/17/2024  Time:    09:58         Office Visit on 07/29/2024   Component Date Value Ref Range Status    POC Amphetamines 07/29/2024 Negative  Negative, Inconclusive Final    POC Barbiturates 07/29/2024 Negative  Negative, Inconclusive Final    POC Benzodiazepines 07/29/2024 Negative  Negative, Inconclusive Final    POC Cocaine 07/29/2024 Negative  Negative, Inconclusive Final    POC THC 07/29/2024 Negative  Negative, Inconclusive Final    POC Methadone 07/29/2024 Negative  Negative, Inconclusive Final    POC Methamphetamine 07/29/2024 Negative  Negative, Inconclusive Final    POC Opiates  07/29/2024 Presumptive Positive (A)  Negative, Inconclusive Final    POC Oxycodone 07/29/2024 Presumptive Positive (A)  Negative, Inconclusive Final    POC Phencyclidine 07/29/2024 Negative  Negative, Inconclusive Final    POC Methylenedioxymethamphetamine * 07/29/2024 Negative  Negative, Inconclusive Final    POC Tricyclic Antidepressants 07/29/2024 Negative  Negative, Inconclusive Final    POC Buprenorphine 07/29/2024 Negative   Final     Acceptable 07/29/2024 Yes   Final    POC Temperature (Urine) 07/29/2024 90   Final    pH, UA 07/29/2024 5.5  5.0 to 8.0 pH Units Final    Creatinine, Urine 07/29/2024 354 (H)  28 - 219 mg/dL Final    6-Acetylmorphine 07/29/2024 Negative  10 ng/mL Final    7-Aminoclonazepam 07/29/2024 Negative  Negative 25 ng/mL Final    a-Hydroxyalprazolam 07/29/2024 Negative  Negative 25 ng/mL Final    Benzoylecgonine 07/29/2024 Negative  100 ng/mL Final    Buprenorphine 07/29/2024 Negative  25 ng/mL Final    Codeine 07/29/2024 >250.0 (H)  <25.0 25 ng/mL Final    EDDP 07/29/2024 Negative  25 ng/mL Final    Fentanyl 07/29/2024 Negative  2.5 ng/mL Final    Hydrocodone 07/29/2024 >250.0 (H)  <25.0 25 ng/mL Final    Hydromorphone 07/29/2024 173.0 (H)  <25.0 25 ng/mL Final    Morphine 07/29/2024 Negative  25 ng/mL Final    Norbuprenorphine 07/29/2024 Negative  25 ng/mL Final    Nordiazepam 07/29/2024 Negative  25 ng/mL Final    Norfentanyl Oxalate 07/29/2024 Negative  5 ng/mL Final    Norhydrocodone 07/29/2024 >500.0 (H)  <50.0 50 ng/mL Final    Noroxycodone HCL 07/29/2024 Negative  50 ng/mL Final    Oxymorphone 07/29/2024 Negative  25 ng/mL Final    Tapentadol 07/29/2024 Negative  25 ng/mL Final    Temazepam 07/29/2024 Negative  25 ng/mL Final    THC-COOH 07/29/2024 Negative  25 ng/mL Final    Tramadol 07/29/2024 Negative  100 ng/mL Final    Amphetamine, Urine 07/29/2024 Negative  Negative Final    Methamphetamines, Urine 07/29/2024 Negative  Negative  Final    Methadone, Urine 07/29/2024 Negative  Negative 25 ng/mL Final    Oxycodone, Urine 07/29/2024 Negative  Negative 25 ng/mL Final    Specific Wells, UA 07/29/2024 1.035 (H)  <=1.030 Final   Office Visit on 06/11/2024   Component Date Value Ref Range Status    Color, UA 06/11/2024 Yellow   Final    Spec Grav UA 06/11/2024 1.010   Final    pH, UA 06/11/2024 5.5   Final    WBC, UA 06/11/2024 negative   Final    Nitrite, UA 06/11/2024 negative   Final    Protein, POC 06/11/2024 negative   Final    Glucose, UA 06/11/2024 negative   Final    Ketones, UA 06/11/2024 negative   Final    Bilirubin, POC 06/11/2024 negative   Final    Urobilinogen, UA 06/11/2024 0.2   Final    Blood, UA 06/11/2024 Small   Final    FSH 06/11/2024 124.5  1.7 - 134.8 mIU/mL Final    Candida Species 06/11/2024 Negative  Negative, Invalid Final    Gardnerella 06/11/2024 Positive (A)  Negative, Invalid Final    Trichomonas 06/11/2024 Negative  Negative, Invalid Final    Chlamydia by PCR 06/11/2024 Negative  Negative, Invalid Final    N. gonorrhoeae (GC) by PCR 06/11/2024 Negative  Negative, Invalid Final   Clinical Support on 05/20/2024   Component Date Value Ref Range Status    QRS Duration 05/20/2024 82  ms Final    OHS QTC Calculation 05/20/2024 442  ms Final   Lab Visit on 05/20/2024   Component Date Value Ref Range Status    Sodium 05/20/2024 138  136 - 145 mmol/L Final    Potassium 05/20/2024 4.4  3.5 - 5.1 mmol/L Final    Chloride 05/20/2024 108 (H)  98 - 107 mmol/L Final    CO2 05/20/2024 28  21 - 32 mmol/L Final    Anion Gap 05/20/2024 6 (L)  7 - 16 mmol/L Final    Glucose 05/20/2024 67 (L)  74 - 106 mg/dL Final    BUN 05/20/2024 12  7 - 18 mg/dL Final    Creatinine 05/20/2024 0.98  0.55 - 1.02 mg/dL Final    BUN/Creatinine Ratio 05/20/2024 12  6 - 20 Final    Calcium 05/20/2024 9.4  8.5 - 10.1 mg/dL Final    eGFR 05/20/2024 73  >=60 mL/min/1.73m2 Final    WBC 05/20/2024 8.91  4.50 - 11.00 K/uL Final     RBC 05/20/2024 4.37  4.20 - 5.40 M/uL Final    Hemoglobin 05/20/2024 14.0  12.0 - 16.0 g/dL Final    Hematocrit 05/20/2024 42.9  38.0 - 47.0 % Final    MCV 05/20/2024 98.2 (H)  80.0 - 96.0 fL Final    MCH 05/20/2024 32.0 (H)  27.0 - 31.0 pg Final    MCHC 05/20/2024 32.6  32.0 - 36.0 g/dL Final    RDW 05/20/2024 11.9  11.5 - 14.5 % Final    Platelet Count 05/20/2024 348  150 - 400 K/uL Final    MPV 05/20/2024 9.9  9.4 - 12.4 fL Final    Neutrophils % 05/20/2024 58.8  53.0 - 65.0 % Final    Lymphocytes % 05/20/2024 27.7  27.0 - 41.0 % Final    Monocytes % 05/20/2024 9.2 (H)  2.0 - 6.0 % Final    Eosinophils % 05/20/2024 3.6  1.0 - 4.0 % Final    Basophils % 05/20/2024 0.4  0.0 - 1.0 % Final    Immature Granulocytes % 05/20/2024 0.3  0.0 - 0.4 % Final    nRBC, Auto 05/20/2024 0.0  <=0.0 % Final    Neutrophils, Abs 05/20/2024 5.23  1.80 - 7.70 K/uL Final    Lymphocytes, Absolute 05/20/2024 2.47  1.00 - 4.80 K/uL Final    Monocytes, Absolute 05/20/2024 0.82 (H)  0.00 - 0.80 K/uL Final    Eosinophils, Absolute 05/20/2024 0.32  0.00 - 0.50 K/uL Final    Basophils, Absolute 05/20/2024 0.04  0.00 - 0.20 K/uL Final    Immature Granulocytes, Absolute 05/20/2024 0.03  0.00 - 0.04 K/uL Final    nRBC, Absolute 05/20/2024 0.00  <=0.00 x10e3/uL Final    Diff Type 05/20/2024 Auto   Final         Orders Placed This Encounter   Procedures    POCT Urine Drug Screen Presump     Interpretive Information:     Negative:  No drug detected at the cut off level.   Positive:  This result represents presumptive positive for the   tested drug, other substances may yield a positive response other   than the analyte of interest. This result should be utilized for   diagnostic purpose only. Confirmation testing will be performed upon physician request only.              Requested Prescriptions     Signed Prescriptions Disp Refills    baclofen (LIORESAL) 10 MG tablet 90 tablet 1     Sig: Take 1 tablet (10 mg total) by  mouth 3 (three) times daily.    tiZANidine 4 mg Cap 60 capsule 1     Sig: Take 1 capsule by mouth every 12 (twelve) hours.    HYDROcodone-acetaminophen (NORCO)  mg per tablet 90 tablet 0     Sig: Take 1 tablet by mouth every 8 (eight) hours.    HYDROcodone-acetaminophen (NORCO)  mg per tablet 90 tablet 0     Sig: Take 1 tablet by mouth every 8 (eight) hours.       Assessment:     1. Neck pain    2. Cervical radiculopathy    3. Multiple sclerosis    4. Lumbosacral spondylosis without myelopathy    5. Myalgia    6. Encounter for long-term (current) use of medications               A's of Opioid Risk Assessment  Activity:Patient can perform ADL.   Analgesia:Patients pain is partially controlled by current medication. Patient has tried OTC medications such as Tylenol and Ibuprofen with out relief.   Adverse Effects: Patient denies constipation or sedation.  Aberrant Behavior:  reviewed with no aberrant drug seeking/taking behavior.  Overdose reversal drug naloxone discussed    Drug screen reviewed        X-ray lumbar spine [facility] May 2021 multiple level degenerative changes no fracture noted    [facility] multiple levels will place report under media    X-ray right knee [facility] March 12, 2024, mild tricompartmental degenerative changes right knee, no fracture noted        Plan:      Narcan January 2023      Follows Neurology multiple sclerosis [facility]     Follows orthopedics [facility]   Right knee surgery June 13, 2024    She had  lumbar L4 through S1 bilateral , right side January 5, left side December 15, 2022,   she states she would 60% relief after procedure,   she states the procedure did help increase her level of function       Complaint of cervical spine discomfort worse with flexion extension rotation cervical spine with more than 3 months musculoskeletal in nature     Again today November 11, 2024 She is requesting cervical trigger point  injections     She has had these procedures in the past she states it does help control her discomfort    Has a long history neck  muscle skeletal facet joint in nature  Pain is consistent with trigger-point pattern  Has pain with range of motion cervical spine  Because of pain which limits activity does have some deconditioning in this area due to pain  Focal tenderness paraspinous muscle in this area  Multiple multilevel band paraspinous muscle in this area  Taut response to snapping palpation in this area  Has tried over-the-counter anti-inflammatories tried home exercise program physical therapy none have alleviated the discomfort    Otherwise she states she is doing well after her right knee surgery    She would like to continue with current treatment plan    Continue current medication    Continue home exercise program as directed    Follow-up 2 months     Dr. Gaston, January 2024    Bring original prescription medication bottles/container/box with labels to each visit

## 2024-11-11 ENCOUNTER — OFFICE VISIT (OUTPATIENT)
Dept: PAIN MEDICINE | Facility: CLINIC | Age: 44
End: 2024-11-11
Payer: COMMERCIAL

## 2024-11-11 VITALS
BODY MASS INDEX: 24.45 KG/M2 | DIASTOLIC BLOOD PRESSURE: 84 MMHG | HEART RATE: 100 BPM | WEIGHT: 138 LBS | RESPIRATION RATE: 20 BRPM | SYSTOLIC BLOOD PRESSURE: 134 MMHG | HEIGHT: 63 IN

## 2024-11-11 DIAGNOSIS — Z79.899 ENCOUNTER FOR LONG-TERM (CURRENT) USE OF MEDICATIONS: ICD-10-CM

## 2024-11-11 DIAGNOSIS — G35 MULTIPLE SCLEROSIS: Chronic | ICD-10-CM

## 2024-11-11 DIAGNOSIS — M47.817 LUMBOSACRAL SPONDYLOSIS WITHOUT MYELOPATHY: Chronic | ICD-10-CM

## 2024-11-11 DIAGNOSIS — M54.12 CERVICAL RADICULOPATHY: Chronic | ICD-10-CM

## 2024-11-11 DIAGNOSIS — M54.2 NECK PAIN: Primary | Chronic | ICD-10-CM

## 2024-11-11 DIAGNOSIS — M79.10 MYALGIA: Chronic | ICD-10-CM

## 2024-11-11 PROCEDURE — 99214 OFFICE O/P EST MOD 30 MIN: CPT | Mod: S$PBB,,, | Performed by: PHYSICIAN ASSISTANT

## 2024-11-11 PROCEDURE — 3008F BODY MASS INDEX DOCD: CPT | Mod: CPTII,,, | Performed by: PHYSICIAN ASSISTANT

## 2024-11-11 PROCEDURE — 99214 OFFICE O/P EST MOD 30 MIN: CPT | Mod: PBBFAC | Performed by: PHYSICIAN ASSISTANT

## 2024-11-11 PROCEDURE — 1159F MED LIST DOCD IN RCRD: CPT | Mod: CPTII,,, | Performed by: PHYSICIAN ASSISTANT

## 2024-11-11 PROCEDURE — 3075F SYST BP GE 130 - 139MM HG: CPT | Mod: CPTII,,, | Performed by: PHYSICIAN ASSISTANT

## 2024-11-11 PROCEDURE — 80305 DRUG TEST PRSMV DIR OPT OBS: CPT | Mod: PBBFAC | Performed by: PHYSICIAN ASSISTANT

## 2024-11-11 PROCEDURE — 3079F DIAST BP 80-89 MM HG: CPT | Mod: CPTII,,, | Performed by: PHYSICIAN ASSISTANT

## 2024-11-11 PROCEDURE — 99999PBSHW POCT URINE DRUG SCREEN PRESUMP: Mod: PBBFAC,,,

## 2024-11-11 PROCEDURE — 99999 PR PBB SHADOW E&M-EST. PATIENT-LVL IV: CPT | Mod: PBBFAC,,, | Performed by: PHYSICIAN ASSISTANT

## 2024-11-11 RX ORDER — BACLOFEN 10 MG/1
10 TABLET ORAL 3 TIMES DAILY
Qty: 90 TABLET | Refills: 1 | Status: SHIPPED | OUTPATIENT
Start: 2024-11-11

## 2024-11-11 RX ORDER — HYDROCODONE BITARTRATE AND ACETAMINOPHEN 10; 325 MG/1; MG/1
1 TABLET ORAL EVERY 8 HOURS
Qty: 90 TABLET | Refills: 0 | Status: SHIPPED | OUTPATIENT
Start: 2024-11-11 | End: 2024-12-11

## 2024-11-11 RX ORDER — HYDROCODONE BITARTRATE AND ACETAMINOPHEN 10; 325 MG/1; MG/1
1 TABLET ORAL EVERY 8 HOURS
Qty: 90 TABLET | Refills: 0 | Status: SHIPPED | OUTPATIENT
Start: 2024-12-11 | End: 2025-01-10

## 2024-11-11 RX ORDER — TIZANIDINE HYDROCHLORIDE 4 MG/1
1 CAPSULE, GELATIN COATED ORAL EVERY 12 HOURS
Qty: 60 CAPSULE | Refills: 1 | Status: SHIPPED | OUTPATIENT
Start: 2024-11-11

## 2024-11-12 NOTE — PROGRESS NOTES
Subjective:         Patient ID: Pili Love is a 44 y.o. female.    Chief Complaint: Neck Pain and Shoulder Pain        Pain  This is a chronic problem. The current episode started more than 1 year ago. The problem occurs daily. The problem has been waxing and waning. Associated symptoms include arthralgias, myalgias and neck pain. Pertinent negatives include no anorexia, chest pain, chills, coughing, diaphoresis, fever, sore throat, vertigo or vomiting.     Review of Systems   Constitutional:  Negative for activity change, appetite change, chills, diaphoresis and fever.   HENT:  Negative for drooling, ear discharge, ear pain, facial swelling, nosebleeds, sore throat, trouble swallowing, voice change and goiter.    Eyes:  Negative for photophobia, pain, discharge, redness and visual disturbance.   Respiratory:  Negative for apnea, cough, choking, chest tightness, shortness of breath, wheezing and stridor.    Cardiovascular:  Negative for chest pain, palpitations and leg swelling.   Gastrointestinal:  Negative for abdominal distention, anorexia, diarrhea, rectal pain, vomiting and fecal incontinence.   Endocrine: Negative for cold intolerance, heat intolerance, polydipsia, polyphagia and polyuria.   Genitourinary:  Negative for flank pain, frequency and hot flashes.   Musculoskeletal:  Positive for arthralgias, back pain, myalgias and neck pain.   Integumentary:  Negative for color change and pallor.   Allergic/Immunologic: Negative for immunocompromised state.   Neurological:  Negative for dizziness, vertigo, seizures, syncope, facial asymmetry, speech difficulty, light-headedness, memory loss and coordination difficulties.   Hematological:  Negative for adenopathy. Does not bruise/bleed easily.   Psychiatric/Behavioral:  Negative for agitation, behavioral problems, confusion, decreased concentration, dysphoric mood, hallucinations, self-injury and suicidal ideas. The patient is not nervous/anxious and is not  hyperactive.            Past Medical History:   Diagnosis Date    Acute bronchitis 3/24/2021    Anxiety     Asthma     Chronic otitis media of both ears 2023    Depression     Endometriosis, unspecified     History of migraine 2023    History of multiple sclerosis 2023    Lumbosacral spondylosis without myelopathy 2022    Migraine     Multiple sclerosis     Dr Antonio     Past Surgical History:   Procedure Laterality Date     bilateral cervical paraspinous muscle trigger point injection Bilateral [id number]    D Shows  x 4     SECTION      FOOT SURGERY Left     left toe fusion    HYSTERECTOMY      INJECTION OF ANESTHETIC AGENT AROUND MEDIAL BRANCH NERVES INNERVATING LUMBAR FACET JOINT Bilateral 2022    Procedure: BLOCK, NERVE, FACET JOINT, LUMBAR, MEDIAL BRANCH;  Surgeon: Twyla Gaston MD;  Location: [facility];  Service: Pain Management;  Laterality: Bilateral;    INJECTION OF ANESTHETIC AGENT AROUND MEDIAL BRANCH NERVES INNERVATING LUMBAR FACET JOINT Bilateral 11/10/2022    Procedure: Block-nerve-medial branch-lumbar, bilateral L4 through S1;  Surgeon: Twyla Gaston MD;  Location: [facility];  Service: Pain Management;  Laterality: Bilateral;  patient will have to be tested    KNEE ARTHROSCOPY W/ DEBRIDEMENT Right 2024    Procedure: ARTHROSCOPY, KNEE, WITH DEBRIDEMENT OF MEDIAL FEMORAL CHONDYLE;  Surgeon: Reinaldo Palmer MD;  Location: [facility];  Service: Orthopedics;  Laterality: Right;    KNEE ARTHROSCOPY W/ MENISCECTOMY Right 2024    Procedure: ARTHROSCOPY, KNEE, WITH MEDIAL LATERAL MENISCECTOMY;  Surgeon: Reinaldo Palmer MD;  Location: [facility];  Service: Orthopedics;  Laterality: Right;    KNEE CARTILAGE SURGERY Right     LUMBAR PUNCTURE      Dr Trejo    MYRINGOTOMY WITH INSERTION OF VENTILATION TUBE Bilateral 2023    Procedure: MYRINGOTOMY, WITH TYMPANOSTOMY TUBE INSERTION;  Surgeon: Gagan Francis MD;   Location: [facility];  Service: ENT;  Laterality: Bilateral;    pelvic reconstractive surgery      RADIOFREQUENCY ABLATION OF LUMBAR MEDIAL BRANCH NERVE AT SINGLE LEVEL Right 12/15/2022    Procedure: Radiofrequency Ablation, Nerve, Spinal, Lumbar, Medial Branch, Level L4-S1;  Surgeon: Twyla Gaston MD;  Location: [facility];  Service: Pain Management;  Laterality: Right;  cong left after right side is done    RADIOFREQUENCY ABLATION OF LUMBAR MEDIAL BRANCH NERVE AT SINGLE LEVEL Left 01/05/2023    Procedure: RADIOFREQUENCY ABLATION, NERVE, SPINAL, LUMBAR, MEDIAL BRANCH, 1 LEVEL;  Surgeon: Twyla Gaston MD;  Location: [facility];  Service: Pain Management;  Laterality: Left;  Left L4-S1 RFTC. Pt had right on 12/15    Right C3-C7 FI Right 04/17/2018    Dr Trejo    TUBAL LIGATION      VAGINAL DELIVERY       Social History     Socioeconomic History    Marital status: Single   Tobacco Use    Smoking status: Never    Smokeless tobacco: Current   Substance and Sexual Activity    Alcohol use: Never    Drug use: Never    Sexual activity: Yes     Birth control/protection: See Surgical Hx     Social Drivers of Health     Physical Activity: Inactive (8/30/2023)    Exercise Vital Sign     Days of Exercise per Week: 0 days     Minutes of Exercise per Session: 0 min   Stress: No Stress Concern Present (8/30/2023)    [employer] - Occupational Stress Questionnaire     Feeling of Stress : Not at all     Family History   Problem Relation Name Age of Onset    Hypertension Paternal Grandfather      Heart attack Paternal Grandfather      Cancer Maternal Grandfather      Hypertension Father      Asthma Father       Review of patient's allergies indicates:   Allergen Reactions    Cockroach     Grass pollen-bermuda, standard     Grass pollen-veronica, standard     Latex, natural rubber     Peanuts [peanut and related legumes] Other (See Comments)     Allergy testing    Penicillins      "Wheat containing prod Other (See Comments)     Allergy testing    Chlorhexidine gluconate Itching and Rash        Objective:  Vitals:    11/20/24 1001 11/20/24 1039   BP: 127/77 (!) 116/91   Pulse: 107 102   Resp: 16 20   Weight: 62.6 kg (138 lb)    Height: 5' 3" (1.6 m)    PainSc:   8                  Physical Exam  Vitals and nursing note reviewed. Exam conducted with a chaperone present.   Constitutional:       General: She is awake. She is not in acute distress.     Appearance: Normal appearance. She is not ill-appearing or diaphoretic.   HENT:      Head: Normocephalic and atraumatic.      Nose: Nose normal.      Mouth/Throat:      Mouth: Mucous membranes are moist.      Pharynx: Oropharynx is clear.   Eyes:      Conjunctiva/sclera: Conjunctivae normal.      Pupils: Pupils are equal, round, and reactive to light.   Cardiovascular:      Rate and Rhythm: Normal rate.   Pulmonary:      Effort: Pulmonary effort is normal. No respiratory distress.   Abdominal:      Palpations: Abdomen is soft.      Tenderness: There is no guarding.   Musculoskeletal:         General: Normal range of motion.      Cervical back: Normal range of motion and neck supple. Tenderness present. No rigidity.      Thoracic back: Tenderness present.      Lumbar back: Tenderness present.   Skin:     General: Skin is warm and dry.      Coloration: Skin is not jaundiced or pale.   Neurological:      General: No focal deficit present.      Mental Status: She is alert and oriented to person, place, and time. Mental status is at baseline.      Cranial Nerves: No cranial nerve deficit (II-XII).   Psychiatric:         Mood and Affect: Mood normal.         Behavior: Behavior normal. Behavior is cooperative.         Thought Content: Thought content normal.           MRI Knee Without Contrast Right  Narrative: EXAMINATION:  MRI KNEE WITHOUT CONTRAST RIGHT    CLINICAL HISTORY:  Meniscal tear, previous surgery, new symptoms; Pain in right " knee    TECHNIQUE:  Axial sagittal and coronal imaging of the right knee is performed using T1, proton density, proton density fat-sat, STIR and gradient sequences.    COMPARISON:  None available    FINDINGS:  The anterior and posterior cruciate ligaments are intact without evidence of tear.    The posterior horn medial meniscus has irregular contour and slightly heterogeneous signal there is blunting of the body of the meniscus in the meniscus is slightly extruded from the joint.  There is loss of articular cartilage and small osteophytes in the medial compartment.    Lateral meniscus appears within normal limits.    Small amount of cystic change present in the proximal tibia near the root ligament attachment of the anterior horn lateral meniscus.    The medial and lateral collateral ligament complexes are intact without evidence of abnormality.    There is pitting of articular cartilage and small amount of subchondral changes in the patella.  Otherwise the extensor mechanism is intact and appears within normal limits.    No other abnormal osseous of marrow signal or edema is seen. No focal cartilage defect is present.  Impression: Complex tear and degeneration posterior horn body medial meniscus.  Mild knee osteoarthrosis.    Electronically signed by: Rajesh Olmedo  Date:    05/17/2024  Time:    09:58         Office Visit on 11/11/2024   Component Date Value Ref Range Status    POC Amphetamines 11/11/2024 Negative  Negative, Inconclusive Final    POC Barbiturates 11/11/2024 Negative  Negative, Inconclusive Final    POC Benzodiazepines 11/11/2024 Negative  Negative, Inconclusive Final    POC Cocaine 11/11/2024 Negative  Negative, Inconclusive Final    POC THC 11/11/2024 Negative  Negative, Inconclusive Final    POC Methadone 11/11/2024 Negative  Negative, Inconclusive Final    POC Methamphetamine 11/11/2024 Negative  Negative, Inconclusive Final    POC Opiates 11/11/2024 Presumptive Positive (A)  Negative,  Inconclusive Final    POC Oxycodone 11/11/2024 Negative  Negative, Inconclusive Final    POC Phencyclidine 11/11/2024 Negative  Negative, Inconclusive Final    POC Methylenedioxymethamphetamine * 11/11/2024 Negative  Negative, Inconclusive Final    POC Tricyclic Antidepressants 11/11/2024 Negative  Negative, Inconclusive Final    POC Buprenorphine 11/11/2024 Negative   Final     Acceptable 11/11/2024 Yes   Final    POC Temperature (Urine) 11/11/2024 92   Final   Office Visit on 07/29/2024   Component Date Value Ref Range Status    POC Amphetamines 07/29/2024 Negative  Negative, Inconclusive Final    POC Barbiturates 07/29/2024 Negative  Negative, Inconclusive Final    POC Benzodiazepines 07/29/2024 Negative  Negative, Inconclusive Final    POC Cocaine 07/29/2024 Negative  Negative, Inconclusive Final    POC THC 07/29/2024 Negative  Negative, Inconclusive Final    POC Methadone 07/29/2024 Negative  Negative, Inconclusive Final    POC Methamphetamine 07/29/2024 Negative  Negative, Inconclusive Final    POC Opiates 07/29/2024 Presumptive Positive (A)  Negative, Inconclusive Final    POC Oxycodone 07/29/2024 Presumptive Positive (A)  Negative, Inconclusive Final    POC Phencyclidine 07/29/2024 Negative  Negative, Inconclusive Final    POC Methylenedioxymethamphetamine * 07/29/2024 Negative  Negative, Inconclusive Final    POC Tricyclic Antidepressants 07/29/2024 Negative  Negative, Inconclusive Final    POC Buprenorphine 07/29/2024 Negative   Final     Acceptable 07/29/2024 Yes   Final    POC Temperature (Urine) 07/29/2024 90   Final    pH, UA 07/29/2024 5.5  5.0 to 8.0 pH Units Final    Creatinine, Urine 07/29/2024 354 (H)  28 - 219 mg/dL Final    6-Acetylmorphine 07/29/2024 Negative  10 ng/mL Final    7-Aminoclonazepam 07/29/2024 Negative  Negative 25 ng/mL Final    a-Hydroxyalprazolam 07/29/2024 Negative  Negative 25 ng/mL Final    Benzoylecgonine 07/29/2024 Negative  100 ng/mL Final     Buprenorphine 07/29/2024 Negative  25 ng/mL Final    Codeine 07/29/2024 >250.0 (H)  <25.0 25 ng/mL Final    EDDP 07/29/2024 Negative  25 ng/mL Final    Fentanyl 07/29/2024 Negative  2.5 ng/mL Final    Hydrocodone 07/29/2024 >250.0 (H)  <25.0 25 ng/mL Final    Hydromorphone 07/29/2024 173.0 (H)  <25.0 25 ng/mL Final    Morphine 07/29/2024 Negative  25 ng/mL Final    Norbuprenorphine 07/29/2024 Negative  25 ng/mL Final    Nordiazepam 07/29/2024 Negative  25 ng/mL Final    Norfentanyl Oxalate 07/29/2024 Negative  5 ng/mL Final    Norhydrocodone 07/29/2024 >500.0 (H)  <50.0 50 ng/mL Final    Noroxycodone HCL 07/29/2024 Negative  50 ng/mL Final    Oxymorphone 07/29/2024 Negative  25 ng/mL Final    Tapentadol 07/29/2024 Negative  25 ng/mL Final    Temazepam 07/29/2024 Negative  25 ng/mL Final    THC-COOH 07/29/2024 Negative  25 ng/mL Final    Tramadol 07/29/2024 Negative  100 ng/mL Final    Amphetamine, Urine 07/29/2024 Negative  Negative Final    Methamphetamines, Urine 07/29/2024 Negative  Negative Final    Methadone, Urine 07/29/2024 Negative  Negative 25 ng/mL Final    Oxycodone, Urine 07/29/2024 Negative  Negative 25 ng/mL Final    Specific Gravity, UA 07/29/2024 1.035 (H)  <=1.030 Final   Office Visit on 06/11/2024   Component Date Value Ref Range Status    Color, UA 06/11/2024 Yellow   Final    Spec Grav UA 06/11/2024 1.010   Final    pH, UA 06/11/2024 5.5   Final    WBC, UA 06/11/2024 negative   Final    Nitrite, UA 06/11/2024 negative   Final    Protein, POC 06/11/2024 negative   Final    Glucose, UA 06/11/2024 negative   Final    Ketones, UA 06/11/2024 negative   Final    Bilirubin, POC 06/11/2024 negative   Final    Urobilinogen, UA 06/11/2024 0.2   Final    Blood, UA 06/11/2024 Small   Final    FSH 06/11/2024 124.5  1.7 - 134.8 mIU/mL Final    Candida Species 06/11/2024 Negative  Negative, Invalid Final    Gardnerella 06/11/2024 Positive (A)  Negative, Invalid Final    Trichomonas 06/11/2024 Negative   Negative, Invalid Final    Chlamydia by PCR 06/11/2024 Negative  Negative, Invalid Final    N. gonorrhoeae (GC) by PCR 06/11/2024 Negative  Negative, Invalid Final         Orders Placed This Encounter   Procedures    Trigger Point Injection     This order was created via procedure documentation           Requested Prescriptions      No prescriptions requested or ordered in this encounter       Assessment:     1. Neck pain    2. Cervical radiculopathy    3. Multiple sclerosis    4. Lumbosacral spondylosis without myelopathy    5. Myalgia                 A's of Opioid Risk Assessment  Activity:Patient can perform ADL.   Analgesia:Patients pain is partially controlled by current medication. Patient has tried OTC medications such as Tylenol and Ibuprofen with out relief.   Adverse Effects: Patient denies constipation or sedation.  Aberrant Behavior:  reviewed with no aberrant drug seeking/taking behavior.  Overdose reversal drug naloxone discussed    Drug screen reviewed        X-ray lumbar spine [facility] May 2021 multiple level degenerative changes no fracture noted    MRI [facility] lumbar spine degenerative changes multiple levels will place report under media    X-ray right knee [facility] March 12, 2024, mild tricompartmental degenerative changes right knee, no fracture noted        Plan:    November 20, 2024 has not had hydrocodone since September 2024 due to lack of prior authorization per her insurance company     Will assist patient with prior authorization for Norco          Narcan January 2023      Follows Neurology multiple sclerosis [facility]     Follows orthopedics [facility]   Right knee surgery June 13, 2024    She had  lumbar L4 through S1 bilateral , right side January 5, left side December 15, 2022,   she states she would 60% relief after procedure,   she states the procedure did help increase her level of function         Here today for cervical trigger point injections bilateral    She has had  these procedures in the past she states it does help with her discomfort    Complaint of cervical spine discomfort worse with flexion extension rotation cervical spine with more than 3 months musculoskeletal in nature     Has a long history neck  muscle skeletal facet joint in nature  Pain is consistent with trigger-point pattern  Has pain with range of motion cervical spine  Because of pain which limits activity does have some deconditioning in this area due to pain  Focal tenderness paraspinous muscle in this area  Multiple multilevel band paraspinous muscle in this area  Taut response to snapping palpation in this area  Has tried over-the-counter anti-inflammatories tried home exercise program physical therapy none have alleviated the discomfort    She would like to continue with current medications she states it does help with her discomfort    Continue current medication    Continue home exercise program as directed    Follow-up 2 months     Dr. Gaston, January 2024    Bring original prescription medication bottles/container/box with labels to each visit

## 2024-11-20 ENCOUNTER — OFFICE VISIT (OUTPATIENT)
Dept: PAIN MEDICINE | Facility: CLINIC | Age: 44
End: 2024-11-20
Payer: COMMERCIAL

## 2024-11-20 VITALS
HEART RATE: 102 BPM | BODY MASS INDEX: 24.45 KG/M2 | HEIGHT: 63 IN | WEIGHT: 138 LBS | SYSTOLIC BLOOD PRESSURE: 116 MMHG | RESPIRATION RATE: 20 BRPM | DIASTOLIC BLOOD PRESSURE: 91 MMHG

## 2024-11-20 DIAGNOSIS — M54.2 NECK PAIN: Primary | Chronic | ICD-10-CM

## 2024-11-20 DIAGNOSIS — G35 MULTIPLE SCLEROSIS: Chronic | ICD-10-CM

## 2024-11-20 DIAGNOSIS — M79.10 MYALGIA: Chronic | ICD-10-CM

## 2024-11-20 DIAGNOSIS — M47.817 LUMBOSACRAL SPONDYLOSIS WITHOUT MYELOPATHY: Chronic | ICD-10-CM

## 2024-11-20 DIAGNOSIS — M54.12 CERVICAL RADICULOPATHY: Chronic | ICD-10-CM

## 2024-11-20 PROCEDURE — 1159F MED LIST DOCD IN RCRD: CPT | Mod: CPTII,,, | Performed by: PHYSICIAN ASSISTANT

## 2024-11-20 PROCEDURE — 99215 OFFICE O/P EST HI 40 MIN: CPT | Mod: PBBFAC | Performed by: PHYSICIAN ASSISTANT

## 2024-11-20 PROCEDURE — 99999 PR PBB SHADOW E&M-EST. PATIENT-LVL V: CPT | Mod: PBBFAC,,, | Performed by: PHYSICIAN ASSISTANT

## 2024-11-20 PROCEDURE — 3074F SYST BP LT 130 MM HG: CPT | Mod: CPTII,,, | Performed by: PHYSICIAN ASSISTANT

## 2024-11-20 PROCEDURE — 3008F BODY MASS INDEX DOCD: CPT | Mod: CPTII,,, | Performed by: PHYSICIAN ASSISTANT

## 2024-11-20 PROCEDURE — 99214 OFFICE O/P EST MOD 30 MIN: CPT | Mod: 25,S$PBB,, | Performed by: PHYSICIAN ASSISTANT

## 2024-11-20 PROCEDURE — 20552 NJX 1/MLT TRIGGER POINT 1/2: CPT | Mod: PBBFAC | Performed by: PHYSICIAN ASSISTANT

## 2024-11-20 PROCEDURE — 3078F DIAST BP <80 MM HG: CPT | Mod: CPTII,,, | Performed by: PHYSICIAN ASSISTANT

## 2024-11-20 NOTE — PROCEDURES
Trigger Point Injection    Performed by: Wyatt Franklin PA  Authorized by: Wyatt Franklin PA    Cervical Paraspinal:  Bilateral    Consent Done?:  Yes (Written)    Pre-Procedure:   Indications:  Pain and pain relief  Site marked: the procedure site was marked     Timeout: prior to procedure the correct patient, procedure, and site was verified    Prep: patient was prepped and draped in usual sterile fashion       Bupivacaine 0.25% 25 mg/ 10 ml , 6 cc NDC 2445-1879-29    Kenalog 40 mg [address]-9    Patient tolerated procedure well    No complications noted    Band-Aid was applied to sites

## 2025-03-19 NOTE — PROGRESS NOTES
Subjective:         Patient ID: Pili Love is a 44 y.o. female.    Chief Complaint: Neck Pain, Low-back Pain, and Knee Pain (right)        Pain  This is a chronic problem. The current episode started more than 1 year ago. The problem occurs daily. The problem has been unchanged. Associated symptoms include arthralgias, myalgias and neck pain. Pertinent negatives include no anorexia, chest pain, chills, coughing, diaphoresis, fever, sore throat, vertigo or vomiting.     Review of Systems   Constitutional:  Negative for activity change, appetite change, chills, diaphoresis and fever.   HENT:  Negative for drooling, ear discharge, ear pain, facial swelling, nosebleeds, sore throat, trouble swallowing, voice change and goiter.    Eyes:  Negative for photophobia, pain, discharge, redness and visual disturbance.   Respiratory:  Negative for apnea, cough, choking, chest tightness, shortness of breath, wheezing and stridor.    Cardiovascular:  Negative for chest pain, palpitations and leg swelling.   Gastrointestinal:  Negative for abdominal distention, anorexia, diarrhea, rectal pain, vomiting and fecal incontinence.   Endocrine: Negative for cold intolerance, heat intolerance, polydipsia, polyphagia and polyuria.   Genitourinary:  Negative for flank pain, frequency and hot flashes.   Musculoskeletal:  Positive for arthralgias, back pain, myalgias and neck pain.   Integumentary:  Negative for color change and pallor.   Allergic/Immunologic: Negative for immunocompromised state.   Neurological:  Negative for dizziness, vertigo, seizures, syncope, facial asymmetry, speech difficulty, light-headedness, memory loss and coordination difficulties.   Hematological:  Negative for adenopathy. Does not bruise/bleed easily.   Psychiatric/Behavioral:  Negative for agitation, behavioral problems, confusion, decreased concentration, dysphoric mood, hallucinations, self-injury and suicidal ideas. The patient is not nervous/anxious  and is not hyperactive.            Past Medical History:   Diagnosis Date    Acute bronchitis 3/24/2021    Anxiety     Asthma     Chronic otitis media of both ears 2023    Depression     Endometriosis, unspecified     History of migraine 2023    History of multiple sclerosis 2023    Lumbosacral spondylosis without myelopathy 2022    Migraine     Multiple sclerosis     Dr Antonio     Past Surgical History:   Procedure Laterality Date     bilateral cervical paraspinous muscle trigger point injection Bilateral [id number]    D Shows  x 4     SECTION      FOOT SURGERY Left     left toe fusion    HYSTERECTOMY      INJECTION OF ANESTHETIC AGENT AROUND MEDIAL BRANCH NERVES INNERVATING LUMBAR FACET JOINT Bilateral 2022    Procedure: BLOCK, NERVE, FACET JOINT, LUMBAR, MEDIAL BRANCH;  Surgeon: Twyla Gastno MD;  Location: [facility];  Service: Pain Management;  Laterality: Bilateral;    INJECTION OF ANESTHETIC AGENT AROUND MEDIAL BRANCH NERVES INNERVATING LUMBAR FACET JOINT Bilateral 11/10/2022    Procedure: Block-nerve-medial branch-lumbar, bilateral L4 through S1;  Surgeon: Twyla Gaston MD;  Location: [facility];  Service: Pain Management;  Laterality: Bilateral;  patient will have to be tested    KNEE ARTHROSCOPY W/ DEBRIDEMENT Right 2024    Procedure: ARTHROSCOPY, KNEE, WITH DEBRIDEMENT OF MEDIAL FEMORAL CHONDYLE;  Surgeon: Reinaldo Palmer MD;  Location: [facility]TH[facility] OR;  Service: Orthopedics;  Laterality: Right;    KNEE ARTHROSCOPY W/ MENISCECTOMY Right 2024    Procedure: ARTHROSCOPY, KNEE, WITH MEDIAL LATERAL MENISCECTOMY;  Surgeon: Reinaldo Palmer MD;  Location: [facility]THO OR;  Service: Orthopedics;  Laterality: Right;    KNEE CARTILAGE SURGERY Right     LUMBAR PUNCTURE      Dr Trejo    MYRINGOTOMY WITH INSERTION OF VENTILATION TUBE Bilateral 2023    Procedure: MYRINGOTOMY, WITH TYMPANOSTOMY TUBE INSERTION;  Surgeon: Gagan CEDILLO  MD Tito;  Location: [facility] ORTHO OR;  Service: ENT;  Laterality: Bilateral;    pelvic reconstractive surgery      RADIOFREQUENCY ABLATION OF LUMBAR MEDIAL BRANCH NERVE AT SINGLE LEVEL Right 12/15/2022    Procedure: Radiofrequency Ablation, Nerve, Spinal, Lumbar, Medial Branch, Level L4-S1;  Surgeon: Twyla Gaston MD;  Location: [facility];  Service: Pain Management;  Laterality: Right;  cong left after right side is done    RADIOFREQUENCY ABLATION OF LUMBAR MEDIAL BRANCH NERVE AT SINGLE LEVEL Left 01/05/2023    Procedure: RADIOFREQUENCY ABLATION, NERVE, SPINAL, LUMBAR, MEDIAL BRANCH, 1 LEVEL;  Surgeon: Twyla Gaston MD;  Location: [facility];  Service: Pain Management;  Laterality: Left;  Left L4-S1 RFTC. Pt had right on 12/15    Right C3-C7 FI Right 04/17/2018    Dr Trejo    TUBAL LIGATION      VAGINAL DELIVERY       Social History     Socioeconomic History    Marital status: Single   Tobacco Use    Smoking status: Never    Smokeless tobacco: Current   Substance and Sexual Activity    Alcohol use: Never    Drug use: Never    Sexual activity: Yes     Birth control/protection: See Surgical Hx     Social Drivers of Health     Physical Activity: Inactive (8/30/2023)    Exercise Vital Sign     Days of Exercise per Week: 0 days     Minutes of Exercise per Session: 0 min   Stress: No Stress Concern Present (8/30/2023)    [employer] - Occupational Stress Questionnaire     Feeling of Stress : Not at all     Family History   Problem Relation Name Age of Onset    Hypertension Paternal Grandfather      Heart attack Paternal Grandfather      Cancer Maternal Grandfather      Hypertension Father      Asthma Father       Review of patient's allergies indicates:   Allergen Reactions    Cockroach     Grass pollen-bermuda, standard     Grass pollen-veronica, standard     Latex, natural rubber     Peanuts [peanut] Other (See Comments)     Allergy testing    Penicillins     Wheat  "containing prod Other (See Comments)     Allergy testing    Chlorhexidine gluconate Itching and Rash        Objective:  Vitals:    03/25/25 1321 03/25/25 1322   BP: 132/87    Pulse: 107    Resp: 18    Weight: 63.5 kg (140 lb)    Height: 5' 3" (1.6 m)    PainSc:   7   7                   Physical Exam  Vitals and nursing note reviewed. Exam conducted with a chaperone present.   Constitutional:       General: She is awake. She is not in acute distress.     Appearance: Normal appearance. She is not ill-appearing or diaphoretic.   HENT:      Head: Normocephalic and atraumatic.      Nose: Nose normal.      Mouth/Throat:      Mouth: Mucous membranes are moist.      Pharynx: Oropharynx is clear.   Eyes:      Conjunctiva/sclera: Conjunctivae normal.      Pupils: Pupils are equal, round, and reactive to light.   Cardiovascular:      Rate and Rhythm: Normal rate.   Pulmonary:      Effort: Pulmonary effort is normal. No respiratory distress.   Abdominal:      Palpations: Abdomen is soft.      Tenderness: There is no guarding.   Musculoskeletal:         General: Normal range of motion.      Cervical back: Normal range of motion and neck supple. Tenderness present. No rigidity.      Thoracic back: Tenderness present.      Lumbar back: Tenderness present.   Skin:     General: Skin is warm and dry.      Coloration: Skin is not jaundiced or pale.   Neurological:      General: No focal deficit present.      Mental Status: She is alert and oriented to person, place, and time. Mental status is at baseline.      Cranial Nerves: No cranial nerve deficit (II-XII).   Psychiatric:         Mood and Affect: Mood normal.         Behavior: Behavior normal. Behavior is cooperative.         Thought Content: Thought content normal.           MRI Knee Without Contrast Right  Narrative: EXAMINATION:  MRI KNEE WITHOUT CONTRAST RIGHT    CLINICAL HISTORY:  Meniscal tear, previous surgery, new symptoms; Pain in right knee    TECHNIQUE:  Axial sagittal " and coronal imaging of the right knee is performed using T1, proton density, proton density fat-sat, STIR and gradient sequences.    COMPARISON:  None available    FINDINGS:  The anterior and posterior cruciate ligaments are intact without evidence of tear.    The posterior horn medial meniscus has irregular contour and slightly heterogeneous signal there is blunting of the body of the meniscus in the meniscus is slightly extruded from the joint.  There is loss of articular cartilage and small osteophytes in the medial compartment.    Lateral meniscus appears within normal limits.    Small amount of cystic change present in the proximal tibia near the root ligament attachment of the anterior horn lateral meniscus.    The medial and lateral collateral ligament complexes are intact without evidence of abnormality.    There is pitting of articular cartilage and small amount of subchondral changes in the patella.  Otherwise the extensor mechanism is intact and appears within normal limits.    No other abnormal osseous of marrow signal or edema is seen. No focal cartilage defect is present.  Impression: Complex tear and degeneration posterior horn body medial meniscus.  Mild knee osteoarthrosis.    Electronically signed by: Rajesh Olmedo  Date:    05/17/2024  Time:    09:58         Office Visit on 11/11/2024   Component Date Value Ref Range Status    POC Amphetamines 11/11/2024 Negative  Negative, Inconclusive Final    POC Barbiturates 11/11/2024 Negative  Negative, Inconclusive Final    POC Benzodiazepines 11/11/2024 Negative  Negative, Inconclusive Final    POC Cocaine 11/11/2024 Negative  Negative, Inconclusive Final    POC THC 11/11/2024 Negative  Negative, Inconclusive Final    POC Methadone 11/11/2024 Negative  Negative, Inconclusive Final    POC Methamphetamine 11/11/2024 Negative  Negative, Inconclusive Final    POC Opiates 11/11/2024 Presumptive Positive (A)  Negative, Inconclusive Final    POC Oxycodone  11/11/2024 Negative  Negative, Inconclusive Final    POC Phencyclidine 11/11/2024 Negative  Negative, Inconclusive Final    POC Methylenedioxymethamphetamine * 11/11/2024 Negative  Negative, Inconclusive Final    POC Tricyclic Antidepressants 11/11/2024 Negative  Negative, Inconclusive Final    POC Buprenorphine 11/11/2024 Negative   Final     Acceptable 11/11/2024 Yes   Final    POC Temperature (Urine) 11/11/2024 92   Final         No orders of the defined types were placed in this encounter.          Requested Prescriptions     Signed Prescriptions Disp Refills    baclofen (LIORESAL) 10 MG tablet 90 tablet 2     Sig: Take 1 tablet (10 mg total) by mouth 3 (three) times daily.    tiZANidine 4 mg Cap 60 capsule 2     Sig: Take 1 capsule by mouth every 12 (twelve) hours.    HYDROcodone-acetaminophen (NORCO)  mg per tablet 90 tablet 0     Sig: Take 1 tablet by mouth every 8 (eight) hours.    HYDROcodone-acetaminophen (NORCO)  mg per tablet 90 tablet 0     Sig: Take 1 tablet by mouth every 8 (eight) hours.    HYDROcodone-acetaminophen (NORCO)  mg per tablet 90 tablet 0     Sig: Take 1 tablet by mouth every 8 (eight) hours.       Assessment:     1. Multiple sclerosis    2. Cervical radiculopathy    3. Lumbosacral spondylosis without myelopathy                   A's of Opioid Risk Assessment  Activity:Patient can perform ADL.   Analgesia:Patients pain is partially controlled by current medication. Patient has tried OTC medications such as Tylenol and Ibuprofen with out relief.   Adverse Effects: Patient denies constipation or sedation.  Aberrant Behavior:  reviewed with no aberrant drug seeking/taking behavior.  Overdose reversal drug naloxone discussed    Drug screen reviewed        X-ray lumbar spine [facility] May 2021 multiple level degenerative changes no fracture noted    MRI [facility] lumbar spine degenerative changes multiple levels will place report under media    X-ray right  knee [facility] March 12, 2024, mild tricompartmental degenerative changes right knee, no fracture noted        Plan:      Narcan January 2023      Follows Neurology multiple sclerosis [facility]     Follows orthopedics [facility]   Right knee surgery June 13, 2024    She had  lumbar L4 through S1 bilateral , right side January 5, left side December 15, 2022,   she states she would 60% relief after procedure,   she states the procedure did help increase her level of function       Chronic right knee pain neck and back and joint pain she states current medication is helping    She would like to continue conservative management    Continue current medication    Continue home exercise program as directed    Follow-up 3 months     Dr. Gaston, January 2024    Bring original prescription medication bottles/container/box with labels to each visit

## 2025-03-25 ENCOUNTER — OFFICE VISIT (OUTPATIENT)
Dept: PAIN MEDICINE | Facility: CLINIC | Age: 45
End: 2025-03-25
Payer: COMMERCIAL

## 2025-03-25 VITALS
HEART RATE: 107 BPM | WEIGHT: 140 LBS | BODY MASS INDEX: 24.8 KG/M2 | DIASTOLIC BLOOD PRESSURE: 87 MMHG | RESPIRATION RATE: 18 BRPM | HEIGHT: 63 IN | SYSTOLIC BLOOD PRESSURE: 132 MMHG

## 2025-03-25 DIAGNOSIS — M54.12 CERVICAL RADICULOPATHY: Chronic | ICD-10-CM

## 2025-03-25 DIAGNOSIS — M47.817 LUMBOSACRAL SPONDYLOSIS WITHOUT MYELOPATHY: Chronic | ICD-10-CM

## 2025-03-25 DIAGNOSIS — G35 MULTIPLE SCLEROSIS: Primary | Chronic | ICD-10-CM

## 2025-03-25 PROCEDURE — 99214 OFFICE O/P EST MOD 30 MIN: CPT | Mod: PBBFAC | Performed by: PHYSICIAN ASSISTANT

## 2025-03-25 PROCEDURE — 99999 PR PBB SHADOW E&M-EST. PATIENT-LVL IV: CPT | Mod: PBBFAC,,, | Performed by: PHYSICIAN ASSISTANT

## 2025-03-25 PROCEDURE — 99214 OFFICE O/P EST MOD 30 MIN: CPT | Mod: S$PBB,,, | Performed by: PHYSICIAN ASSISTANT

## 2025-03-25 RX ORDER — TIZANIDINE HYDROCHLORIDE 4 MG/1
1 CAPSULE, GELATIN COATED ORAL EVERY 12 HOURS
Qty: 60 CAPSULE | Refills: 2 | Status: SHIPPED | OUTPATIENT
Start: 2025-03-25

## 2025-03-25 RX ORDER — HYDROCODONE BITARTRATE AND ACETAMINOPHEN 10; 325 MG/1; MG/1
1 TABLET ORAL EVERY 8 HOURS
Qty: 90 TABLET | Refills: 0 | Status: SHIPPED | OUTPATIENT
Start: 2025-05-25 | End: 2025-06-24

## 2025-03-25 RX ORDER — BACLOFEN 10 MG/1
10 TABLET ORAL 3 TIMES DAILY
Qty: 90 TABLET | Refills: 2 | Status: SHIPPED | OUTPATIENT
Start: 2025-03-25

## 2025-03-25 RX ORDER — HYDROCODONE BITARTRATE AND ACETAMINOPHEN 10; 325 MG/1; MG/1
1 TABLET ORAL EVERY 8 HOURS
Qty: 90 TABLET | Refills: 0 | Status: SHIPPED | OUTPATIENT
Start: 2025-03-25 | End: 2025-04-24

## 2025-03-25 RX ORDER — HYDROCODONE BITARTRATE AND ACETAMINOPHEN 10; 325 MG/1; MG/1
1 TABLET ORAL EVERY 8 HOURS
Qty: 90 TABLET | Refills: 0 | Status: SHIPPED | OUTPATIENT
Start: 2025-04-25 | End: 2025-05-25

## 2025-04-03 ENCOUNTER — HOSPITAL ENCOUNTER (OUTPATIENT)
Dept: RADIOLOGY | Facility: HOSPITAL | Age: 45
Discharge: HOME | End: 2025-04-03
Payer: COMMERCIAL

## 2025-04-03 ENCOUNTER — OFFICE VISIT (OUTPATIENT)
Dept: ORTHOPEDICS | Facility: CLINIC | Age: 45
End: 2025-04-03
Payer: COMMERCIAL

## 2025-04-03 VITALS — BODY MASS INDEX: 24.8 KG/M2 | WEIGHT: 140 LBS | HEIGHT: 63 IN

## 2025-04-03 DIAGNOSIS — M25.561 RIGHT KNEE PAIN, UNSPECIFIED CHRONICITY: ICD-10-CM

## 2025-04-03 DIAGNOSIS — M25.561 RIGHT KNEE PAIN, UNSPECIFIED CHRONICITY: Primary | ICD-10-CM

## 2025-04-03 DIAGNOSIS — M25.561 ACUTE PAIN OF RIGHT KNEE: Primary | ICD-10-CM

## 2025-04-03 PROCEDURE — 99213 OFFICE O/P EST LOW 20 MIN: CPT | Mod: PBBFAC,25

## 2025-04-03 PROCEDURE — 99999PBSHW PR PBB SHADOW TECHNICAL ONLY FILED TO HB: Mod: PBBFAC,,,

## 2025-04-03 PROCEDURE — 73562 X-RAY EXAM OF KNEE 3: CPT | Mod: 26,RT,, | Performed by: RADIOLOGY

## 2025-04-03 PROCEDURE — 99999 PR PBB SHADOW E&M-EST. PATIENT-LVL III: CPT | Mod: PBBFAC,,,

## 2025-04-03 PROCEDURE — 20610 DRAIN/INJ JOINT/BURSA W/O US: CPT | Mod: PBBFAC,RT

## 2025-04-03 PROCEDURE — 73562 X-RAY EXAM OF KNEE 3: CPT | Mod: TC,RT

## 2025-04-03 RX ADMIN — BUPIVACAINE HYDROCHLORIDE 1 ML: 2.5 INJECTION, SOLUTION EPIDURAL; INFILTRATION; INTRACAUDAL; PERINEURAL at 03:04

## 2025-04-03 RX ADMIN — TRIAMCINOLONE ACETONIDE 40 MG: 40 INJECTION, SUSPENSION INTRA-ARTICULAR; INTRAMUSCULAR at 03:04

## 2025-04-03 NOTE — PROCEDURES
Large Joint Aspiration/Injection: R knee    Date/Time: 4/3/2025 3:00 PM    Performed by: Yaneth Nunez PA  Authorized by: Yaneth Nunez PA    Consent Done?:  Yes (Verbal)  Indications:  Arthritis and pain  Site marked: the procedure site was marked      Details:  Needle Size:  22 G  Approach:  Anterolateral  Location:  Knee  Site:  R knee  Medications:  1 mL BUPivacaine (PF) 0.25% (2.5 mg/ml) 0.25 % (2.5 mg/mL); 40 mg triamcinolone acetonide 40 mg/mL  Patient tolerance:  Patient tolerated the procedure well with no immediate complications

## 2025-04-04 RX ORDER — BUPIVACAINE HYDROCHLORIDE 2.5 MG/ML
1 INJECTION, SOLUTION EPIDURAL; INFILTRATION; INTRACAUDAL; PERINEURAL
Status: DISCONTINUED | OUTPATIENT
Start: 2025-04-03 | End: 2025-04-04 | Stop reason: HOSPADM

## 2025-04-04 RX ORDER — TRIAMCINOLONE ACETONIDE 40 MG/ML
40 INJECTION, SUSPENSION INTRA-ARTICULAR; INTRAMUSCULAR
Status: DISCONTINUED | OUTPATIENT
Start: 2025-04-03 | End: 2025-04-04 | Stop reason: HOSPADM

## 2025-04-04 NOTE — PROGRESS NOTES
CC:   Chief Complaint   Patient presents with    Right Knee - Pain     Jumping on the trampoline. Had a arthroscopy back in 2024 on right knee with Dr. Palmer. Patient stated that when she walks her knee sound like paper tearing.        Pili Love is a 44 y.o. female seen today for follow up of Pain of the Right Knee (Jumping on the trampoline. Had a arthroscopy back in 2024 on right knee with Dr. Palmer. Patient stated that when she walks her knee sound like paper tearing.)  Patient presents today with complaints of acute right knee pain.  Patient was reportedly jumping on the trampoline with her kids a few days ago when she felt her knee give out from under her and reports audible clicking in her knee since that incident.  She has had previous right knee arthroscopy with Dr. Palmer in 2024.  She denies any other fall or injury.  She denies any swelling.  She denies any pain with weight-bearing.  No other complaints today.        PAST MEDICAL HISTORY:   Past Medical History:   Diagnosis Date    Acute bronchitis 3/24/2021    Anxiety     Asthma     Chronic otitis media of both ears 2023    Depression     Endometriosis, unspecified     History of migraine 2023    History of multiple sclerosis 2023    Lumbosacral spondylosis without myelopathy 2022    Migraine     Multiple sclerosis     Dr Antonio        PAST SURGICAL HISTORY:   Past Surgical History:   Procedure Laterality Date     bilateral cervical paraspinous muscle trigger point injection Bilateral [id number]    D Shows  x 4     SECTION      FOOT SURGERY Left     left toe fusion    HYSTERECTOMY      INJECTION OF ANESTHETIC AGENT AROUND MEDIAL BRANCH NERVES INNERVATING LUMBAR FACET JOINT Bilateral 2022    Procedure: BLOCK, NERVE, FACET JOINT, LUMBAR, MEDIAL BRANCH;  Surgeon: Twyla Gaston MD;  Location: [facility];  Service: Pain Management;  Laterality: Bilateral;     INJECTION OF ANESTHETIC AGENT AROUND MEDIAL BRANCH NERVES INNERVATING LUMBAR FACET JOINT Bilateral 11/10/2022    Procedure: Block-nerve-medial branch-lumbar, bilateral L4 through S1;  Surgeon: Twyla Gaston MD;  Location: [facility];  Service: Pain Management;  Laterality: Bilateral;  patient will have to be tested    KNEE ARTHROSCOPY W/ DEBRIDEMENT Right 06/13/2024    Procedure: ARTHROSCOPY, KNEE, WITH DEBRIDEMENT OF MEDIAL FEMORAL CHONDYLE;  Surgeon: Reinaldo Palmer MD;  Location: [facility]THO OR;  Service: Orthopedics;  Laterality: Right;    KNEE ARTHROSCOPY W/ MENISCECTOMY Right 06/13/2024    Procedure: ARTHROSCOPY, KNEE, WITH MEDIAL LATERAL MENISCECTOMY;  Surgeon: Reinaldo Palmer MD;  Location: [facility] OR;  Service: Orthopedics;  Laterality: Right;    KNEE CARTILAGE SURGERY Right     LUMBAR PUNCTURE  2016    Dr Trejo    MYRINGOTOMY WITH INSERTION OF VENTILATION TUBE Bilateral 02/17/2023    Procedure: MYRINGOTOMY, WITH TYMPANOSTOMY TUBE INSERTION;  Surgeon: Gagan Francis MD;  Location: [facility] OR;  Service: ENT;  Laterality: Bilateral;    pelvic reconstractive surgery      RADIOFREQUENCY ABLATION OF LUMBAR MEDIAL BRANCH NERVE AT SINGLE LEVEL Right 12/15/2022    Procedure: Radiofrequency Ablation, Nerve, Spinal, Lumbar, Medial Branch, Level L4-S1;  Surgeon: Twyla Gaston MD;  Location: [facility];  Service: Pain Management;  Laterality: Right;  cong left after right side is done    RADIOFREQUENCY ABLATION OF LUMBAR MEDIAL BRANCH NERVE AT SINGLE LEVEL Left 01/05/2023    Procedure: RADIOFREQUENCY ABLATION, NERVE, SPINAL, LUMBAR, MEDIAL BRANCH, 1 LEVEL;  Surgeon: Twyla Gaston MD;  Location: [facility];  Service: Pain Management;  Laterality: Left;  Left L4-S1 RFTC. Pt had right on 12/15    Right C3-C7 FI Right 04/17/2018    Dr Trejo    TUBAL LIGATION      VAGINAL DELIVERY          ALLERGIES:   Review of patient's allergies indicates:   Allergen Reactions     Cockroach     Grass pollen-bermuda, standard     Grass pollen-veronica, standard     Latex, natural rubber     Peanuts [peanut] Other (See Comments)     Allergy testing    Penicillins     Wheat containing prod Other (See Comments)     Allergy testing    Chlorhexidine gluconate Itching and Rash        MEDICATIONS :  Current Medications[1]     SOCIAL HISTORY: Social History[2]     FAMILY HISTORY:   Family History   Problem Relation Name Age of Onset    Hypertension Paternal Grandfather      Heart attack Paternal Grandfather      Cancer Maternal Grandfather      Hypertension Father      Asthma Father            PHYSICAL EXAM:      There were no vitals filed for this visit.  Body mass index is 24.8 kg/m².    GENERAL: Well-developed, well-nourished female . The patient is alert, oriented and cooperative.    EXTREMITIES:  Right knee nontender to palpation, there is some crepitus with movement, range of motion from 0-120 degrees, knee feels stable to varus and valgus stress testing, neurovascularly intact      RADIOGRAPHIC FINDINGS:   X-Ray Knee 3 View Right  Result Date: 4/3/2025  EXAMINATION: XR KNEE 3 VIEW RIGHT CLINICAL HISTORY: Pain in right knee TECHNIQUE: AP, lateral, and Merchant views of the right knee were performed. COMPARISON: 03/12/2024 FINDINGS: There is no acute fracture or dislocation.  Mild medial compartment narrowing.  Mild degenerative change more so at the medial compartment.  Small to moderate size joint effusion.     As above Electronically signed by: Val Graham MD Date:    04/03/2025 Time:    16:15       Patient Active Problem List    Diagnosis Date Noted    Acute medial meniscus tear of right knee 06/13/2024    Chronic pain of right knee 10/17/2023    Depressive disorder 08/30/2023    Chronic pain disorder 08/30/2023    Asthma 08/30/2023    Anxiety 08/30/2023    Trochanteric bursitis of right hip 06/19/2023    Polyarthralgia 01/30/2023    Neck pain 08/17/2021    Myalgia 08/17/2021    Multiple  sclerosis 05/24/2021    Cervical radiculopathy 05/24/2021    Migraine     Lumbosacral radiculopathy     Chronic migraine without aura with status migrainosus, not intractable 06/21/2017       IMPRESSION AND PLAN:  History of right knee arthroscopy.  Acute right knee pain.  Knee feels stable on exam today.  There is some crepitus.  Personally reviewed x-rays of right knee today showing small effusion, mild degenerative changes, no acute fracture or dislocation.  Discussed all treatment options with the patient today.  Recommend rest ice and elevation.  Also discussed possible steroid injection, see procedural note for details.  Follow up with Dr. Palmer if pain worsens or continues, otherwise p.r.n..      No follow-ups on file.       Yaneth Nunez PA-C      (Subject to voice recognition error, transcription service not allowed)         [1]   Current Outpatient Medications:     albuterol (VENTOLIN HFA) 90 mcg/actuation inhaler, Inhale 2 puffs into the lungs every 6 (six) hours as needed for Wheezing. Rescue, Disp: 18 g, Rfl: 0    ARIPiprazole (ABILIFY) 20 MG Tab, Take by mouth., Disp: , Rfl:     baclofen (LIORESAL) 10 MG tablet, Take 1 tablet (10 mg total) by mouth 3 (three) times daily., Disp: 90 tablet, Rfl: 2    buPROPion (WELLBUTRIN SR) 200 MG SR12, , Disp: , Rfl:     diclofenac sodium (VOLTAREN ARTHRITIS PAIN) 1 % Gel, Apply 2 g topically 2 (two) times daily. Apply 2 grams BID to knees, elbows, hips joints bilaterally as needed, Disp: 10 each, Rfl: 2    EPINEPHrine (EPIPEN) 0.3 mg/0.3 mL AtIn, Inject 0.3 mLs (0.3 mg total) into the muscle once. for 1 dose, Disp: 0.3 mL, Rfl: 0    gabapentin (NEURONTIN) 300 MG capsule, Take 2 capsules (600 mg total) by mouth 3 (three) times daily., Disp: 270 capsule, Rfl: 3    HYDROcodone-acetaminophen (NORCO)  mg per tablet, Take 1 tablet by mouth every 8 (eight) hours., Disp: 90 tablet, Rfl: 0    [START ON 4/25/2025] HYDROcodone-acetaminophen (NORCO)  mg per  tablet, Take 1 tablet by mouth every 8 (eight) hours., Disp: 90 tablet, Rfl: 0    [START ON 5/25/2025] HYDROcodone-acetaminophen (NORCO)  mg per tablet, Take 1 tablet by mouth every 8 (eight) hours., Disp: 90 tablet, Rfl: 0    MAXALT-MLT 10 mg disintegrating tablet, Place under the tongue., Disp: , Rfl:     nortriptyline (PAMELOR) 75 MG Cap, Take 1 capsule (75 mg total) by mouth every evening., Disp: 90 capsule, Rfl: 3    oxybutynin (DITROPAN-XL) 5 MG TR24, , Disp: , Rfl:     tiZANidine 4 mg Cap, Take 1 capsule by mouth every 12 (twelve) hours., Disp: 60 capsule, Rfl: 2  [2]   Social History  Socioeconomic History    Marital status: Single   Tobacco Use    Smoking status: Never    Smokeless tobacco: Current   Substance and Sexual Activity    Alcohol use: Never    Drug use: Never    Sexual activity: Yes     Birth control/protection: See Surgical Hx     Social Drivers of Health     Physical Activity: Inactive (8/30/2023)    Exercise Vital Sign     Days of Exercise per Week: 0 days     Minutes of Exercise per Session: 0 min   Stress: No Stress Concern Present (8/30/2023)    [employer] - Occupational Stress Questionnaire     Feeling of Stress : Not at all

## 2025-08-25 ENCOUNTER — OFFICE VISIT (OUTPATIENT)
Dept: PAIN MEDICINE | Facility: CLINIC | Age: 45
End: 2025-08-25
Payer: COMMERCIAL

## 2025-08-25 VITALS
OXYGEN SATURATION: 98 % | WEIGHT: 151 LBS | DIASTOLIC BLOOD PRESSURE: 91 MMHG | HEART RATE: 116 BPM | BODY MASS INDEX: 26.75 KG/M2 | RESPIRATION RATE: 18 BRPM | SYSTOLIC BLOOD PRESSURE: 132 MMHG | HEIGHT: 63 IN

## 2025-08-25 DIAGNOSIS — G35 MULTIPLE SCLEROSIS: Primary | Chronic | ICD-10-CM

## 2025-08-25 DIAGNOSIS — M54.12 CERVICAL RADICULOPATHY: Chronic | ICD-10-CM

## 2025-08-25 DIAGNOSIS — M47.817 LUMBOSACRAL SPONDYLOSIS WITHOUT MYELOPATHY: Chronic | ICD-10-CM

## 2025-08-25 DIAGNOSIS — Z79.899 ENCOUNTER FOR LONG-TERM (CURRENT) USE OF MEDICATIONS: ICD-10-CM

## 2025-08-25 PROCEDURE — 99999 PR PBB SHADOW E&M-EST. PATIENT-LVL IV: CPT | Mod: PBBFAC,,, | Performed by: PHYSICIAN ASSISTANT

## 2025-08-25 PROCEDURE — 99214 OFFICE O/P EST MOD 30 MIN: CPT | Mod: S$PBB,,, | Performed by: PHYSICIAN ASSISTANT

## 2025-08-25 PROCEDURE — 99214 OFFICE O/P EST MOD 30 MIN: CPT | Mod: PBBFAC | Performed by: PHYSICIAN ASSISTANT

## 2025-08-25 PROCEDURE — 80305 DRUG TEST PRSMV DIR OPT OBS: CPT | Mod: PBBFAC | Performed by: PHYSICIAN ASSISTANT

## 2025-08-25 PROCEDURE — 99999PBSHW POCT URINE DRUG SCREEN PRESUMP: Mod: PBBFAC,,,

## 2025-08-25 RX ORDER — HYDROCODONE BITARTRATE 20 MG/1
20 TABLET, EXTENDED RELEASE ORAL DAILY
Qty: 30 EACH | Refills: 0 | Status: SHIPPED | OUTPATIENT
Start: 2025-09-24

## 2025-08-25 RX ORDER — HYDROCODONE BITARTRATE 20 MG/1
20 TABLET, EXTENDED RELEASE ORAL DAILY
Qty: 30 EACH | Refills: 0 | Status: SHIPPED | OUTPATIENT
Start: 2025-08-25

## 2025-08-25 RX ORDER — GABAPENTIN 300 MG/1
600 CAPSULE ORAL 3 TIMES DAILY
Qty: 270 CAPSULE | Refills: 0 | Status: SHIPPED | OUTPATIENT
Start: 2025-08-25

## 2025-08-25 RX ORDER — HYDROCODONE BITARTRATE 20 MG/1
20 TABLET, EXTENDED RELEASE ORAL DAILY
Qty: 30 EACH | Refills: 0 | Status: SHIPPED | OUTPATIENT
Start: 2025-10-24

## 2025-08-25 RX ORDER — BACLOFEN 10 MG/1
10 TABLET ORAL 3 TIMES DAILY
Qty: 90 TABLET | Refills: 2 | Status: SHIPPED | OUTPATIENT
Start: 2025-08-25

## 2025-08-25 RX ORDER — TIZANIDINE HYDROCHLORIDE 4 MG/1
1 CAPSULE, GELATIN COATED ORAL EVERY 12 HOURS
Qty: 60 CAPSULE | Refills: 2 | Status: SHIPPED | OUTPATIENT
Start: 2025-08-25

## 2025-08-25 RX ORDER — HYDROCODONE BITARTRATE AND ACETAMINOPHEN 10; 325 MG/1; MG/1
1 TABLET ORAL
Qty: 30 TABLET | Refills: 0 | Status: SHIPPED | OUTPATIENT
Start: 2025-08-25 | End: 2025-09-24

## 2025-08-25 RX ORDER — HYDROCODONE BITARTRATE AND ACETAMINOPHEN 10; 325 MG/1; MG/1
1 TABLET ORAL
Qty: 30 TABLET | Refills: 0 | Status: SHIPPED | OUTPATIENT
Start: 2025-09-24 | End: 2025-10-24

## 2025-08-25 RX ORDER — HYDROCODONE BITARTRATE AND ACETAMINOPHEN 10; 325 MG/1; MG/1
1 TABLET ORAL
Qty: 30 TABLET | Refills: 0 | Status: SHIPPED | OUTPATIENT
Start: 2025-10-24 | End: 2025-11-23

## (undated) DEVICE — KIT IV START RUSH

## (undated) DEVICE — KIT COOLED RF 100MM SGL PROBE

## (undated) DEVICE — SOL NACL IRR 3000ML

## (undated) DEVICE — GLOVE SENSICARE PI GRN 8

## (undated) DEVICE — GLOVE SENSICARE PI GRN 6

## (undated) DEVICE — NDL SPINAL 22GA 3.5 IN QUINCKE

## (undated) DEVICE — BANDAGE SURE-WRAP 6INX5YD

## (undated) DEVICE — DRAPE THREE-QUARTER 53X77IN

## (undated) DEVICE — GLOVE SENSICARE PI SURG 6

## (undated) DEVICE — TRAY SKIN SCRUB WET PREMIUM

## (undated) DEVICE — GLOVE SENSICARE PI GRN 6.5

## (undated) DEVICE — Device

## (undated) DEVICE — CATH IV 20G 1.16 IN AUTOGARD

## (undated) DEVICE — KIT IV START OCHSNER CUSTOM

## (undated) DEVICE — GOWN POLY REINF BRTH SLV XL

## (undated) DEVICE — CATH IV 22G X 1 AUTOGUARD

## (undated) DEVICE — NDL SPINAL CLR HUB 22GX4 3/4

## (undated) DEVICE — GLOVE PROTEXIS PI SYN SURG 6.5

## (undated) DEVICE — PAD ELECTROSURGICAL SPL W/CORD

## (undated) DEVICE — SHAVER TOMCAT 4.0

## (undated) DEVICE — SOL CONTINU-FLO SET 2 LAV

## (undated) DEVICE — APPLICATOR CHLORAPREP ORN 26ML

## (undated) DEVICE — TUBE SUCTION MEDI-VAC STERILE

## (undated) DEVICE — TRAY NERVE BLOCK UNIV 10/CA

## (undated) DEVICE — GLOVE SENSICARE PI GRN 7.5

## (undated) DEVICE — COTTONBALL LARGE STRL

## (undated) DEVICE — BLADE SPEAR TIP BEAVER 45DEG

## (undated) DEVICE — PAD ABDOMINAL 8X7.5 STERILE

## (undated) DEVICE — PROBE AARDVARK SUC 0.5X135MM

## (undated) DEVICE — DEVICE SUCTION H20 BROOM 12FT

## (undated) DEVICE — TUBING CROSSFLOW INFLOW CASS

## (undated) DEVICE — GLOVE PROTEXIS PI SYN SURG 7.5

## (undated) DEVICE — CANISTER SUCTION MEDI-VAC 12L

## (undated) DEVICE — GLOVE SENSICARE PI SURG 7.5

## (undated) DEVICE — PACK KNEE ARTHROSCOPY  RUSH

## (undated) DEVICE — TOURNIQUET SB QC SP 34X4IN